# Patient Record
Sex: FEMALE | Race: WHITE | Employment: OTHER | URBAN - NONMETROPOLITAN AREA
[De-identification: names, ages, dates, MRNs, and addresses within clinical notes are randomized per-mention and may not be internally consistent; named-entity substitution may affect disease eponyms.]

---

## 2018-04-29 ENCOUNTER — HOSPITAL ENCOUNTER (OUTPATIENT)
Dept: LAB | Age: 83
Discharge: HOME | End: 2018-04-29
Attending: CLINIC/CENTER | Admitting: CLINIC/CENTER

## 2018-04-29 DIAGNOSIS — J20.9 ACUTE BRONCHITIS, UNSPECIFIED ORGANISM: ICD-10-CM

## 2018-04-29 DIAGNOSIS — R06.02 BREATH SHORTNESS: ICD-10-CM

## 2018-04-29 LAB
ALBUMIN SERPL-MCNC: 3.8 GM/DL (ref 3.4–5)
ALP BLD-CCNC: 98 IU/L (ref 40–129)
ALT SERPL-CCNC: 11 U/L (ref 10–40)
ANION GAP SERPL CALCULATED.3IONS-SCNC: 11 MMOL/L (ref 4–16)
AST SERPL-CCNC: 23 IU/L (ref 15–37)
BASOPHILS ABSOLUTE: 0.1 K/CU MM
BASOPHILS RELATIVE PERCENT: 0.7 % (ref 0–1)
BILIRUB SERPL-MCNC: 0.4 MG/DL (ref 0–1)
BUN BLDV-MCNC: 12 MG/DL (ref 6–23)
CALCIUM SERPL-MCNC: 8.9 MG/DL (ref 8.3–10.6)
CHLORIDE BLD-SCNC: 98 MMOL/L (ref 99–110)
CO2: 27 MMOL/L (ref 21–32)
CREAT SERPL-MCNC: 1.3 MG/DL (ref 0.6–1.1)
DIFFERENTIAL TYPE: ABNORMAL
EOSINOPHILS ABSOLUTE: 0.3 K/CU MM
EOSINOPHILS RELATIVE PERCENT: 3.3 % (ref 0–3)
GFR AFRICAN AMERICAN: 47 ML/MIN/1.73M2
GFR NON-AFRICAN AMERICAN: 39 ML/MIN/1.73M2
GLUCOSE BLD-MCNC: 153 MG/DL (ref 70–99)
HCT VFR BLD CALC: 45.5 % (ref 37–47)
HEMOGLOBIN: 14.8 GM/DL (ref 12.5–16)
IMMATURE NEUTROPHIL %: 0.5 % (ref 0–0.43)
LYMPHOCYTES ABSOLUTE: 2.3 K/CU MM
LYMPHOCYTES RELATIVE PERCENT: 28.4 % (ref 24–44)
MCH RBC QN AUTO: 30 PG (ref 27–31)
MCHC RBC AUTO-ENTMCNC: 32.5 % (ref 32–36)
MCV RBC AUTO: 92.1 FL (ref 78–100)
MONOCYTES ABSOLUTE: 0.8 K/CU MM
MONOCYTES RELATIVE PERCENT: 9.4 % (ref 0–4)
PDW BLD-RTO: 13.2 % (ref 11.7–14.9)
PLATELET # BLD: 211 K/CU MM (ref 140–440)
PMV BLD AUTO: 9.1 FL (ref 7.5–11.1)
POTASSIUM SERPL-SCNC: 4 MMOL/L (ref 3.5–5.1)
RBC # BLD: 4.94 M/CU MM (ref 4.2–5.4)
SEGMENTED NEUTROPHILS ABSOLUTE COUNT: 4.7 K/CU MM
SEGMENTED NEUTROPHILS RELATIVE PERCENT: 57.7 % (ref 36–66)
SODIUM BLD-SCNC: 136 MMOL/L (ref 135–145)
TOTAL IMMATURE NEUTOROPHIL: 0.04 K/CU MM
TOTAL PROTEIN: 7.1 GM/DL (ref 6.4–8.2)
WBC # BLD: 8.1 K/CU MM (ref 4–10.5)

## 2022-01-12 ENCOUNTER — HOSPITAL ENCOUNTER (EMERGENCY)
Age: 87
Discharge: HOME | End: 2022-01-12
Attending: EMERGENCY MEDICINE
Payer: MEDICARE

## 2022-01-12 ENCOUNTER — APPOINTMENT (OUTPATIENT)
Dept: CT IMAGING | Age: 87
End: 2022-01-12
Payer: MEDICARE

## 2022-01-12 ENCOUNTER — APPOINTMENT (OUTPATIENT)
Dept: GENERAL RADIOLOGY | Age: 87
End: 2022-01-12
Payer: MEDICARE

## 2022-01-12 VITALS
OXYGEN SATURATION: 100 % | DIASTOLIC BLOOD PRESSURE: 90 MMHG | TEMPERATURE: 99.7 F | RESPIRATION RATE: 22 BRPM | BODY MASS INDEX: 21.81 KG/M2 | HEART RATE: 92 BPM | SYSTOLIC BLOOD PRESSURE: 164 MMHG | WEIGHT: 108 LBS

## 2022-01-12 DIAGNOSIS — N83.8 OVARIAN MASS, RIGHT: ICD-10-CM

## 2022-01-12 DIAGNOSIS — R41.82 ALTERED MENTAL STATUS, UNSPECIFIED ALTERED MENTAL STATUS TYPE: Primary | ICD-10-CM

## 2022-01-12 LAB
ALBUMIN SERPL-MCNC: 3.9 GM/DL (ref 3.4–5)
ALP BLD-CCNC: 75 IU/L (ref 40–129)
ALT SERPL-CCNC: 10 U/L (ref 10–40)
ANION GAP SERPL CALCULATED.3IONS-SCNC: 12 MMOL/L (ref 4–16)
AST SERPL-CCNC: 29 IU/L (ref 15–37)
BACTERIA: ABNORMAL /HPF
BASOPHILS ABSOLUTE: 0 K/CU MM
BASOPHILS RELATIVE PERCENT: 0.4 % (ref 0–1)
BILIRUB SERPL-MCNC: 0.5 MG/DL (ref 0–1)
BILIRUBIN DIRECT: 0.2 MG/DL (ref 0–0.3)
BILIRUBIN URINE: NEGATIVE MG/DL
BILIRUBIN, INDIRECT: 0.3 MG/DL (ref 0–0.7)
BLOOD, URINE: ABNORMAL
BUN BLDV-MCNC: 19 MG/DL (ref 6–23)
CALCIUM SERPL-MCNC: 8.8 MG/DL (ref 8.3–10.6)
CAST TYPE: ABNORMAL /HPF
CHLORIDE BLD-SCNC: 105 MMOL/L (ref 99–110)
CLARITY: CLEAR
CO2: 22 MMOL/L (ref 21–32)
COLOR: YELLOW
CREAT SERPL-MCNC: 1.1 MG/DL (ref 0.6–1.1)
CRYSTAL TYPE: ABNORMAL /HPF
DIFFERENTIAL TYPE: ABNORMAL
EOSINOPHILS ABSOLUTE: 0 K/CU MM
EOSINOPHILS RELATIVE PERCENT: 0.1 % (ref 0–3)
EPITHELIAL CELLS, UA: ABNORMAL /HPF
GFR AFRICAN AMERICAN: 56 ML/MIN/1.73M2
GFR NON-AFRICAN AMERICAN: 47 ML/MIN/1.73M2
GLUCOSE BLD-MCNC: 146 MG/DL (ref 70–99)
GLUCOSE BLD-MCNC: 148 MG/DL (ref 70–99)
GLUCOSE, URINE: NEGATIVE MG/DL
HCT VFR BLD CALC: 44 % (ref 37–47)
HEMOGLOBIN: 14.7 GM/DL (ref 12.5–16)
IMMATURE NEUTROPHIL %: 0.4 % (ref 0–0.43)
KETONES, URINE: ABNORMAL MG/DL
LEUKOCYTE ESTERASE, URINE: NEGATIVE
LIPASE: 24 IU/L (ref 13–60)
LYMPHOCYTES ABSOLUTE: 0.6 K/CU MM
LYMPHOCYTES RELATIVE PERCENT: 8.6 % (ref 24–44)
MCH RBC QN AUTO: 29.6 PG (ref 27–31)
MCHC RBC AUTO-ENTMCNC: 33.4 % (ref 32–36)
MCV RBC AUTO: 88.7 FL (ref 78–100)
MONOCYTES ABSOLUTE: 0.6 K/CU MM
MONOCYTES RELATIVE PERCENT: 8.1 % (ref 0–4)
MUCUS: ABNORMAL HPF
NITRITE URINE, QUANTITATIVE: NEGATIVE
PDW BLD-RTO: 14.3 % (ref 11.7–14.9)
PH, URINE: 5 (ref 5–8)
PLATELET # BLD: 144 K/CU MM (ref 140–440)
PMV BLD AUTO: 9.9 FL (ref 7.5–11.1)
POTASSIUM SERPL-SCNC: 4.3 MMOL/L (ref 3.5–5.1)
PRO-BNP: 1743 PG/ML
PROTEIN UA: NEGATIVE MG/DL
RBC # BLD: 4.96 M/CU MM (ref 4.2–5.4)
RBC URINE: ABNORMAL /HPF (ref 0–6)
SEGMENTED NEUTROPHILS ABSOLUTE COUNT: 5.6 K/CU MM
SEGMENTED NEUTROPHILS RELATIVE PERCENT: 82.4 % (ref 36–66)
SODIUM BLD-SCNC: 139 MMOL/L (ref 135–145)
SPECIFIC GRAVITY UA: 1.02 (ref 1–1.03)
TOTAL IMMATURE NEUTOROPHIL: 0.03 K/CU MM
TOTAL PROTEIN: 6.8 GM/DL (ref 6.4–8.2)
TROPONIN T: <0.01 NG/ML
UROBILINOGEN, URINE: 0.2 MG/DL (ref 0.2–1)
VOLUME, (UVOL): 12 ML (ref 10–12)
WBC # BLD: 6.8 K/CU MM (ref 4–10.5)
WBC UA: ABNORMAL /HPF (ref 0–5)

## 2022-01-12 PROCEDURE — 74177 CT ABD & PELVIS W/CONTRAST: CPT

## 2022-01-12 PROCEDURE — 81001 URINALYSIS AUTO W/SCOPE: CPT

## 2022-01-12 PROCEDURE — 85025 COMPLETE CBC W/AUTO DIFF WBC: CPT

## 2022-01-12 PROCEDURE — 83690 ASSAY OF LIPASE: CPT

## 2022-01-12 PROCEDURE — 99283 EMERGENCY DEPT VISIT LOW MDM: CPT

## 2022-01-12 PROCEDURE — 83880 ASSAY OF NATRIURETIC PEPTIDE: CPT

## 2022-01-12 PROCEDURE — 71045 X-RAY EXAM CHEST 1 VIEW: CPT

## 2022-01-12 PROCEDURE — 84484 ASSAY OF TROPONIN QUANT: CPT

## 2022-01-12 PROCEDURE — 80053 COMPREHEN METABOLIC PANEL: CPT

## 2022-01-12 PROCEDURE — 82248 BILIRUBIN DIRECT: CPT

## 2022-01-12 PROCEDURE — 93005 ELECTROCARDIOGRAM TRACING: CPT | Performed by: EMERGENCY MEDICINE

## 2022-01-12 PROCEDURE — 6360000004 HC RX CONTRAST MEDICATION: Performed by: EMERGENCY MEDICINE

## 2022-01-12 PROCEDURE — 70450 CT HEAD/BRAIN W/O DYE: CPT

## 2022-01-12 PROCEDURE — 82962 GLUCOSE BLOOD TEST: CPT

## 2022-01-12 RX ADMIN — IOPAMIDOL 100 ML: 755 INJECTION, SOLUTION INTRAVENOUS at 19:55

## 2022-01-12 NOTE — ED NOTES
Sai pt's niece states \" she got her booster yesterday and she has become more weak since then. She does have confusion from time to time. She was weaker this morning than she normally is\".       Rebeka Cagle RN  01/12/22 6615

## 2022-01-12 NOTE — ED PROVIDER NOTES
CHIEF COMPLAINT  Chief Complaint   Patient presents with    Altered Mental Status     pt family states confusion and weakness that started this morning. Pt got covid booster yesterday. HPI  Shi Bradshaw is a 80 y.o. female with history of arthritis, thyroid disease who presents confusion and altered mental status that started this morning. She received her COVID booster yesterday. Family reports that she seems more sleepy, less likely to respond and generalizability slower than usual.  Patient denies any pain and states \"I do not know why I am here\". She does have some discomfort on abdominal palpation. She denies any focal weakness or numbness or falls. She denies any cough, shortness of breath, fevers or vomiting. REVIEW OF SYSTEMS  Review of Systems   History obtained from chart review and the patient  General ROS: negative for - chills or fever  Ophthalmic ROS: negative for - decreased vision or double vision  ENT ROS: negative for - headaches  Hematological and Lymphatic ROS: negative for - bleeding problems  Endocrine ROS: negative for - unexpected weight changes  Respiratory ROS: no cough, shortness of breath, or wheezing  Cardiovascular ROS: no chest pain or dyspnea on exertion  Gastrointestinal ROS: no change in bowel habits, or black or bloody stools  Genito-Urinary ROS: no dysuria, trouble voiding, or hematuria  Musculoskeletal ROS: negative for - joint stiffness or joint swelling  Neurological ROS: no TIA or stroke symptoms      PAST MEDICAL HISTORY  Past Medical History:   Diagnosis Date    Arthritis     Thyroid disease        FAMILY HISTORY  No family history on file. SOCIAL HISTORY  Social History     Socioeconomic History    Marital status:       Spouse name: Not on file    Number of children: Not on file    Years of education: Not on file    Highest education level: Not on file   Occupational History    Not on file   Tobacco Use    Smoking status: Never Smoker  Smokeless tobacco: Never Used   Substance and Sexual Activity    Alcohol use: No    Drug use: No    Sexual activity: Never   Other Topics Concern    Not on file   Social History Narrative    Not on file     Social Determinants of Health     Financial Resource Strain:     Difficulty of Paying Living Expenses: Not on file   Food Insecurity:     Worried About Running Out of Food in the Last Year: Not on file    Meredith of Food in the Last Year: Not on file   Transportation Needs:     Lack of Transportation (Medical): Not on file    Lack of Transportation (Non-Medical): Not on file   Physical Activity:     Days of Exercise per Week: Not on file    Minutes of Exercise per Session: Not on file   Stress:     Feeling of Stress : Not on file   Social Connections:     Frequency of Communication with Friends and Family: Not on file    Frequency of Social Gatherings with Friends and Family: Not on file    Attends Sikhism Services: Not on file    Active Member of [address] [place] [address] or Organizations: Not on file    Attends Club or Organization Meetings: Not on file    Marital Status: Not on file   Intimate Partner Violence:     Fear of Current or Ex-Partner: Not on file    Emotionally Abused: Not on file    Physically Abused: Not on file    Sexually Abused: Not on file   Housing Stability:     Unable to Pay for Housing in the Last Year: Not on file    Number of [place] in the Last Year: Not on file    Unstable Housing in the Last Year: Not on file       SURGICAL HISTORY  Past Surgical History:   Procedure Laterality Date    APPENDECTOMY      CHOLECYSTECTOMY      [facility] [place]  No current facility-administered medications on file prior to encounter.      Current Outpatient Medications on File Prior to Encounter   Medication Sig Dispense Refill    levothyroxine (SYNTHROID) 125 MCG tablet Take 125 mcg by mouth Daily  omeprazole (PRILOSEC) 20 MG capsule Take 20 mg by mouth daily           ALLERGIES  No Known Allergies    PHYSICAL EXAM  VITAL SIGNS: BP (!) 182/101   Pulse 105   Temp 99.7 °F (37.6 °C) (Oral)   Resp 22   Wt 108 lb (49 kg)   SpO2 95%   BMI 21.81 kg/m²   Constitutional: Well developed, Well nourished, resting in bed, elderly  HENT: Normocephalic, Atraumatic, Bilateral external ears normal, Oropharynx moist, No oral exudates, Nose normal.   Eyes: PERRL, EOMI, Conjunctiva normal, No discharge. Neck: Normal range of motion, Supple, No stridor. Cardiovascular: Normal heart rate, Normal rhythm, No murmurs, No rubs, No gallops. Thorax & Lungs: Normal breath sounds, No respiratory distress, No wheezing, No chest tenderness. Abdomen: Abdominal distention, mild diffuse tenderness, remittent diffuse guarding   skin: Warm, Dry, No erythema, No rash. Extremities: Intact distal pulses, No edema, No tenderness, No cyanosis, No clubbing. Musculoskeletal: Good gross range of motion in all major joints. No major deformities noted. Neurologic: Alert & oriented x 3, Normal gross motor function, Normal gross sensory function, No focal deficits noted.    Psychiatric: Affect normal    EKG  EKG Interpretation    Interpreted by emergency department physician from January 12 at [facility]    Rhythm: normal sinus   Rate: normal  Axis: left  Ectopy: none  Conduction: nonspecific interventricular conduction block  ST Segments: nonspecific changes  T Waves: no acute change  Q Waves: none    Clinical Impression: Sinus rhythm with a rate of 100, sinus arrhythmia, left bundle branch block with nonspecific ST changes    Villa Peabody, MD      RADIOLOGY/PROCEDURES/LABS  Last Imaging results   XR CHEST PORTABLE    (Results Pending)   CT ABDOMEN PELVIS W IV CONTRAST Additional Contrast? None    (Results Pending)   CT HEAD WO CONTRAST    (Results Pending)       Imaging pending at time of signout    Labs Reviewed   CBC WITH AUTO DIFFERENTIAL - Abnormal; Notable for the following components:       Result Value    Segs Relative 82.4 (*)     Lymphocytes % 8.6 (*)     Monocytes % 8.1 (*)     All other components within normal limits   BASIC METABOLIC PANEL W/ REFLEX TO MG FOR LOW K - Abnormal; Notable for the following components:    Glucose 148 (*)     GFR Non- 47 (*)     GFR  56 (*)     All other components within normal limits   BRAIN NATRIURETIC PEPTIDE - Abnormal; Notable for the following components:    Pro-BNP 1,743 (*)     All other components within normal limits   POCT GLUCOSE - Abnormal; Notable for the following components:    POC Glucose 146 (*)     All other components within normal limits   HEPATIC FUNCTION PANEL   LIPASE   TROPONIN   URINALYSIS         Medications - No data to display    COURSE & MEDICAL DECISION MAKING  Pertinent Labs & Imaging studies reviewed. (See chart for details)    59-year-old female presents with altered mental status after COVID booster. She is awaiting imaging, laboratory studies at time of signout. Based on her abdominal distention we did obtain a CT scan of her abdomen, CT of her head also pending. Signed out to oncoming physician to facilitate disposition. Patient neurologically nonfocal, not suggestive of CVA, otherwise active and able to answer questions but does appear hard of hearing. FINAL IMPRESSION  Problem List Items Addressed This Visit     None      Visit Diagnoses     Altered mental status, unspecified altered mental status type    -  Primary      1.    2.   3.    Patient gave me permission to discuss medical history, care, and plan with those present in the room.   Electronically signed by: Sandi Her MD, 1/12/2022  MD Sandi Orellana MD  01/12/22 [address] [facility]

## 2022-01-13 LAB
EKG ATRIAL RATE: 100 BPM
EKG DIAGNOSIS: NORMAL
EKG P AXIS: 69 DEGREES
EKG P-R INTERVAL: 232 MS
EKG Q-T INTERVAL: 338 MS
EKG QRS DURATION: 102 MS
EKG QTC CALCULATION (BAZETT): 436 MS
EKG R AXIS: -53 DEGREES
EKG T AXIS: 70 DEGREES
EKG VENTRICULAR RATE: 100 BPM

## 2022-01-13 PROCEDURE — 93010 ELECTROCARDIOGRAM REPORT: CPT | Performed by: INTERNAL MEDICINE

## 2022-01-13 NOTE — ED PROVIDER NOTES
ADDENDUM:    Care of the patient was assumed  from Dr. Maria De Jesus Donaldson. I have reviewed the notes, assessments, and/or procedures performed, I concur with her/his documentation on Ines Martinez. I reviewed the medical record and evaluated the patient. ED COURSE/MDM:  Laboratory and imaging data were reviewed and care plan was arranged with the patient(see separate lab/imaging reports). RADIOLOGY:  Already resulted studies have been reviewed. CT HEAD WO CONTRAST   Preliminary Result   No acute intracranial abnormality. Cerebral atrophy. Severe chronic small vessel ischemic changes. CT ABDOMEN PELVIS W IV CONTRAST Additional Contrast? 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 No ascites. XR CHEST PORTABLE   Preliminary Result   Fullness of the hilar regions bilaterally. Underlying lymphadenopathy cannot   be entirely excluded. PA and lateral views of the chest or CT of the chest   is suggested to further evaluate.              Labs Reviewed   CBC WITH AUTO DIFFERENTIAL - Abnormal; Notable for the following components:       Result Value    Segs Relative 82.4 (*)     Lymphocytes % 8.6 (*)     Monocytes % 8.1 (*)     All other components within normal limits   BASIC METABOLIC PANEL W/ REFLEX TO MG FOR LOW K - Abnormal; Notable for the following components:    Glucose 148 (*)     GFR Non- 47 (*)     GFR  56 (*)     All other components within normal limits   BRAIN NATRIURETIC PEPTIDE - Abnormal; Notable for the following components:    Pro-BNP 1,743 (*)     All other components within normal limits   URINALYSIS - Abnormal; Notable for the following components:    Clarity, UA CLEAR (*)     Ketones, Urine TRACE (*)     Blood, Urine 1+ (*)     Bacteria, UA NONE (*)     Mucus, UA 2+ (*)     All other components within normal limits   POCT GLUCOSE - Abnormal; Notable for the following components:    POC Glucose 146 (*)     All other components within normal limits   HEPATIC FUNCTION PANEL   LIPASE   TROPONIN       Medications   iopamidol (ISOVUE-370) 76 % injection 100 mL (100 mLs IntraVENous Given 1/12/22 1955)       Vitals:    01/12/22 1801 01/12/22 1815 01/12/22 2100 01/12/22 2115   BP:  (!) 182/101  (!) 164/90   Pulse: 105   92   Resp: 22      Temp: 99.7 °F (37.6 °C)      TempSrc: Oral      SpO2: 95%  100%    Weight: 108 lb (49 kg)          CT HEAD WO CONTRAST   Preliminary Result   No acute intracranial abnormality. Cerebral atrophy. Severe chronic small vessel ischemic changes. CT ABDOMEN PELVIS W IV CONTRAST Additional Contrast? 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 No ascites. XR CHEST PORTABLE   Preliminary Result   Fullness of the hilar regions bilaterally. Underlying lymphadenopathy cannot   be entirely excluded. PA and lateral views of the chest or CT of the chest   is suggested to further evaluate. Patient has an Ovarian mass with cystic quality. This was originally seen in 2015 when it was 2cm. There was no follow up and no referral that I can find and patient did not know about this mass. I have referred the patient to Dr. Pavel Temple who is on call for gynecology. She will be following up with the patient inn her office. I would like to thank Dr. Pavel Temple for being so helpful in my disposition of this patient. My typical dicussion, presentation, and considerations for this patients' chief complaint, diagnosis, differential diagnosis, medications, medication use,  medication safety and medication interactions have been explained and outlined to this patient for this patient encounter. I have stressed need for follow up and reexamination for this encounter     FINAL IMPRESSION:  1. Altered mental status, unspecified altered mental status type    2.  Ovarian mass, right        Discharge Medication List as of 1/12/2022  9:21 PM                    Liberty Schreiber Issa Cano DO  01/12/22 2228

## 2022-03-01 ENCOUNTER — APPOINTMENT (OUTPATIENT)
Dept: MRI IMAGING | Age: 87
DRG: 194 | End: 2022-03-01
Payer: MEDICARE

## 2022-03-01 ENCOUNTER — APPOINTMENT (OUTPATIENT)
Dept: CT IMAGING | Age: 87
DRG: 194 | End: 2022-03-01
Payer: MEDICARE

## 2022-03-01 ENCOUNTER — HOSPITAL ENCOUNTER (INPATIENT)
Age: 87
LOS: 3 days | Discharge: NURSING, REHAB OR LONG-TERM CARE | DRG: 194 | End: 2022-03-04
Attending: STUDENT IN AN ORGANIZED HEALTH CARE EDUCATION/TRAINING PROGRAM | Admitting: INTERNAL MEDICINE
Payer: MEDICARE

## 2022-03-01 ENCOUNTER — APPOINTMENT (OUTPATIENT)
Dept: GENERAL RADIOLOGY | Age: 87
DRG: 194 | End: 2022-03-01
Payer: MEDICARE

## 2022-03-01 DIAGNOSIS — R41.82 ALTERED MENTAL STATUS, UNSPECIFIED ALTERED MENTAL STATUS TYPE: Primary | ICD-10-CM

## 2022-03-01 DIAGNOSIS — J06.9 ACUTE UPPER RESPIRATORY INFECTION: ICD-10-CM

## 2022-03-01 LAB
ALBUMIN SERPL-MCNC: 4.3 GM/DL (ref 3.4–5)
ALP BLD-CCNC: 90 IU/L (ref 40–129)
ALT SERPL-CCNC: 14 U/L (ref 10–40)
AMMONIA: 14 UMOL/L (ref 11–51)
AMPHETAMINES: NEGATIVE
ANION GAP SERPL CALCULATED.3IONS-SCNC: 11 MMOL/L (ref 4–16)
AST SERPL-CCNC: 27 IU/L (ref 15–37)
BACTERIA: NEGATIVE /HPF
BARBITURATE SCREEN URINE: NEGATIVE
BASOPHILS ABSOLUTE: 0 K/CU MM
BASOPHILS RELATIVE PERCENT: 0.5 % (ref 0–1)
BENZODIAZEPINE SCREEN, URINE: NEGATIVE
BILIRUB SERPL-MCNC: 0.7 MG/DL (ref 0–1)
BILIRUBIN URINE: NEGATIVE MG/DL
BLOOD, URINE: NEGATIVE
BUN BLDV-MCNC: 18 MG/DL (ref 6–23)
CALCIUM SERPL-MCNC: 9.1 MG/DL (ref 8.3–10.6)
CANNABINOID SCREEN URINE: NEGATIVE
CAST TYPE: ABNORMAL /HPF
CHLORIDE BLD-SCNC: 103 MMOL/L (ref 99–110)
CLARITY: CLEAR
CO2: 25 MMOL/L (ref 21–32)
COCAINE METABOLITE: NEGATIVE
COLOR: YELLOW
CREAT SERPL-MCNC: 1.2 MG/DL (ref 0.6–1.1)
CRYSTAL TYPE: NEGATIVE /HPF
DIFFERENTIAL TYPE: ABNORMAL
EOSINOPHILS ABSOLUTE: 0.1 K/CU MM
EOSINOPHILS RELATIVE PERCENT: 1.7 % (ref 0–3)
EPITHELIAL CELLS, UA: NEGATIVE /HPF
GFR AFRICAN AMERICAN: 51 ML/MIN/1.73M2
GFR NON-AFRICAN AMERICAN: 42 ML/MIN/1.73M2
GLUCOSE BLD-MCNC: 131 MG/DL (ref 70–99)
GLUCOSE, URINE: NEGATIVE MG/DL
HCT VFR BLD CALC: 46.2 % (ref 37–47)
HEMOGLOBIN: 15.3 GM/DL (ref 12.5–16)
IMMATURE NEUTROPHIL %: 0.2 % (ref 0–0.43)
KETONES, URINE: 15 MG/DL
LACTIC ACID, SEPSIS: 1.8 MMOL/L (ref 0.5–1.9)
LEUKOCYTE ESTERASE, URINE: NEGATIVE
LIPASE: 27 IU/L (ref 13–60)
LYMPHOCYTES ABSOLUTE: 0.6 K/CU MM
LYMPHOCYTES RELATIVE PERCENT: 7 % (ref 24–44)
MCH RBC QN AUTO: 30 PG (ref 27–31)
MCHC RBC AUTO-ENTMCNC: 33.1 % (ref 32–36)
MCV RBC AUTO: 90.6 FL (ref 78–100)
MONOCYTES ABSOLUTE: 0.9 K/CU MM
MONOCYTES RELATIVE PERCENT: 11 % (ref 0–4)
NITRITE URINE, QUANTITATIVE: NEGATIVE
OPIATES, URINE: NEGATIVE
OXYCODONE: NEGATIVE
PDW BLD-RTO: 14.2 % (ref 11.7–14.9)
PH, URINE: 7 (ref 5–8)
PHENCYCLIDINE, URINE: NEGATIVE
PLATELET # BLD: 171 K/CU MM (ref 140–440)
PMV BLD AUTO: 10.1 FL (ref 7.5–11.1)
POTASSIUM SERPL-SCNC: 4.4 MMOL/L (ref 3.5–5.1)
PRO-BNP: 5429 PG/ML
PROTEIN UA: NEGATIVE MG/DL
RBC # BLD: 5.1 M/CU MM (ref 4.2–5.4)
RBC URINE: ABNORMAL /HPF (ref 0–6)
SARS-COV-2, NAAT: NOT DETECTED
SEGMENTED NEUTROPHILS ABSOLUTE COUNT: 6.7 K/CU MM
SEGMENTED NEUTROPHILS RELATIVE PERCENT: 79.6 % (ref 36–66)
SODIUM BLD-SCNC: 139 MMOL/L (ref 135–145)
SOURCE: NORMAL
SPECIFIC GRAVITY UA: 1.02 (ref 1–1.03)
T4 FREE: 1.24 NG/DL (ref 0.9–1.8)
TOTAL IMMATURE NEUTOROPHIL: 0.02 K/CU MM
TOTAL PROTEIN: 6.9 GM/DL (ref 6.4–8.2)
TROPONIN T: <0.01 NG/ML
TSH HIGH SENSITIVITY: 4.73 UIU/ML (ref 0.27–4.2)
UROBILINOGEN, URINE: 0.2 MG/DL (ref 0.2–1)
WBC # BLD: 8.4 K/CU MM (ref 4–10.5)
WBC UA: ABNORMAL /HPF (ref 0–5)

## 2022-03-01 PROCEDURE — 93005 ELECTROCARDIOGRAM TRACING: CPT | Performed by: STUDENT IN AN ORGANIZED HEALTH CARE EDUCATION/TRAINING PROGRAM

## 2022-03-01 PROCEDURE — 71045 X-RAY EXAM CHEST 1 VIEW: CPT

## 2022-03-01 PROCEDURE — 2580000003 HC RX 258: Performed by: STUDENT IN AN ORGANIZED HEALTH CARE EDUCATION/TRAINING PROGRAM

## 2022-03-01 PROCEDURE — 85025 COMPLETE CBC W/AUTO DIFF WBC: CPT

## 2022-03-01 PROCEDURE — 6360000002 HC RX W HCPCS: Performed by: NURSE PRACTITIONER

## 2022-03-01 PROCEDURE — 87086 URINE CULTURE/COLONY COUNT: CPT

## 2022-03-01 PROCEDURE — 87635 SARS-COV-2 COVID-19 AMP PRB: CPT

## 2022-03-01 PROCEDURE — 83880 ASSAY OF NATRIURETIC PEPTIDE: CPT

## 2022-03-01 PROCEDURE — 0202U NFCT DS 22 TRGT SARS-COV-2: CPT

## 2022-03-01 PROCEDURE — 6360000002 HC RX W HCPCS: Performed by: GENERAL ACUTE CARE HOSPITAL

## 2022-03-01 PROCEDURE — 84439 ASSAY OF FREE THYROXINE: CPT

## 2022-03-01 PROCEDURE — 84484 ASSAY OF TROPONIN QUANT: CPT

## 2022-03-01 PROCEDURE — 99285 EMERGENCY DEPT VISIT HI MDM: CPT

## 2022-03-01 PROCEDURE — 82140 ASSAY OF AMMONIA: CPT

## 2022-03-01 PROCEDURE — 83690 ASSAY OF LIPASE: CPT

## 2022-03-01 PROCEDURE — 83605 ASSAY OF LACTIC ACID: CPT

## 2022-03-01 PROCEDURE — 6370000000 HC RX 637 (ALT 250 FOR IP): Performed by: NURSE PRACTITIONER

## 2022-03-01 PROCEDURE — 80053 COMPREHEN METABOLIC PANEL: CPT

## 2022-03-01 PROCEDURE — 1200000000 HC SEMI PRIVATE

## 2022-03-01 PROCEDURE — 87040 BLOOD CULTURE FOR BACTERIA: CPT

## 2022-03-01 PROCEDURE — 81001 URINALYSIS AUTO W/SCOPE: CPT

## 2022-03-01 PROCEDURE — 2580000003 HC RX 258: Performed by: NURSE PRACTITIONER

## 2022-03-01 PROCEDURE — 70450 CT HEAD/BRAIN W/O DYE: CPT

## 2022-03-01 PROCEDURE — 84443 ASSAY THYROID STIM HORMONE: CPT

## 2022-03-01 PROCEDURE — 80307 DRUG TEST PRSMV CHEM ANLYZR: CPT

## 2022-03-01 RX ORDER — SODIUM CHLORIDE 9 MG/ML
INJECTION, SOLUTION INTRAVENOUS CONTINUOUS
Status: DISCONTINUED | OUTPATIENT
Start: 2022-03-01 | End: 2022-03-04

## 2022-03-01 RX ORDER — 0.9 % SODIUM CHLORIDE 0.9 %
500 INTRAVENOUS SOLUTION INTRAVENOUS ONCE
Status: DISCONTINUED | OUTPATIENT
Start: 2022-03-01 | End: 2022-03-01

## 2022-03-01 RX ORDER — LEVOTHYROXINE SODIUM 0.05 MG/1
50 TABLET ORAL DAILY
Status: ON HOLD | COMMUNITY
End: 2022-03-08 | Stop reason: HOSPADM

## 2022-03-01 RX ORDER — PANTOPRAZOLE SODIUM 40 MG/1
40 TABLET, DELAYED RELEASE ORAL
Status: DISCONTINUED | OUTPATIENT
Start: 2022-03-01 | End: 2022-03-04 | Stop reason: HOSPADM

## 2022-03-01 RX ORDER — OMEPRAZOLE 20 MG/1
20 CAPSULE, DELAYED RELEASE ORAL DAILY
Status: DISCONTINUED | OUTPATIENT
Start: 2022-03-01 | End: 2022-03-01 | Stop reason: CLARIF

## 2022-03-01 RX ORDER — LEVOTHYROXINE SODIUM 0.07 MG/1
75 TABLET ORAL DAILY
Status: DISCONTINUED | OUTPATIENT
Start: 2022-03-02 | End: 2022-03-04 | Stop reason: HOSPADM

## 2022-03-01 RX ORDER — LEVOTHYROXINE SODIUM 0.12 MG/1
125 TABLET ORAL DAILY
Status: DISCONTINUED | OUTPATIENT
Start: 2022-03-01 | End: 2022-03-01

## 2022-03-01 RX ORDER — METOPROLOL SUCCINATE 25 MG/1
25 TABLET, EXTENDED RELEASE ORAL DAILY
Status: DISCONTINUED | OUTPATIENT
Start: 2022-03-01 | End: 2022-03-02

## 2022-03-01 RX ORDER — ACETAMINOPHEN 325 MG/1
650 TABLET ORAL EVERY 6 HOURS PRN
Status: DISCONTINUED | OUTPATIENT
Start: 2022-03-01 | End: 2022-03-04 | Stop reason: HOSPADM

## 2022-03-01 RX ORDER — POLYETHYLENE GLYCOL 3350 17 G/17G
17 POWDER, FOR SOLUTION ORAL DAILY PRN
Status: DISCONTINUED | OUTPATIENT
Start: 2022-03-01 | End: 2022-03-04 | Stop reason: HOSPADM

## 2022-03-01 RX ORDER — ONDANSETRON 4 MG/1
4 TABLET, ORALLY DISINTEGRATING ORAL EVERY 8 HOURS PRN
Status: DISCONTINUED | OUTPATIENT
Start: 2022-03-01 | End: 2022-03-04 | Stop reason: HOSPADM

## 2022-03-01 RX ORDER — ONDANSETRON 2 MG/ML
4 INJECTION INTRAMUSCULAR; INTRAVENOUS EVERY 6 HOURS PRN
Status: DISCONTINUED | OUTPATIENT
Start: 2022-03-01 | End: 2022-03-04 | Stop reason: HOSPADM

## 2022-03-01 RX ORDER — DIPHENHYDRAMINE HYDROCHLORIDE 50 MG/ML
12.5 INJECTION INTRAMUSCULAR; INTRAVENOUS EVERY 6 HOURS PRN
Status: DISCONTINUED | OUTPATIENT
Start: 2022-03-01 | End: 2022-03-02

## 2022-03-01 RX ORDER — ACETAMINOPHEN 650 MG/1
650 SUPPOSITORY RECTAL EVERY 6 HOURS PRN
Status: DISCONTINUED | OUTPATIENT
Start: 2022-03-01 | End: 2022-03-04 | Stop reason: HOSPADM

## 2022-03-01 RX ORDER — METOPROLOL SUCCINATE 25 MG/1
12.5 TABLET, EXTENDED RELEASE ORAL DAILY
COMMUNITY

## 2022-03-01 RX ADMIN — SODIUM CHLORIDE: 9 INJECTION, SOLUTION INTRAVENOUS at 16:50

## 2022-03-01 RX ADMIN — DIPHENHYDRAMINE HYDROCHLORIDE 12.5 MG: 50 INJECTION, SOLUTION INTRAMUSCULAR; INTRAVENOUS at 20:58

## 2022-03-01 RX ADMIN — METOPROLOL SUCCINATE 25 MG: 25 TABLET, EXTENDED RELEASE ORAL at 17:56

## 2022-03-01 RX ADMIN — SODIUM CHLORIDE 500 ML: 9 INJECTION, SOLUTION INTRAVENOUS at 15:33

## 2022-03-01 RX ADMIN — ENOXAPARIN SODIUM 40 MG: 100 INJECTION SUBCUTANEOUS at 17:56

## 2022-03-01 ASSESSMENT — PAIN SCALES - WONG BAKER
WONGBAKER_NUMERICALRESPONSE: 0

## 2022-03-01 NOTE — ED PROVIDER NOTES
Emergency Department Encounter    Patient: Olivia Welch  MRN: [id number]  : 3/26/1931  Date of Evaluation: 3/1/2022  ED Provider:  Michele Victor MD    Triage Chief Complaint:   Altered Mental Status (in by squad from home- family checked on her this am and pt confused- difficulty w/ speech during triage )    [place]:  Olivia Welch is a 80 y.o. female with history seen below presenting with altered mental status, generalized fatigue and cough. Daughter states that patient began having cough with mild sputum production yesterday. No fevers or significant shortness of breath. Denies chest pain. States this morning she noticed patient was more altered with generalized weakness. Denies any focality to the weakness. Denies any recent falls. States sometimes she will act like this when she has a urinary tract infection. Per family patient does not have diagnosis of dementia but states she does become more confused at night at baseline and is only alert and oriented to person and place at baseline    ROS - see HPI, below listed is current ROS at time of my eval:  Unable to obtain history from patient. Review of systems is limited. Review of system is obtained is obtained by daughter at bedside and can be seen in the HPI. Past Medical History:   Diagnosis Date    Arthritis     Thyroid disease      Past Surgical History:   Procedure Laterality Date    APPENDECTOMY      CHOLECYSTECTOMY      EYE SURGERY      FRACTURE SURGERY      HYSTERECTOMY      TONSILLECTOMY       History reviewed. No pertinent family history. Social History     Socioeconomic History    Marital status:       Spouse name: Not on file    Number of children: Not on file    Years of education: Not on file    Highest education level: Not on file   Occupational History    Not on file   Tobacco Use    Smoking status: Never Smoker    Smokeless tobacco: Never Used   Substance and Sexual Activity    Alcohol use: No    Drug use: No    Sexual activity: Never   Other Topics Concern    Not on file   Social History Narrative    Not on file     Social Determinants of Health     Financial Resource Strain:     Difficulty of Paying Living Expenses: Not on file   Food Insecurity:     Worried About Running Out of Food in the Last Year: Not on file    Meredith of Food in the Last Year: Not on file   Transportation Needs:     Lack of Transportation (Medical): Not on file    Lack of Transportation (Non-Medical): Not on file   Physical Activity:     Days of Exercise per Week: Not on file    Minutes of Exercise per Session: Not on file   Stress:     Feeling of Stress : Not on file   Social Connections:     Frequency of Communication with Friends and Family: Not on file    Frequency of Social Gatherings with Friends and Family: Not on file    Attends Confucianist Services: Not on file    Active Member of [address] [place] [address] [employer] or Organizations: Not on file    Attends Club or Organization Meetings: Not on file    Marital Status: Not on file   Intimate Partner Violence:     Fear of Current or Ex-Partner: Not on file    Emotionally Abused: Not on file    Physically Abused: Not on file    Sexually Abused: Not on file   Housing Stability:     Unable to Pay for Housing in the Last Year: Not on file    Number of [place] in the Last Year: Not on file    Unstable Housing in the Last Year: Not on file     No current facility-administered medications for this encounter.      Current Outpatient Medications   Medication Sig Dispense Refill    omeprazole (PRILOSEC) 20 MG capsule Take 20 mg by mouth daily      levothyroxine (SYNTHROID) 125 MCG tablet Take 125 mcg by mouth Daily       No Known Allergies    Nursing Notes Reviewed    Physical Exam:  Triage VS:    ED Triage Vitals   Enc Vitals Group      BP 03/01/22 1322 (!) 149/71      Pulse 03/01/22 1322 83      Resp 03/01/22 1322 20      Temp 03/01/22 1325 99 °F (37.2 °C)      Temp Source 03/01/22 1325 Oral      SpO2 03/01/22 1322 95 %      Weight 03/01/22 1322 135 lb 14.4 oz (61.6 kg)      Height --       Head Circumference --       Peak Flow --       Pain Score --       Pain Loc --       Pain Edu? --       Excl. in [address]? --        My pulse ox interpretation is - normal    General appearance:  No acute distress. Skin:  Warm. Dry. Eye:  Extraocular movements intact. Ears, nose, mouth and throat:  Oral mucosa moist   Neck:  Trachea midline. Extremity:  No swelling. Normal ROM     Heart:  Regular rate and rhythm, normal S1 & S2, no extra heart sounds. Perfusion:  intact  Respiratory:  Lungs clear to auscultation bilaterally. Respirations nonlabored. Abdominal:  Normal bowel sounds. Soft. Nontender. Non distended. Back:  No CVA tenderness to palpation     Neurological:  Alert and oriented to person only. No focal neuro deficits. No facial asymmetry, extraocular eye movements are intact, pupils are 3 mm reactive bilaterally, no pronator drift of the bilateral upper or lower extremities, difficult to assess sensation but patient states there is no change in sensation over the face or sensation changes over the bilateral upper or lower extremities. Does not cooperate with finger-nose-finger or heel shin but this appears more consistent with her altered mental status and confusion.           Psychiatric:  Appropriate    I have reviewed and interpreted all of the currently available lab results from this visit (if applicable):  Results for orders placed or performed during the hospital encounter of 03/01/22   EKG 12 Lead   Result Value Ref Range    Ventricular Rate 81 BPM    Atrial Rate 81 BPM    P-R Interval 124 ms    QRS Duration 88 ms    Q-T Interval 364 ms    QTc Calculation (Bazett) 422 ms    P Axis 51 degrees    R Axis -57 degrees    T Axis 82 degrees    Diagnosis       Normal sinus rhythm with sinus arrhythmia  Left axis deviation  Minimal voltage criteria for LVH, may be normal variant  Inferior infarct (cited on or before 12-JAN-2022)  Anteroseptal infarct (cited on or before 12-JAN-2022)  Abnormal ECG  When compared with ECG of 12-JAN-2022 18:13,  premature supraventricular complexes are no longer present  ME interval has decreased  ST more depressed in Lateral leads        Radiographs (if obtained):  Radiologist's Report Reviewed:  No results found. EKG (if obtained): (All EKG's are interpreted by myself in the absence of a cardiologist)  Normal sinus rhythm, left axis deviation, ventricular rate 81, ME interval 124, QRS duration 88, QTc 422, no significant ST elevation or depression    MDM:    70-year-old female presenting with generalized weakness, cough and altered mental status. History and be seen above. Vitals on presentation reassuring and patient afebrile satting 95% on room air. On exam patient has no focal deficits. Patient is alert and oriented to person only and baseline patient is alert to person and place and conversational.  CBC is reassuring without leukocytosis. CMP is reassuring. Azalee Chyle is within normal limits. Lipase and UA are reassuring. TSH is mildly elevated but  free T4 is within normal limits. Lactate is within normal limits. Rapid Covid is negative. Drug screen is negative. CT head is nonacute. Chest x-ray is nonacute. Patient likely has viral infection with worsening dementia causing acute encephalopathy. Hospitalist was called and patient was made at their service for further evaluation and treatment. Clinical Impression:  1. Altered mental status, unspecified altered mental status type    2. Acute upper respiratory infection          Comment: Please note this report has been produced using speech recognition software and may contain errors related to that system including errors in grammar, punctuation, and spelling, as well as words and phrases that may be inappropriate. Efforts were made to edit the dictations.         ArAkredo [facility] Tito Mcdonald MD  03/02/22 5583

## 2022-03-01 NOTE — H&P
was completed in the emergency department and was negative. Her niece states she had a cough yesterday with some mild sputum production. Her niece also tells me that she is mildly confused at home she was seen in the emergency room in January 2022 for confusion as well. No fevers no shortness of breath room air saturations 98%. Denies any chest pain no abdominal pain no nausea no vomiting no diarrhea. Her daughter states that she noticed this morning patient was more altered with generalized weakness. Denies any focal weaknesses. Denies any recent falls. Patient does live with her niece. Her niece states that sometimes she will act like this when she has UTI however urinalysis does not show sign of infection but culture was ordered. Patient will be admitted for further evaluation and treatment. Review of systems is unobtainable at this time given patient's mental status information is gathered from patient's daughter and from chart    Objective:   No intake or output data in the 24 hours ending 03/01/22 1603   Vitals:   Vitals:    03/01/22 1531   BP: (!) 157/78   Pulse: 92   Resp: (!) 31   Temp:    SpO2: 93%     Physical Exam:   GEN Awake female, sitting upright in bed in no apparent distress. Appears given age. EYES Pupils are equally round. No scleral erythema, discharge, or conjunctivitis. HENT Mucous membranes are moist. Oral pharynx without exudates, no evidence of thrush. NECK Supple, no apparent thyromegaly or masses. RESP Clear to auscultation, no wheezes, rales or rhonchi. Symmetric chest movement while on room air. CARDIO/VASC S1/S2 auscultated. Regular rate without appreciable murmurs, rubs, or gallops. No JVD or carotid bruits. Peripheral pulses equal bilaterally and palpable. No peripheral edema. GI Abdomen is soft without significant tenderness, noted mass known cystic neoplasm no  guarding. Bowel sounds are normoactive. Rectal exam deferred.     No costovertebral angle tenderness. MSK No gross joint deformities. SKIN Normal coloration, warm, dry. NEURO Cranial nerves appear grossly intact, normal speech, no lateralizing weakness. PSYCH Awake, alert, oriented x 1. Past Medical History:      Past Medical History:   Diagnosis Date    Arthritis     Thyroid disease      PSHX:  has a past surgical history that includes Tonsillectomy; Appendectomy; Cholecystectomy; Hysterectomy; eye surgery; and fracture surgery. Allergies: No Known Allergies    FAM HX: Patient is unaware of any family history, she never had any children, and is unsure of her parents family medical history. Soc HX:   Social History     Socioeconomic History    Marital status:      Spouse name: None    Number of children: None    Years of education: None    Highest education level: None   Occupational History    None   Tobacco Use    Smoking status: Never Smoker    Smokeless tobacco: Never Used   Substance and Sexual Activity    Alcohol use: No    Drug use: No    Sexual activity: Never   Other Topics Concern    None   Social History Narrative    None     Social Determinants of Health     Financial Resource Strain:     Difficulty of Paying Living Expenses: Not on file   Food Insecurity:     Worried About Running Out of Food in the Last Year: Not on file    Meredith of Food in the Last Year: Not on file   Transportation Needs:     Lack of Transportation (Medical): Not on file    Lack of Transportation (Non-Medical):  Not on file   Physical Activity:     Days of Exercise per Week: Not on file    Minutes of Exercise per Session: Not on file   Stress:     Feeling of Stress : Not on file   Social Connections:     Frequency of Communication with Friends and Family: Not on file    Frequency of Social Gatherings with Friends and Family: Not on file    Attends Baptism Services: Not on file    Active Member of Clubs or Organizations: Not on file    Attends Club or Organization Meetings: Not on file    Marital Status: Not on file   Intimate Partner Violence:     Fear of Current or Ex-Partner: Not on file    Emotionally Abused: Not on file    Physically Abused: Not on file    Sexually Abused: Not on file   Housing Stability:     Unable to Pay for Housing in the Last Year: Not on file    Number of [place] in the Last Year: Not on file    Unstable Housing in the Last Year: Not on file       Medications:   Medications:    sodium chloride  500 mL IntraVENous Once    enoxaparin  40 mg SubCUTAneous Daily      Infusions:    sodium chloride       PRN Meds: ondansetron, 4 mg, Q8H PRN   Or  ondansetron, 4 mg, Q6H PRN  polyethylene glycol, 17 g, Daily PRN  acetaminophen, 650 mg, Q6H PRN   Or  acetaminophen, 650 mg, Q6H PRN          Electronically signed by ALEJANDRO Ray NP on 3/1/2022 at 4:03 PM

## 2022-03-02 ENCOUNTER — APPOINTMENT (OUTPATIENT)
Dept: MRI IMAGING | Age: 87
DRG: 194 | End: 2022-03-02
Payer: MEDICARE

## 2022-03-02 LAB
ADENOVIRUS DETECTION BY PCR: NOT DETECTED
ANION GAP SERPL CALCULATED.3IONS-SCNC: 14 MMOL/L (ref 4–16)
BACTERIA: NEGATIVE /HPF
BASOPHILS ABSOLUTE: 0 K/CU MM
BASOPHILS RELATIVE PERCENT: 0.3 % (ref 0–1)
BILIRUBIN URINE: NEGATIVE MG/DL
BLOOD, URINE: ABNORMAL
BORDETELLA PARAPERTUSSIS BY PCR: NOT DETECTED
BORDETELLA PERTUSSIS PCR: NOT DETECTED
BUN BLDV-MCNC: 19 MG/DL (ref 6–23)
CALCIUM SERPL-MCNC: 7.9 MG/DL (ref 8.3–10.6)
CAST TYPE: ABNORMAL /HPF
CHLAMYDOPHILA PNEUMONIA PCR: NOT DETECTED
CHLORIDE BLD-SCNC: 106 MMOL/L (ref 99–110)
CLARITY: CLEAR
CO2: 21 MMOL/L (ref 21–32)
COLOR: YELLOW
CORONAVIRUS 229E PCR: ABNORMAL
CORONAVIRUS HKU1 PCR: NOT DETECTED
CORONAVIRUS NL63 PCR: NOT DETECTED
CORONAVIRUS OC43 PCR: NOT DETECTED
CREAT SERPL-MCNC: 1.2 MG/DL (ref 0.6–1.1)
CRYSTAL TYPE: NEGATIVE /HPF
CULTURE: NORMAL
DIFFERENTIAL TYPE: ABNORMAL
EKG ATRIAL RATE: 81 BPM
EKG DIAGNOSIS: NORMAL
EKG P AXIS: 51 DEGREES
EKG P-R INTERVAL: 124 MS
EKG Q-T INTERVAL: 364 MS
EKG QRS DURATION: 88 MS
EKG QTC CALCULATION (BAZETT): 422 MS
EKG R AXIS: -57 DEGREES
EKG T AXIS: 82 DEGREES
EKG VENTRICULAR RATE: 81 BPM
EOSINOPHILS ABSOLUTE: 0 K/CU MM
EOSINOPHILS RELATIVE PERCENT: 0 % (ref 0–3)
EPITHELIAL CELLS, UA: NEGATIVE /HPF
GFR AFRICAN AMERICAN: 51 ML/MIN/1.73M2
GFR NON-AFRICAN AMERICAN: 42 ML/MIN/1.73M2
GLUCOSE BLD-MCNC: 84 MG/DL (ref 70–99)
GLUCOSE, URINE: NEGATIVE MG/DL
HCT VFR BLD CALC: 43.1 % (ref 37–47)
HEMOGLOBIN: 13.6 GM/DL (ref 12.5–16)
HUMAN METAPNEUMOVIRUS PCR: NOT DETECTED
IMMATURE NEUTROPHIL %: 0.5 % (ref 0–0.43)
INFLUENZA A BY PCR: ABNORMAL
INFLUENZA A H1 (2009) PCR: NOT DETECTED
INFLUENZA A H1 PANDEMIC PCR: NOT DETECTED
INFLUENZA A H3 PCR: ABNORMAL
INFLUENZA B BY PCR: NOT DETECTED
KETONES, URINE: ABNORMAL MG/DL
LEUKOCYTE ESTERASE, URINE: NEGATIVE
LYMPHOCYTES ABSOLUTE: 1 K/CU MM
LYMPHOCYTES RELATIVE PERCENT: 15.7 % (ref 24–44)
Lab: NORMAL
MCH RBC QN AUTO: 29.2 PG (ref 27–31)
MCHC RBC AUTO-ENTMCNC: 31.6 % (ref 32–36)
MCV RBC AUTO: 92.5 FL (ref 78–100)
MONOCYTES ABSOLUTE: 1.1 K/CU MM
MONOCYTES RELATIVE PERCENT: 16.2 % (ref 0–4)
MYCOPLASMA PNEUMONIAE PCR: NOT DETECTED
NITRITE URINE, QUANTITATIVE: NEGATIVE
PARAINFLUENZA 1 PCR: NOT DETECTED
PARAINFLUENZA 2 PCR: NOT DETECTED
PARAINFLUENZA 3 PCR: NOT DETECTED
PARAINFLUENZA 4 PCR: NOT DETECTED
PDW BLD-RTO: 14.6 % (ref 11.7–14.9)
PH, URINE: 5.5 (ref 5–8)
PLATELET # BLD: 138 K/CU MM (ref 140–440)
PMV BLD AUTO: 10.4 FL (ref 7.5–11.1)
POTASSIUM SERPL-SCNC: 3.9 MMOL/L (ref 3.5–5.1)
PROTEIN UA: ABNORMAL MG/DL
RBC # BLD: 4.66 M/CU MM (ref 4.2–5.4)
RBC URINE: ABNORMAL /HPF (ref 0–6)
RHINOVIRUS ENTEROVIRUS PCR: NOT DETECTED
RSV PCR: NOT DETECTED
SARS-COV-2: NOT DETECTED
SEGMENTED NEUTROPHILS ABSOLUTE COUNT: 4.4 K/CU MM
SEGMENTED NEUTROPHILS RELATIVE PERCENT: 67.3 % (ref 36–66)
SODIUM BLD-SCNC: 141 MMOL/L (ref 135–145)
SPECIFIC GRAVITY UA: >1.03 (ref 1–1.03)
SPECIMEN: NORMAL
TOTAL IMMATURE NEUTOROPHIL: 0.03 K/CU MM
UROBILINOGEN, URINE: 0.2 MG/DL (ref 0.2–1)
WBC # BLD: 6.5 K/CU MM (ref 4–10.5)
WBC UA: ABNORMAL /HPF (ref 0–5)

## 2022-03-02 PROCEDURE — 97166 OT EVAL MOD COMPLEX 45 MIN: CPT

## 2022-03-02 PROCEDURE — 93010 ELECTROCARDIOGRAM REPORT: CPT | Performed by: INTERNAL MEDICINE

## 2022-03-02 PROCEDURE — 36415 COLL VENOUS BLD VENIPUNCTURE: CPT

## 2022-03-02 PROCEDURE — 51702 INSERT TEMP BLADDER CATH: CPT

## 2022-03-02 PROCEDURE — 70551 MRI BRAIN STEM W/O DYE: CPT

## 2022-03-02 PROCEDURE — 94761 N-INVAS EAR/PLS OXIMETRY MLT: CPT

## 2022-03-02 PROCEDURE — 81001 URINALYSIS AUTO W/SCOPE: CPT

## 2022-03-02 PROCEDURE — 1200000000 HC SEMI PRIVATE

## 2022-03-02 PROCEDURE — 51798 US URINE CAPACITY MEASURE: CPT

## 2022-03-02 PROCEDURE — 2580000003 HC RX 258: Performed by: NURSE PRACTITIONER

## 2022-03-02 PROCEDURE — 97162 PT EVAL MOD COMPLEX 30 MIN: CPT

## 2022-03-02 PROCEDURE — 80048 BASIC METABOLIC PNL TOTAL CA: CPT

## 2022-03-02 PROCEDURE — 85025 COMPLETE CBC W/AUTO DIFF WBC: CPT

## 2022-03-02 PROCEDURE — 6370000000 HC RX 637 (ALT 250 FOR IP): Performed by: NURSE PRACTITIONER

## 2022-03-02 PROCEDURE — 6360000002 HC RX W HCPCS: Performed by: NURSE PRACTITIONER

## 2022-03-02 PROCEDURE — 94640 AIRWAY INHALATION TREATMENT: CPT

## 2022-03-02 PROCEDURE — 87086 URINE CULTURE/COLONY COUNT: CPT

## 2022-03-02 RX ORDER — METOPROLOL SUCCINATE 25 MG/1
12.5 TABLET, EXTENDED RELEASE ORAL DAILY
Status: DISCONTINUED | OUTPATIENT
Start: 2022-03-03 | End: 2022-03-04 | Stop reason: HOSPADM

## 2022-03-02 RX ORDER — OSELTAMIVIR PHOSPHATE 75 MG/1
75 CAPSULE ORAL 2 TIMES DAILY
Status: DISCONTINUED | OUTPATIENT
Start: 2022-03-02 | End: 2022-03-02 | Stop reason: DRUGHIGH

## 2022-03-02 RX ORDER — IPRATROPIUM BROMIDE AND ALBUTEROL SULFATE 2.5; .5 MG/3ML; MG/3ML
1 SOLUTION RESPIRATORY (INHALATION)
Status: DISCONTINUED | OUTPATIENT
Start: 2022-03-02 | End: 2022-03-04 | Stop reason: HOSPADM

## 2022-03-02 RX ORDER — OSELTAMIVIR PHOSPHATE 30 MG/1
30 CAPSULE ORAL DAILY
Status: DISCONTINUED | OUTPATIENT
Start: 2022-03-02 | End: 2022-03-04 | Stop reason: HOSPADM

## 2022-03-02 RX ADMIN — METOPROLOL SUCCINATE 25 MG: 25 TABLET, EXTENDED RELEASE ORAL at 07:56

## 2022-03-02 RX ADMIN — IPRATROPIUM BROMIDE AND ALBUTEROL SULFATE 1 AMPULE: 2.5; .5 SOLUTION RESPIRATORY (INHALATION) at 19:25

## 2022-03-02 RX ADMIN — ENOXAPARIN SODIUM 30 MG: 100 INJECTION SUBCUTANEOUS at 07:56

## 2022-03-02 RX ADMIN — PANTOPRAZOLE SODIUM 40 MG: 40 TABLET, DELAYED RELEASE ORAL at 05:51

## 2022-03-02 RX ADMIN — OSELTAMIVIR 30 MG: 30 CAPSULE ORAL at 20:03

## 2022-03-02 RX ADMIN — LEVOTHYROXINE SODIUM 75 MCG: 0.07 TABLET ORAL at 05:52

## 2022-03-02 RX ADMIN — SODIUM CHLORIDE: 9 INJECTION, SOLUTION INTRAVENOUS at 18:27

## 2022-03-02 RX ADMIN — SODIUM CHLORIDE: 9 INJECTION, SOLUTION INTRAVENOUS at 05:22

## 2022-03-02 ASSESSMENT — PAIN SCALES - GENERAL
PAINLEVEL_OUTOF10: 0
PAINLEVEL_OUTOF10: 0

## 2022-03-02 NOTE — PROGRESS NOTES
Hospitalist Progress Note      Name:  Mable Ambriz /Age/Sex: 3/26/1931  (80 y.o. female)   MRN & CSN:  [id number] & [id number] Admission Date/Time: 3/1/2022  1:19 PM   Location:   PCP: Jared De La Cruz DO         Hospital Day: 2    Assessment and Plan:   Mable Ambriz is a 80 y.o.  female  who presents with AMS (altered mental status)    1. Alteration in mental status, urinalysis completed and negative CT head completed and negative. MRI shows NO acute processes . This may be possible worsening of dementia. 2. Urinary retention,melendez catheter placed  3. Dementia, not on any medications, discussed with niece who lives with patient and is her caregiver and she does state that she has some mild dementia. 4. Hypothyroidism, TSH is elevated may need to increase Synthroid will discuss with family before increasing medication. Synthroid will be increased to 75 MCG's daily patient will follow up with her PCP when discharged from hospital  5. Hypertension, will continue metoprolol 25 mg daily  6. Arthritis, continue current home medications  7. Dehydration, will gently hydrate  8. Acute kidney injury creatinine 1.2 most likely secondary to dehydration gently hydrate monitor  9. GERD, Protonix  10. Complains of cough and congestion, will check respiratory viral panel  11. 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 Diet ADULT DIET; Regular   DVT Prophylaxis [] Lovenox, []  Heparin, [] SCDs, [] Ambulation   GI Prophylaxis [] PPI,  [] H2 Blocker,  [] Carafate,  [] Diet/Tube Feeds   Code Status DNR-CCA             History of Present Illness:     Chief Complaint    Patient seen and examined today, she is up with therapy walking around the bed to sit in the chair. She tells me she has no complaints no chest pain no shortness of breath no nausea no vomiting no diarrhea.   Patient did have some difficulty with urinary retention she was only able to void 25 cc on night bladder scan was completed Morrison catheter has now been placed. Continue to monitor continue to work with therapy. Ten point ROS reviewed negative, unless as noted above    Objective: Intake/Output Summary (Last 24 hours) at 3/2/2022 1251  Last data filed at 3/2/2022 0934  Gross per 24 hour   Intake 403.33 ml   Output 1099 ml   Net -695.67 ml      Vitals:   Vitals:    03/02/22 0735   BP:    Pulse:    Resp:    Temp:    SpO2: 93%     Physical Exam:   GEN Awake female, sitting upright in bed in no apparent distress. Appears given age. EYES Pupils are equally round. No scleral erythema, discharge, or conjunctivitis. HENT Mucous membranes are moist. Oral pharynx without exudates, no evidence of thrush. NECK Supple, no apparent thyromegaly or masses. RESP diminished no wheezes, rales or rhonchi. Symmetric chest movement while on room air. CARDIO/VASC S1/S2 auscultated. Regular rate without appreciable murmurs, rubs, or gallops. No JVD or carotid bruits. Peripheral pulses equal bilaterally and palpable. No peripheral edema. GI Abdomen is soft without significant tenderness, masses, or guarding. Bowel sounds are normoactive. Rectal exam deferred.  No costovertebral angle tenderness. . Morrison catheter is present. HEME/LYMPH No palpable cervical lymphadenopathy and no hepatosplenomegaly. No petechiae or ecchymoses. MSK No gross joint deformities. SKIN Normal coloration, warm, dry. NEURO Cranial nerves appear grossly intact, normal speech, no lateralizing weakness. PSYCH Awake, alert, oriented x 2. Affect appropriate.     Medications:   Medications:    enoxaparin  30 mg SubCUTAneous Daily    [START ON 3/3/2022] metoprolol succinate  12.5 mg Oral Daily    levothyroxine  75 mcg Oral Daily    pantoprazole  40 mg Oral QAM AC      Infusions:    sodium chloride 75 mL/hr at 03/02/22 0522     PRN Meds: ondansetron, 4 mg, Q8H PRN   Or  ondansetron, 4 mg, Q6H PRN  polyethylene glycol, 17 g, Daily PRN  acetaminophen, 650 mg, Q6H PRN   Or  acetaminophen, 650 mg, Q6H PRN              Electronically signed by ALEJANDRO Liz NP on 3/2/2022 at 12:51 PM

## 2022-03-02 NOTE — CARE COORDINATION
Macie Chapa was evaluated today and a DME order was entered for a gladys wheeled walker because she requires this to successfully complete daily living tasks of eating, bathing, toileting, personal cares, ambulating, grooming, hygiene, dressing upper body, dressing lower body, meal preparation and taking own medications. A gladys wheeled walker is necessary due to the patient's unsteady gait, upper body weakness, and inability to  an ambulation device; and she can ambulate only by pushing a gladys walker instead of a lesser assistive device such as a cane, crutch, or standard walker. The need for this equipment was discussed with the patient and she understands and is in agreement.

## 2022-03-02 NOTE — PROGRESS NOTES
Patient very confused. Going to let patient sleep for the night and attempt MRI in the morning 03/02. May need medication to hold still for scan.  Spoke with RN and Charge Nurse

## 2022-03-02 NOTE — PROGRESS NOTES
Physical Therapy    Facility/Department: [facility] UNIT  Initial Assessment    NAME: Sonya Chong  : 3/26/1931  MRN: [id number]    Date of Service: 3/2/2022    Discharge Recommendations:  24 hour supervision or assist,Subacute/Skilled Nursing Facility (swing bed)        Assessment   Body structures, Functions, Activity limitations: Decreased functional mobility ; Decreased endurance;Decreased strength;Decreased high-level IADLs  Assessment: Patient is a 80 female admitted to [address] [place] [address]- patient's current function is below her usual baseline due to recent illness and patient would benefit from PT while in [facility]- patient could be a candidate for swing bed if debility from illness  does not resolve  Treatment Diagnosis: impaired mobility/debility  Prognosis: Good  Decision Making: Medium Complexity  History: PF - age  Exam: ROM/ strength/bed mobility/transfers/ gait  Clinical Presentation: evolving  Barriers to Learning: hearing  REQUIRES PT FOLLOW UP: Yes       Patient Diagnosis(es): There were no encounter diagnoses. has a past medical history of Arthritis and Thyroid disease. has a past surgical history that includes Tonsillectomy; Appendectomy; Cholecystectomy; Hysterectomy; eye surgery; and fracture surgery. Restrictions  Restrictions/Precautions  Restrictions/Precautions: Fall Risk  Vision/Hearing  Vision Exceptions: Wears glasses at all times  Hearing Exceptions: Hard of hearing/hearing concerns; No hearing aid     Subjective  General  Chart Reviewed: Yes  Patient assessed for rehabilitation services?: Yes  Follows Commands: Within Functional Limits  Pain Screening  Patient Currently in Pain: Denies     Pre Treatment Pain Screening  Pain at present: 0    Orientation     Social/Functional History  Social/Functional History  Lives With: Family  Type of Home: House  Home Layout: Performs ADL's on one level  Home Access: Stairs to enter without rails  Entrance Stairs - Number of Steps: 3  Home Equipment: Rolling walker  Receives Help From: Family  ADL Assistance: Needs assistance  Homemaking Assistance: Needs assistance  Homemaking Responsibilities: No  Ambulation Assistance: Needs assistance (uses RW @ times)  Transfer Assistance: Independent  Active : No  Mode of Transportation: Family  Occupation: Retired  Additional Comments: All social functional information obtained from nurses report. Patient with increased confusion and unable to answer and no family present. Nurse spoke with niece on admission to obtain information  Cognition        Objective     Observation/Palpation  Observation: in bed, IV and catheter present    AROM RLE (degrees)  RLE AROM: WFL  PROM LLE (degrees)  LLE General PROM: L ankle ROM limited 2/2 old ankle fracture  Strength RLE  Comment: difficulty with SLR- strength grossly 3-/5 to 3+/5  Strength LLE  Comment: I SLR  strength grossly  3+/5        Bed mobility  Supine to Sit: Minimal assistance; Moderate assistance  Transfers  Sit to Stand: Contact guard assistance  Stand to sit: Contact guard assistance  Ambulation  Ambulation?: Yes  Ambulation 1  Surface: level tile  Device: Rolling Walker  Assistance: Minimal assistance;Contact guard assistance  Gait Deviations: Slow Elsa              Plan   Plan  Times per week: Mon- Sat 5x/wk  Current Treatment Recommendations: Strengthening,Transfer Training,Endurance Training,Gait Training,Functional Mobility Training  Safety Devices  Type of devices: Gait belt,Chair alarm in place,Call light within reach,Left in chair    G-Code       OutComes Score                                                  AM-PAC Score           Basic Mobility Six Clicks Form [facility] AM-PAC Score Conversion Table   How much difficulty does the patient currently have Unable   (pt is unable to do activity) A Lot   (activity is a struggle, requires great effort/time) A Little   (pt can manage, but takes more effort/time than should) None   (pt has no difficulty) Raw Score Standardized Score CMS -100% Score CMS Modifier        6 23.55 100% CN   Turning over in bed (including adjusting bedclothes, sheets, and blankets)? []1 []2  [x]3  []4  7 26.42 92.36% CM        8 28.58 86.62% CM   Sitting down on and standing up from a chair with arms (e.g. wheelchair, bedside commode, etc.)? []1 []2 [x]3   []4   9 30.55 81.38% CM        10 32.29 76.75% CL   Moving from lying on back to sitting on the side of the bed? []1 []2  [x]3   []4   11 33.86 72.57% CL        12 35.33 68.66% CL   How much help from another person does the patient currently need Total   (Total/Dependent Assist) A Lot   (Max/Mod Assist) A Little   (Min/CGA/Supervision) None   (No human assistance) 13 36.74 64.91% CL        14 38.1 61.29% CL   Moving to and from a bed to a chair (including a wheelchair)? []1  []2   [x]3  []4   15 39.45 57.70% CK        16 40.78 54.16% CK   To walk in a hospital room? []1 []2   [x]3    []4  17 42.13 50.57% CK        18 43.63 46.58% CK   Climbing 3-5 steps with a railing?  []1  [x]2   []3    []4  19 45.44 41.77% CK        20 47.67 35.83% CJ   Raw Score 17 21 50.25 28.97% CJ   Standardized Score   22 53.28 20.91% CJ   CMS 0-100% Score   23 56.93 11.20% CI   CMS Modifier  24 61.14 0.00% CH     CH = 0% impaired  CI = 1-20% impaired  CJ = 20-40% impaired  CK = 40-60% impaired  CL = 60-80% impaired  CM = % impaired  CN = 100% impaired       Goals  Short term goals  Short term goal 1: 1 week  Short term goal 2: patient will complete all bed mobility activity with mod I  Short term goal 3: patient will safely perform all transfers with SBA  Short term goal 4: patient will ambulate 150 ft using RW and CGA  Short term goal 5: patient will safely ascend/descend  a 6'\" step in // bars       Therapy Time   Individual Concurrent Group Co-treatment   Time In [place] 30         Time Out 1015         Minutes 25                 J Diego Nogueira, PT

## 2022-03-02 NOTE — PROGRESS NOTES
[facility],[address]   RENAL DOSE ADJUSTMENT MADE PER P/T PROTOCOL    PREVIOUS ORDER:  Tamiflu 75mg PO BID for 5 days    Estimated Creatinine Clearance: 22 mL/min (A) (based on SCr of 1.2 mg/dL (H)). Recent Labs     03/01/22  1330 03/01/22  1330 03/02/22  0600   BUN 18  --  19   CREATININE 1.2*   < > 1.2*      < > 138*    < > = values in this interval not displayed.      NEW RENALLY ADJUSTED ORDER:  Tamiflu 30mg PO daily for 5 days for CrCl 10-30mL/min    Jeff Ortiz [facility]  3/2/2022 6:54 PM

## 2022-03-02 NOTE — CARE COORDINATION
CM attempted to call the patient's niece Neema Collazo to discuss discharge planning but there was no answer. CM left a voicemail requesting a return call. CM will follow. 11:40 AM  CM met with the patient and her niece Mortimer Mourning at the bedside for dischage planning, patient was sleeping in the recliner. Vendal advised that she lives with the patient in the patient's home. Patient has a 1 story home (3 steps to enter), insurance with Rx coverage & PCP, and requires minor assistance with ADL's. Mortimer Mourning stated that the patient is ambulatory at home and sometimes uses a walker but does not always because she is stubborn and does not like to use it. [place] stated that the walker the patient uses at home was given to her and she feels the patient would benefit from a smaller sized walker due to her short height. Patient does not require home oxygen. Vendal advised that the patient \"would never agree to going to a nursing home\" and Mortimer Mourning also believes that the patient's confusion would worsen in a nursing facility. Mortimer Mourning stated that she feels that the patient would benefit from a referral to [place] [address] and stated that she has no preference regarding agency selected. CM will follow. 1:21 PM  CM faxed the DME order for a gladys sized wheeled walker to [address] to initiate the referral.  CM will follow.

## 2022-03-02 NOTE — PROGRESS NOTES
Occupational Therapy   Occupational Therapy Initial Assessment  Date: 3/2/2022   Patient Name: Nathaly Poole  MRN: [id number]     : 3/26/1931    Date of Service: 3/2/2022    Discharge Recommendations:  Continue to assess pending progress,24 hour supervision or assist (swingbed)       Assessment   Performance deficits / Impairments: Decreased functional mobility ; Decreased ADL status; Decreased cognition;Decreased endurance  Assessment: Patient is a 80 y.o female who presented to the hospital for altered mental status. Patient presents with general weakness. Patient will be seen by skilled OT to improve independence with ADLs, functional transfers, and activity tolerance for general strengthening  Treatment Diagnosis: generalized weakness  Prognosis: Good  Decision Making: Medium Complexity  OT Education: OT Role  REQUIRES OT FOLLOW UP: Yes  Activity Tolerance  Activity Tolerance: Patient Tolerated treatment well;Treatment limited secondary to decreased cognition  Safety Devices  Safety Devices in place: Yes  Type of devices: Gait belt;Left in bed;Nurse notified (patient left with nurse)           Patient Diagnosis(es): There were no encounter diagnoses. has a past medical history of Arthritis and Thyroid disease. has a past surgical history that includes Tonsillectomy; Appendectomy; Cholecystectomy; Hysterectomy; eye surgery; and fracture surgery. Treatment Diagnosis: generalized weakness      Restrictions  Restrictions/Precautions  Restrictions/Precautions: Fall Risk    Subjective   General  Chart Reviewed: Yes  Family / Caregiver Present: No  Subjective  Subjective: Patient supine in bed, HOB elevated upon arrival.  Patient Currently in Pain: Denies  Vital Signs  Patient Currently in Pain: Denies     Social/Functional History  Social/Functional History  Lives With: Family (niece)  Home Access:  (1-2 ELIZ.  Unsure if there are handrails)  Home Equipment: Rolling walker (Per nurse, patients niece reports patient does not like to use FWW and will ambulate around the house without it)  Receives Help From: Family  ADL Assistance: Needs assistance  Homemaking Assistance: Needs assistance  Homemaking Responsibilities: No  Ambulation Assistance: Needs assistance  Additional Comments: All social functional information obtained from nurses report. Patient with increased confusion and unable to answer and no family present. Nurse spoke with niece on admission to obtain information       Objective   Vision: Impaired  Vision Exceptions: Wears glasses at all times  Hearing: Exceptions to [facility]  Hearing Exceptions: Hard of hearing/hearing concerns; No hearing aid    ADL  Additional Comments: unable to assess. Patient with increased confusion and difficulty hearing. Patient able to read questions when written down with increased time  Bed mobility  Supine to Sit: Minimal assistance (for BLE placement)  Sit to Supine: Minimal assistance (BLE placement)  Scooting: Stand by assistance;Maximal assistance;2 Person assistance (Patient scooted in wheelchair with flat surface SBA. Patient required max A x2 to scoot in bed.)  Transfers  Stand Pivot Transfers: Minimal assistance  Sit to stand: Minimal assistance  Stand to sit: Minimal assistance  Cognition  Overall Cognitive Status: Exceptions  Arousal/Alertness: Delayed responses to stimuli  Following Commands:  Follows one step commands with increased time  Initiation: Requires cues for all  LUE AROM (degrees)  LUE AROM : WFL  Left Hand AROM (degrees)  Left Hand AROM: WFL  LUE Strength  LUE Strength Comment: unable to assess  RUE Strength  RUE Strength Comment: unable to assess    Plan   Plan  Times per week: 2+  Current Treatment Recommendations: Endurance Training,Self-Care / ADL,Functional Mobility Training,Safety Education & Training,Patient/Caregiver Education & Training    G-Code     OutComes Score                                                  AM-PAC Score  AM-PAC 6 click short form for inpatient daily activity:   How much help from another person does the patient currently need. .. Unable  Dep A Lot  Max A A Lot   Mod A A Little  Min A A Little   CGA  SBA None   Mod I  Indep  Sup   1. Putting on and taking off regular lower body clothing? [] 1    [x] 2   [] 2   [] 3   [] 3   [] 4      2. Bathing (including washing, rinsing, drying)? [] 1   [] 2   [x] 2 [] 3 [] 3 [] 4   3. Toileting, which includes using toilet, bedpan, or urinal? [] 1    [] 2   [] 2   [x] 3   [] 3   [] 4     4. Putting on and taking off regular upper body clothing? [] 1   [] 2   [] 2   [x] 3   [] 3    [] 4      5. Taking care of personal grooming such as brushing teeth? [] 1   [] 2    [] 2 [] 3    [x] 3   [] 4      6. Eating meals?    [] 1   [] 2   [] 2   [] 3   [] 3   [x] 4      Raw Score:  17     [24=0% impaired(CH), 23=1-19%(CI), 20-22=20-39%(CJ), 15-19=40-59%(CK), 10-14=60-79%(CL), 7-9=80-99%(CM), 6=100%(CN)]              Goals  Short term goals  Time Frame for Short term goals: until discharge  Short term goal 1: Patient will be MI for functional transfers to chair, toilet, or bed without LOB or falls using AE PRN  Short term goal 2: Patient will be sup for all toileting tasks  Short term goal 3: Patient will be MI for UB/LB dressing using AE PRN  Short term goal 4: Patient will demo 10 min of activity tolerance with ADLs MI       Therapy Time   Individual Concurrent Group Co-treatment   Time In 0849 0940       Time Out 0855 0950       Minutes 6 2300 [address] [id number]

## 2022-03-02 NOTE — FLOWSHEET NOTE
PT attempted to see pt for the eval, and woke her up. But Imaging came to take her down to get an MRI. PT A to transfer her to the cot via a sliding board.      Hao Montgomery PT DPT [id number]

## 2022-03-03 LAB
ANION GAP SERPL CALCULATED.3IONS-SCNC: 11 MMOL/L (ref 4–16)
BASOPHILS ABSOLUTE: 0 K/CU MM
BASOPHILS RELATIVE PERCENT: 0.4 % (ref 0–1)
BUN BLDV-MCNC: 21 MG/DL (ref 6–23)
CALCIUM SERPL-MCNC: 7.3 MG/DL (ref 8.3–10.6)
CHLORIDE BLD-SCNC: 110 MMOL/L (ref 99–110)
CO2: 20 MMOL/L (ref 21–32)
CREAT SERPL-MCNC: 1.2 MG/DL (ref 0.6–1.1)
CULTURE: NORMAL
DIFFERENTIAL TYPE: ABNORMAL
EOSINOPHILS ABSOLUTE: 0 K/CU MM
EOSINOPHILS RELATIVE PERCENT: 0.2 % (ref 0–3)
GFR AFRICAN AMERICAN: 51 ML/MIN/1.73M2
GFR NON-AFRICAN AMERICAN: 42 ML/MIN/1.73M2
GLUCOSE BLD-MCNC: 91 MG/DL (ref 70–99)
HCT VFR BLD CALC: 37 % (ref 37–47)
HEMOGLOBIN: 12.3 GM/DL (ref 12.5–16)
IMMATURE NEUTROPHIL %: 0.2 % (ref 0–0.43)
LYMPHOCYTES ABSOLUTE: 1.2 K/CU MM
LYMPHOCYTES RELATIVE PERCENT: 25.8 % (ref 24–44)
Lab: NORMAL
MCH RBC QN AUTO: 29.9 PG (ref 27–31)
MCHC RBC AUTO-ENTMCNC: 33.2 % (ref 32–36)
MCV RBC AUTO: 90 FL (ref 78–100)
MONOCYTES ABSOLUTE: 0.8 K/CU MM
MONOCYTES RELATIVE PERCENT: 17.5 % (ref 0–4)
PDW BLD-RTO: 14.4 % (ref 11.7–14.9)
PLATELET # BLD: 115 K/CU MM (ref 140–440)
PMV BLD AUTO: 10.3 FL (ref 7.5–11.1)
POTASSIUM SERPL-SCNC: 3.8 MMOL/L (ref 3.5–5.1)
RBC # BLD: 4.11 M/CU MM (ref 4.2–5.4)
SEGMENTED NEUTROPHILS ABSOLUTE COUNT: 2.7 K/CU MM
SEGMENTED NEUTROPHILS RELATIVE PERCENT: 55.9 % (ref 36–66)
SODIUM BLD-SCNC: 141 MMOL/L (ref 135–145)
SPECIMEN: NORMAL
TOTAL IMMATURE NEUTOROPHIL: 0.01 K/CU MM
WBC # BLD: 4.8 K/CU MM (ref 4–10.5)

## 2022-03-03 PROCEDURE — 94761 N-INVAS EAR/PLS OXIMETRY MLT: CPT

## 2022-03-03 PROCEDURE — 6370000000 HC RX 637 (ALT 250 FOR IP): Performed by: NURSE PRACTITIONER

## 2022-03-03 PROCEDURE — 85025 COMPLETE CBC W/AUTO DIFF WBC: CPT

## 2022-03-03 PROCEDURE — 2580000003 HC RX 258: Performed by: NURSE PRACTITIONER

## 2022-03-03 PROCEDURE — 94664 DEMO&/EVAL PT USE INHALER: CPT

## 2022-03-03 PROCEDURE — 94640 AIRWAY INHALATION TREATMENT: CPT

## 2022-03-03 PROCEDURE — 1200000000 HC SEMI PRIVATE

## 2022-03-03 PROCEDURE — 6360000002 HC RX W HCPCS: Performed by: NURSE PRACTITIONER

## 2022-03-03 PROCEDURE — 80048 BASIC METABOLIC PNL TOTAL CA: CPT

## 2022-03-03 RX ORDER — FUROSEMIDE 10 MG/ML
20 INJECTION INTRAMUSCULAR; INTRAVENOUS ONCE
Status: COMPLETED | OUTPATIENT
Start: 2022-03-03 | End: 2022-03-03

## 2022-03-03 RX ADMIN — METOPROLOL SUCCINATE 12.5 MG: 25 TABLET, EXTENDED RELEASE ORAL at 09:15

## 2022-03-03 RX ADMIN — IPRATROPIUM BROMIDE AND ALBUTEROL SULFATE 1 AMPULE: 2.5; .5 SOLUTION RESPIRATORY (INHALATION) at 19:34

## 2022-03-03 RX ADMIN — IPRATROPIUM BROMIDE AND ALBUTEROL SULFATE 1 AMPULE: 2.5; .5 SOLUTION RESPIRATORY (INHALATION) at 15:00

## 2022-03-03 RX ADMIN — FUROSEMIDE 20 MG: 10 INJECTION, SOLUTION INTRAMUSCULAR; INTRAVENOUS at 09:15

## 2022-03-03 RX ADMIN — SODIUM CHLORIDE: 9 INJECTION, SOLUTION INTRAVENOUS at 07:27

## 2022-03-03 RX ADMIN — IPRATROPIUM BROMIDE AND ALBUTEROL SULFATE 1 AMPULE: 2.5; .5 SOLUTION RESPIRATORY (INHALATION) at 11:30

## 2022-03-03 RX ADMIN — ENOXAPARIN SODIUM 30 MG: 100 INJECTION SUBCUTANEOUS at 09:15

## 2022-03-03 RX ADMIN — PANTOPRAZOLE SODIUM 40 MG: 40 TABLET, DELAYED RELEASE ORAL at 06:23

## 2022-03-03 RX ADMIN — OSELTAMIVIR 30 MG: 30 CAPSULE ORAL at 09:14

## 2022-03-03 RX ADMIN — LEVOTHYROXINE SODIUM 75 MCG: 0.07 TABLET ORAL at 06:23

## 2022-03-03 RX ADMIN — IPRATROPIUM BROMIDE AND ALBUTEROL SULFATE 1 AMPULE: 2.5; .5 SOLUTION RESPIRATORY (INHALATION) at 07:40

## 2022-03-03 ASSESSMENT — PAIN SCALES - GENERAL
PAINLEVEL_OUTOF10: 0

## 2022-03-03 NOTE — PROGRESS NOTES
At approximately 10am this RN entered patient room in response to bed alarm. Patient was noted to be climbing out of bed stating \"I have to poop\". This RN turned off bed alarm and assisted patient to toilet. Patient kept trying to walk unassisted and would not use walker or wait while this RN put on gait belt. Patient assisted to toilet and ARNOLD Zaldivar Nursing Supervisor brought in linens and helped make bed while patient on toilet. ARNOLD Zaldivar Nursing Supervisor left to answer another call light and then patient stated \"I need to get to bed, I am going to fall\". This RN assisted patient off toilet. Patient again refused walker and leaned over grabbing bed. This RN prompted patient to walk toward bed, patient stated \"I can't just let go and let me fall\". This RN informed patient that this RN did not want patient to fall and was not going to [employer] of her. Patient refused to walk forward, bent knees and was assisted to kneeling position at bedside by this RN. This RN requested assistance and MGlidden, PCT assisted this RN with getting patient into bed. Patient denies any pain stating \"I was just too tired to keep moving\". ARNOLD Zaldivar Nursing Supervisor and Aminata Rodríguez RN Nurse Manager informed of patient witnessed assist to kneeling position on floor. This RN spoke with [facility], NP face to face in department, no new orders at this time. This RN also spoke with patient's niece whom she lives with and niece agreeable at this time for patient to get therapy before returning home. Niece states Spike Aguilar was independent before and I cannot stop her if she falls, I would probably fall myself\".

## 2022-03-03 NOTE — CARE COORDINATION
CM notified by NP that she would like for the patient to be admitted to the swing bed program.  CM notified the swing bed coordinator of referral.  CM will follow.

## 2022-03-03 NOTE — PLAN OF CARE
Fall measures in place include: telesitter, bed alarm, bed in lowest position, wheels locked on bed, side rails up times two, and patient reoriented on using call light that is next to patient's hand on bed.    Problem: Falls - Risk of:  Goal: Absence of physical injury  Description: Absence of physical injury  Outcome: Ongoing

## 2022-03-03 NOTE — PROGRESS NOTES
Hospitalist Progress Note      Name:  Carline Fabry /Age/Sex: 3/26/1931  (80 y.o. female)   MRN & CSN:  [id number] & [id number] Admission Date/Time: 3/1/2022  1:19 PM   Location:   PCP: Al De La Cruz DO         Hospital Day: 3    Assessment and Plan:   Carline Fabry is a 80 y.o.  female  who presents with AMS (altered mental status)      1. Alteration in mental status, urinalysis completed and negative CT head completed and negative.  MRI shows NO acute processes .  Mentation is improving, however still not at baseline  2. Urinary retention,will remove catheter today and do voiding trial  3. Mild Dementia, not on any medications, discussed with niece who lives with patient and is her caregiver and she does state that she has some mild dementia. 4. Hypothyroidism  Synthroid will be increased to 75 MCG's daily patient will follow up with her PCP when discharged from hospital  5. Hypertension, will continue metoprolol 25 mg daily  6. Arthritis, continue current home medications  7. Dehydration, will gently hydrate  8. Acute kidney injury creatinine 1.2 most likely secondary to dehydration gently hydrate monitor  9. GERD, Protonix  10. Complains of cough and congestion, will check respiratory viral panel  11. 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 12. Influenza A, pt has been started on Tamiflu   13. Generalized weakness, pt will need PT/OT at discharge, she will go to Swing bed program if approved. Diet ADULT DIET;  Regular   DVT Prophylaxis [x] Lovenox, []  Heparin, [] SCDs, [] Ambulation   GI Prophylaxis [x] PPI,  [] H2 Blocker,  [] Carafate,  [] Diet/Tube Feeds   Code Status [id number]             History of Present Illness:     Chief Complaint: AMS (altered mental status)  Carline Fabry is a 80 y.o.  female  who presents with AMS  Patient seen and examined today, niece who is the caregiver was at bedside. Long discussion with patient and niece concerning discharge planning, patient is weak we have found that she has influenza A which is most likely causing this weakness, patient is a good candidate for swing bed program if she is approved. They are both in agreement with this. Continue current treatment for now. Ten point ROS reviewed negative, unless as noted above    Objective: Intake/Output Summary (Last 24 hours) at 3/3/2022 1309  Last data filed at 3/3/2022 1054  Gross per 24 hour   Intake 1983.37 ml   Output 1525 ml   Net 458.37 ml      Vitals:   Vitals:    03/03/22 0740   BP:    Pulse:    Resp:    Temp:    SpO2: 93%     Physical Exam:   GEN Awake female, sitting upright in bed in no apparent distress. Appears given age. VERY [place]  EYES Pupils are equally round. No scleral erythema, discharge, or conjunctivitis. HENT Mucous membranes are moist. Oral pharynx without exudates, no evidence of thrush. NECK Supple, no apparent thyromegaly or masses. RESP Clear to auscultation, no wheezes, rales or rhonchi. Symmetric chest movement while on room air. CARDIO/VASC S1/S2 auscultated. Regular ratePeripheral pulses equal bilaterally and palpable. No peripheral edema. GI Abdomen is soft without significant tenderness,+ mass, NO guarding. Bowel sounds are normoactive. Rectal exam deferred.  No costovertebral angle tenderness. MSK No gross joint deformities. SKIN Normal coloration, warm, dry. NEURO Cranial nerves appear grossly intact, normal speech, no lateralizing weakness. PSYCH Awake, alert, oriented x 2. Affect appropriate.     Medications:   Medications:    enoxaparin  30 mg SubCUTAneous Daily    metoprolol succinate  12.5 mg Oral Daily    ipratropium-albuterol  1 ampule Inhalation Q4H WA    oseltamivir  30 mg Oral Daily    levothyroxine  75 mcg Oral Daily    pantoprazole  40 mg Oral QAM AC      Infusions:    sodium chloride 75 mL/hr at 03/03/22 0727     PRN Meds: ondansetron, 4 mg, Q8H PRN   Or  ondansetron, 4 mg, Q6H PRN  polyethylene glycol, 17 g, Daily PRN  acetaminophen, 650 mg, Q6H PRN   Or  acetaminophen, 650 mg, Q6H PRN            Electronically signed by ALEJANDRO Skelton NP on 3/3/2022 at 1:09 PM

## 2022-03-04 ENCOUNTER — HOSPITAL ENCOUNTER (INPATIENT)
Age: 87
LOS: 4 days | Discharge: HOME | DRG: 948 | End: 2022-03-08
Attending: INTERNAL MEDICINE | Admitting: INTERNAL MEDICINE
Payer: MEDICARE

## 2022-03-04 VITALS
HEIGHT: 60 IN | SYSTOLIC BLOOD PRESSURE: 142 MMHG | TEMPERATURE: 97.3 F | BODY MASS INDEX: 20.1 KG/M2 | OXYGEN SATURATION: 91 % | RESPIRATION RATE: 18 BRPM | HEART RATE: 71 BPM | WEIGHT: 102.4 LBS | DIASTOLIC BLOOD PRESSURE: 67 MMHG

## 2022-03-04 PROBLEM — R53.81 PHYSICAL DEBILITY: Status: ACTIVE | Noted: 2022-03-04

## 2022-03-04 LAB
ANION GAP SERPL CALCULATED.3IONS-SCNC: 11 MMOL/L (ref 4–16)
BASOPHILS ABSOLUTE: 0 K/CU MM
BASOPHILS RELATIVE PERCENT: 0.2 % (ref 0–1)
BUN BLDV-MCNC: 16 MG/DL (ref 6–23)
CALCIUM SERPL-MCNC: 7.3 MG/DL (ref 8.3–10.6)
CHLORIDE BLD-SCNC: 112 MMOL/L (ref 99–110)
CO2: 21 MMOL/L (ref 21–32)
CREAT SERPL-MCNC: 1.2 MG/DL (ref 0.6–1.1)
DIFFERENTIAL TYPE: ABNORMAL
EOSINOPHILS ABSOLUTE: 0 K/CU MM
EOSINOPHILS RELATIVE PERCENT: 0.7 % (ref 0–3)
GFR AFRICAN AMERICAN: 51 ML/MIN/1.73M2
GFR NON-AFRICAN AMERICAN: 42 ML/MIN/1.73M2
GLUCOSE BLD-MCNC: 92 MG/DL (ref 70–99)
HCT VFR BLD CALC: 37.5 % (ref 37–47)
HEMOGLOBIN: 12.4 GM/DL (ref 12.5–16)
IMMATURE NEUTROPHIL %: 0.2 % (ref 0–0.43)
LYMPHOCYTES ABSOLUTE: 1.7 K/CU MM
LYMPHOCYTES RELATIVE PERCENT: 41.5 % (ref 24–44)
MAGNESIUM: 1.6 MG/DL (ref 1.8–2.4)
MCH RBC QN AUTO: 29.7 PG (ref 27–31)
MCHC RBC AUTO-ENTMCNC: 33.1 % (ref 32–36)
MCV RBC AUTO: 89.9 FL (ref 78–100)
MONOCYTES ABSOLUTE: 0.6 K/CU MM
MONOCYTES RELATIVE PERCENT: 13.6 % (ref 0–4)
PDW BLD-RTO: 14.5 % (ref 11.7–14.9)
PLATELET # BLD: 112 K/CU MM (ref 140–440)
PMV BLD AUTO: 10.2 FL (ref 7.5–11.1)
POTASSIUM SERPL-SCNC: 3.5 MMOL/L (ref 3.5–5.1)
PRO-BNP: 1291 PG/ML
RBC # BLD: 4.17 M/CU MM (ref 4.2–5.4)
SEGMENTED NEUTROPHILS ABSOLUTE COUNT: 1.8 K/CU MM
SEGMENTED NEUTROPHILS RELATIVE PERCENT: 43.8 % (ref 36–66)
SODIUM BLD-SCNC: 144 MMOL/L (ref 135–145)
TOTAL IMMATURE NEUTOROPHIL: 0.01 K/CU MM
WBC # BLD: 4.1 K/CU MM (ref 4–10.5)

## 2022-03-04 PROCEDURE — 36415 COLL VENOUS BLD VENIPUNCTURE: CPT

## 2022-03-04 PROCEDURE — 2580000003 HC RX 258: Performed by: NURSE PRACTITIONER

## 2022-03-04 PROCEDURE — 85025 COMPLETE CBC W/AUTO DIFF WBC: CPT

## 2022-03-04 PROCEDURE — 94761 N-INVAS EAR/PLS OXIMETRY MLT: CPT

## 2022-03-04 PROCEDURE — 97166 OT EVAL MOD COMPLEX 45 MIN: CPT

## 2022-03-04 PROCEDURE — 83735 ASSAY OF MAGNESIUM: CPT

## 2022-03-04 PROCEDURE — 97162 PT EVAL MOD COMPLEX 30 MIN: CPT

## 2022-03-04 PROCEDURE — 94640 AIRWAY INHALATION TREATMENT: CPT

## 2022-03-04 PROCEDURE — 1200000002 HC SEMI PRIVATE SWING BED

## 2022-03-04 PROCEDURE — 80048 BASIC METABOLIC PNL TOTAL CA: CPT

## 2022-03-04 PROCEDURE — 6360000002 HC RX W HCPCS: Performed by: NURSE PRACTITIONER

## 2022-03-04 PROCEDURE — 83880 ASSAY OF NATRIURETIC PEPTIDE: CPT

## 2022-03-04 PROCEDURE — 97530 THERAPEUTIC ACTIVITIES: CPT

## 2022-03-04 PROCEDURE — 6370000000 HC RX 637 (ALT 250 FOR IP): Performed by: NURSE PRACTITIONER

## 2022-03-04 RX ORDER — POLYETHYLENE GLYCOL 3350 17 G/17G
17 POWDER, FOR SOLUTION ORAL DAILY PRN
Status: DISCONTINUED | OUTPATIENT
Start: 2022-03-04 | End: 2022-03-08 | Stop reason: HOSPADM

## 2022-03-04 RX ORDER — OSELTAMIVIR PHOSPHATE 30 MG/1
30 CAPSULE ORAL DAILY
Status: COMPLETED | OUTPATIENT
Start: 2022-03-05 | End: 2022-03-06

## 2022-03-04 RX ORDER — ACETAMINOPHEN 325 MG/1
650 TABLET ORAL EVERY 6 HOURS PRN
Status: DISCONTINUED | OUTPATIENT
Start: 2022-03-04 | End: 2022-03-08 | Stop reason: HOSPADM

## 2022-03-04 RX ORDER — ACETAMINOPHEN 325 MG/1
650 TABLET ORAL EVERY 6 HOURS PRN
Status: CANCELLED | OUTPATIENT
Start: 2022-03-04

## 2022-03-04 RX ORDER — SODIUM CHLORIDE 9 MG/ML
250 INJECTION, SOLUTION INTRAVENOUS PRN
Status: DISCONTINUED | OUTPATIENT
Start: 2022-03-04 | End: 2022-03-04 | Stop reason: HOSPADM

## 2022-03-04 RX ORDER — ACETAMINOPHEN 650 MG/1
650 SUPPOSITORY RECTAL EVERY 6 HOURS PRN
Status: CANCELLED | OUTPATIENT
Start: 2022-03-04

## 2022-03-04 RX ORDER — LEVOTHYROXINE SODIUM 0.07 MG/1
75 TABLET ORAL DAILY
Status: CANCELLED | OUTPATIENT
Start: 2022-03-05

## 2022-03-04 RX ORDER — LEVOTHYROXINE SODIUM 0.07 MG/1
75 TABLET ORAL DAILY
Status: DISCONTINUED | OUTPATIENT
Start: 2022-03-05 | End: 2022-03-08 | Stop reason: HOSPADM

## 2022-03-04 RX ORDER — IPRATROPIUM BROMIDE AND ALBUTEROL SULFATE 2.5; .5 MG/3ML; MG/3ML
1 SOLUTION RESPIRATORY (INHALATION)
Status: CANCELLED | OUTPATIENT
Start: 2022-03-04

## 2022-03-04 RX ORDER — OSELTAMIVIR PHOSPHATE 30 MG/1
30 CAPSULE ORAL DAILY
Status: CANCELLED | OUTPATIENT
Start: 2022-03-04 | End: 2022-03-06

## 2022-03-04 RX ORDER — POLYETHYLENE GLYCOL 3350 17 G/17G
17 POWDER, FOR SOLUTION ORAL DAILY PRN
Status: CANCELLED | OUTPATIENT
Start: 2022-03-04

## 2022-03-04 RX ORDER — PANTOPRAZOLE SODIUM 40 MG/1
40 TABLET, DELAYED RELEASE ORAL
Status: DISCONTINUED | OUTPATIENT
Start: 2022-03-05 | End: 2022-03-08 | Stop reason: HOSPADM

## 2022-03-04 RX ORDER — MAGNESIUM SULFATE IN WATER 40 MG/ML
2000 INJECTION, SOLUTION INTRAVENOUS ONCE
Status: DISCONTINUED | OUTPATIENT
Start: 2022-03-04 | End: 2022-03-04 | Stop reason: HOSPADM

## 2022-03-04 RX ORDER — METOPROLOL SUCCINATE 25 MG/1
12.5 TABLET, EXTENDED RELEASE ORAL DAILY
Status: DISCONTINUED | OUTPATIENT
Start: 2022-03-05 | End: 2022-03-08 | Stop reason: HOSPADM

## 2022-03-04 RX ORDER — IPRATROPIUM BROMIDE AND ALBUTEROL SULFATE 2.5; .5 MG/3ML; MG/3ML
1 SOLUTION RESPIRATORY (INHALATION)
Status: DISCONTINUED | OUTPATIENT
Start: 2022-03-04 | End: 2022-03-08 | Stop reason: HOSPADM

## 2022-03-04 RX ORDER — ACETAMINOPHEN 650 MG/1
650 SUPPOSITORY RECTAL EVERY 6 HOURS PRN
Status: DISCONTINUED | OUTPATIENT
Start: 2022-03-04 | End: 2022-03-08 | Stop reason: HOSPADM

## 2022-03-04 RX ORDER — PANTOPRAZOLE SODIUM 40 MG/1
40 TABLET, DELAYED RELEASE ORAL
Status: CANCELLED | OUTPATIENT
Start: 2022-03-05

## 2022-03-04 RX ORDER — METOPROLOL SUCCINATE 25 MG/1
12.5 TABLET, EXTENDED RELEASE ORAL DAILY
Status: CANCELLED | OUTPATIENT
Start: 2022-03-04

## 2022-03-04 RX ADMIN — PANTOPRAZOLE SODIUM 40 MG: 40 TABLET, DELAYED RELEASE ORAL at 05:27

## 2022-03-04 RX ADMIN — MAGNESIUM SULFATE HEPTAHYDRATE 2000 MG: 2 INJECTION, SOLUTION INTRAVENOUS at 12:02

## 2022-03-04 RX ADMIN — OSELTAMIVIR 30 MG: 30 CAPSULE ORAL at 07:48

## 2022-03-04 RX ADMIN — SODIUM CHLORIDE: 9 INJECTION, SOLUTION INTRAVENOUS at 11:59

## 2022-03-04 RX ADMIN — IPRATROPIUM BROMIDE AND ALBUTEROL SULFATE 1 AMPULE: 2.5; .5 SOLUTION RESPIRATORY (INHALATION) at 20:22

## 2022-03-04 RX ADMIN — METOPROLOL SUCCINATE 12.5 MG: 25 TABLET, EXTENDED RELEASE ORAL at 07:49

## 2022-03-04 RX ADMIN — ENOXAPARIN SODIUM 30 MG: 100 INJECTION SUBCUTANEOUS at 07:48

## 2022-03-04 RX ADMIN — LEVOTHYROXINE SODIUM 75 MCG: 0.07 TABLET ORAL at 05:27

## 2022-03-04 RX ADMIN — IPRATROPIUM BROMIDE AND ALBUTEROL SULFATE 1 AMPULE: 2.5; .5 SOLUTION RESPIRATORY (INHALATION) at 16:00

## 2022-03-04 RX ADMIN — IPRATROPIUM BROMIDE AND ALBUTEROL SULFATE 1 AMPULE: 2.5; .5 SOLUTION RESPIRATORY (INHALATION) at 11:45

## 2022-03-04 RX ADMIN — IPRATROPIUM BROMIDE AND ALBUTEROL SULFATE 1 AMPULE: 2.5; .5 SOLUTION RESPIRATORY (INHALATION) at 07:20

## 2022-03-04 ASSESSMENT — PAIN SCALES - GENERAL: PAINLEVEL_OUTOF10: 0

## 2022-03-04 NOTE — PROGRESS NOTES
Occupational Therapy   Occupational Therapy Initial Assessment  Date: 3/4/2022   Patient Name: Killian Vance  MRN: [id number]     : 3/26/1931    Date of Service: 3/4/2022    Discharge Recommendations:  Continue to assess pending progress,24 hour supervision or assist,Home with Home health OT,Home with nursing aide       Assessment   Performance deficits / Impairments: Decreased functional mobility ; Decreased ADL status; Decreased cognition;Decreased endurance  Assessment: Patient is a 80 y.o female who presented to the hospital for altered mental status. She is now a swing bed for rehab. Patient presents with general weakness, decreased safety with transfers, and decreased I with adls. Patient will be seen by skilled OT to improve independence with ADLs, functional transfers, and activity tolerance for general strengthening  Treatment Diagnosis: generalized weakness  Prognosis: Good  Decision Making: Medium Complexity  History: see above  Exam: see above  OT Education: OT Role;Transfer Training  REQUIRES OT FOLLOW UP: Yes  Activity Tolerance  Activity Tolerance: Patient Tolerated treatment well;Treatment limited secondary to decreased cognition  Safety Devices  Safety Devices in place: Yes  Type of devices: Gait belt;Left in bed;Nurse notified; Bed alarm in place (telemonitor and sitter)           Patient Diagnosis(es): There were no encounter diagnoses. has a past medical history of Arthritis and Thyroid disease. has a past surgical history that includes Tonsillectomy; Appendectomy; Cholecystectomy; Hysterectomy; eye surgery; and fracture surgery.     Treatment Diagnosis: generalized weakness      Restrictions  Restrictions/Precautions  Restrictions/Precautions: Fall Risk  Required Braces or Orthoses?: No  Position Activity Restriction  Spinal Precautions:  (There are no spinal precautions)  Other position/activity restrictions: IV, very hard of hearing; hears best on R side    Subjective   General  Chart Reviewed: Yes  Patient assessed for rehabilitation services?: Yes  Family / Caregiver Present: No  Subjective  Subjective: Patient supine in bed, HOB elevated upon arrival.  Patient Currently in Pain: Denies  Pain Assessment  Pain Assessment: 0-10  Pain Level: 0  Vital Signs  Temp: 97.8 °F (36.6 °C)  Temp Source: Temporal  Pulse: 84  Heart Rate Source: Monitor  Resp: 18  BP: (!) 157/93  BP Location: Left upper arm  MAP (mmHg): 113  Patient Position: Sitting  Patient Currently in Pain: Denies  Oxygen Therapy  SpO2: 94 %  O2 Device: None (Room air)  O2 Flow Rate (L/min): 0 L/min  Social/Functional History  Social/Functional History  Lives With: Family  Type of Home: House  Home Layout: Performs ADL's on one level  Home Access: Stairs to enter without rails  Entrance Stairs - Number of Steps: 2 elpidio,has a rail  Bathroom Toilet: Standard  Home Equipment: Rolling walker  Receives Help From: Family  ADL Assistance: Needs assistance  Bath: Minimal assistance  Dressing: Minimal assistance  Grooming: Stand by assistance  Feeding: Independent  Toileting: Independent  Homemaking Responsibilities: No  Ambulation Assistance: Needs assistance (has a rolling walker but does not always use it)  Transfer Assistance: Independent  Active : No  Occupation: Retired  Type of occupation: was a   Leisure & Hobbies: TV  Additional Comments: Pt is very hard of hearing; She states she has been independent with adls; her helper had told  she needs minimal assist for adls       Objective   Vision Exceptions: Wears glasses at all times  Hearing: Exceptions to [facility]  Hearing Exceptions: Hard of hearing/hearing concerns; No hearing aid (does hear better in R ear)    Orientation  Overall Orientation Status:  (was unable to answer; knew her birthday)  Observation/Palpation  Posture: Poor  Observation: In bed, HOB elevated; When she stood she had forward posture;  She has an unsteady gait  Balance  Sitting Balance: Supervision  Standing Balance: Contact guard assistance  Functional Mobility  Functional - Mobility Device: Rolling Walker  Activity:  (12 ft around the bed)  Assist Level: Contact guard assistance  Functional Mobility Comments: mod vc for hand placement  ADL  Feeding: Setup  Grooming: Setup  UE Bathing: Stand by assistance (simulated)  LE Dressing: Contact guard assistance (for socks)  Tone RUE  RUE Tone: Normotonic  Tone LUE  LUE Tone: Normotonic  Coordination  Movements Are Fluid And Coordinated: Yes     Bed mobility  Supine to Sit: Stand by assistance  Sit to Supine: Stand by assistance  Scooting: Minimal assistance;2 Person assistance  Transfers  Sit to stand: Contact guard assistance  Stand to sit: Contact guard assistance     Cognition  Overall Cognitive Status: Exceptions  Arousal/Alertness:  (very hard of hearing)  Following Commands:  Follows one step commands with increased time  Attention Span: Attends with cues to redirect  Memory: Decreased recall of precautions;Decreased recall of recent events;Decreased short term memory  Safety Judgement: Decreased awareness of need for assistance;Decreased awareness of need for safety  Problem Solving: Assistance required to generate solutions;Assistance required to implement solutions;Decreased awareness of errors;Assistance required to correct errors made;Assistance required to identify errors made  Insights: Decreased awareness of deficits  Initiation: Requires cues for all  Sequencing: Requires cues for all        Sensation  Overall Sensation Status: WFL (per patient)        LUE PROM (degrees)  LUE PROM: WFL  LUE AROM (degrees)  LUE AROM : WFL  LUE Strength  Gross LUE Strength: WFL  LUE Strength Comment: general weakness  RUE Strength  Gross RUE Strength: WFL  RUE Strength Comment: general weakness                   Plan   Plan  Times per week: 3+  Current Treatment Recommendations: Endurance Training,Self-Care / ADL,Functional Mobility Training,Safety Education & Training,Patient/Caregiver Education & Training    G-Code     OutComes Score                                                  AM-PAC Score    AM-PAC 6 click short form for inpatient daily activity:   How much help from another person does the patient currently need. .. Unable  Dep A Lot  Max A A Lot   Mod A A Little  Min A A Little   CGA  SBA None   Mod I  Indep  Sup   1. Putting on and taking off regular lower body clothing? [] 1    [] 2   [x] 2   [] 3   [] 3   [] 4      2. Bathing (including washing, rinsing, drying)? [] 1   [] 2   [x] 2 [] 3 [] 3 [] 4   3. Toileting, which includes using toilet, bedpan, or urinal? [] 1    [] 2   [x] 2   [] 3   [] 3   [] 4     4. Putting on and taking off regular upper body clothing? [] 1   [] 2   [] 2   [x] 3   [] 3    [] 4      5. Taking care of personal grooming such as brushing teeth? [] 1   [] 2    [] 2 [x] 3    [] 3   [] 4      6. Eating meals?    [] 1   [] 2   [] 2   [] 3   [] 3   [x] 4      Raw Score: 16/24  40-59% impaired     [24=0% impaired(CH), 23=1-19%(CI), 20-22=20-39%(CJ), 15-19=40-59%(CK), 10-14=60-79%(CL), 7-9=80-99%(CM), 6=100%(CN)]            Goals  Short term goals  Time Frame for Short term goals: until discharge  Short term goal 1: Patient will be MI for functional transfers to chair, toilet, or bed without LOB or falls using AE PRN  Short term goal 2: Pt will be MI for toileting and grooming at the sink  Short term goal 3: Patient will be S for UB/LB bathing and dressing using AE PRN  Short term goal 4: Pt will be min vc for BUE and general strengthening  to improve endurance and strength for transfers       Therapy Time   Individual Concurrent Group Co-treatment   Time In 1245         Time Out 1315         Minutes 30         Timed Code Treatment Minutes: 1415 [facility], OT

## 2022-03-04 NOTE — PLAN OF CARE
Problem: Falls - Risk of:  Goal: Will remain free from falls  Description: Will remain free from falls  3/4/2022 1214 by Noe Perkins RN  Outcome: Completed  3/4/2022 0745 by Noe Perkins RN  Outcome: Ongoing  Goal: Absence of physical injury  Description: Absence of physical injury  3/4/2022 1214 by Noe Perkins RN  Outcome: Completed  3/4/2022 0745 by Noe Perkins RN  Outcome: Ongoing     Problem: Skin Integrity:  Goal: Will show no infection signs and symptoms  Description: Will show no infection signs and symptoms  3/4/2022 1214 by Noe Perkins RN  Outcome: Completed  3/4/2022 0745 by Noe Perkins RN  Outcome: Ongoing  Goal: Absence of new skin breakdown  Description: Absence of new skin breakdown  3/4/2022 1214 by Noe Perkins RN  Outcome: Completed  3/4/2022 0745 by Noe Perkins RN  Outcome: Ongoing

## 2022-03-04 NOTE — DISCHARGE SUMMARY
Discharge Summary    Name:  Sudhakar Landers /Age/Sex: 3/26/1931  (80 y.o. female)   MRN & CSN:  [id number] & [id number] Admission Date/Time: 3/1/2022  1:19 PM   Attending:  Harman Hand MD Discharging Physician: ALEJANDRO Ray NP     Hospital Course:   Sudhakar Landers is a 80 y.o.  female  who presents with AMS (altered mental status)     Sudhakar Landers is a 80 y.o.  female  who presents with alteration of mental status. This is a 80-year-old female with a history of hypothyroidism, GERD, hypertension who presented to the ER via EMS with her family with complaints of altered mental status generalized fatigue and a cough. Rapid Covid was completed in the emergency department and was negative. Her niece states she had a cough yesterday with some mild sputum production. Her niece also tells me that she is mildly confused at home she was seen in the emergency room in 2022 for confusion as well. No fevers no shortness of breath room air saturations 98%. Denies any chest pain no abdominal pain no nausea no vomiting no diarrhea. Her daughter states that she noticed this morning patient was more altered with generalized weakness. Denies any focal weaknesses. Denies any recent falls. Patient does live with her niece. Her niece states that sometimes she will act like this when she has UTI however urinalysis does not show sign of infection but culture was ordered. Patient will be admitted for further evaluation and treatment. She will be discharged today and readmitted to the swing bed program.    1. Alteration in mental status, urinalysis completed and negative CT head completed and negative.  MRI shows NO acute processes, urine culture no growth, patient's mentation has improved she is now at baseline.   2. Urinary retention,will remove catheter today and do voiding trial, patient did well with voiding trial.  3. Mild Dementia, not on any medications, discussed with niece who lives with patient and is her caregiver and she does state that she has some mild dementia. 4. Hypothyroidism  Synthroid will be increased to 75 MCG's daily patient will follow up with her PCP when discharged from hospital  5. Hypertension, will continue metoprolol 25 mg daily  6. Arthritis, continue current home medications  7. Dehydration, resolved  8. Acute kidney injury on CKD, patient's creatinine is still at 1.2 GFR is 42.  9. GERD, continue Protonix  10. Complains of cough and congestion, will check respiratory viral panel. Respiratory viral panel was negative except for influenza A and coronavirus 229E by PCR which is not COVID-19. 11. Right ovarian cystic neoplasm, measured at 196 mm on the right side in January 2022 patient does follow with gynecology outpatient. She will follow up outpatient when she completes swing bed program  12. Influenza A, pt has been started on Tamiflu x5 days  13. Generalized weakness, pt will need PT/OT at discharge, she will go to Swing bed program.    The patient expressed appropriate understanding of and agreement with the discharge recommendations, medications, and plan.      Consults this admission:  Matt Emerson HOSPITALIST    Discharge Instruction:     Primary care physician:  within 2 weeks    Diet:  regular diet   Activity: activity as tolerated  Disposition: Discharged to: Swing bed program  Condition on discharge: Stable    Discharge Medications:        Medication List      ASK your doctor about these medications    levothyroxine 50 MCG tablet  Commonly known as: SYNTHROID  Ask about: Which instructions should I use?     metoprolol succinate 25 MG extended release tablet  Commonly known as: TOPROL XL     omeprazole 20 MG delayed release capsule  Commonly known as: PRILOSEC            Objective Findings at Discharge:   BP (!) 142/67   Pulse 71   Temp 97.3 °F (36.3 °C) (Infrared)   Resp 18   Ht 5' (1.524 m)   Wt 102 lb 6.4 oz (46.4 kg)   SpO2 91%   BMI 20.00 kg/m² PHYSICAL EXAM   GEN Awake female, sitting upright in bed in no apparent distress. Appears given age. Very hard of hearing  EYES Pupils are equally round. No scleral erythema, discharge, or conjunctivitis. HENT Mucous membranes are moist. Oral pharynx without exudates, no evidence of thrush. NECK Supple, no apparent thyromegaly or masses. RESP faint wheezes, no rales or rhonchi. Symmetric chest movement while on room air. CARDIO/VASC S1/S2 auscultated. Regular rate  Peripheral pulses equal bilaterally and palpable. No peripheral edema. GI Abdomen is soft without significant tenderness, masses, or guarding. Bowel sounds are normoactive. Rectal exam deferred.  No costovertebral angle tenderness. MSK No gross joint deformities. SKIN Normal coloration, warm, dry. NEURO Cranial nerves appear grossly intact, normal speech, no lateralizing weakness. PSYCH Awake, alert, oriented x 4. Affect appropriate. BMP/CBC  Recent Labs     03/02/22  0600 03/03/22  0345 03/04/22  0602    141 144   K 3.9 3.8 3.5    110 112*   CO2 21 20* 21   BUN 19 21 16   CREATININE 1.2* 1.2* 1.2*   WBC 6.5 4.8 4.1   HCT 43.1 37.0 37.5   * 115* 112*       IMAGING:  EXAMINATION:   CT OF THE HEAD WITHOUT CONTRAST  3/1/2022 2:21 pm       TECHNIQUE:   CT of the head was performed without the administration of intravenous   contrast. Dose modulation, iterative reconstruction, and/or weight based   adjustment of the mA/kV was utilized to reduce the radiation dose to as low   as reasonably achievable.       COMPARISON:   01/12/2022       HISTORY:   ORDERING SYSTEM PROVIDED HISTORY: [facility]   TECHNOLOGIST PROVIDED HISTORY:   Reason for exam:->[facility]   Has a \"code stroke\" or \"stroke alert\" been called? ->No   Decision Support Exception - unselect if not a suspected or confirmed   emergency medical condition->Emergency Medical Condition (MA)       FINDINGS:   BRAIN/VENTRICLES: There is no acute intracranial hemorrhage, mass effect or   midline shift.  No abnormal extra-axial fluid collection.  The gray-white   differentiation is maintained without evidence of an acute infarct. Hypoattenuation of the periventricular and subcortical white matter is   suggestive of chronic small vessel ischemic disease.  Mild diffuse   parenchymal volume loss is noted.   There is no evidence of hydrocephalus.       ORBITS: The bilateral globes are intact.       SINUSES: Mild mucoperiosteal thickening of the bilateral ethmoid air cells   and left sphenoid sinus is noted.       SOFT TISSUES/SKULL:  No acute abnormality of the visualized skull or soft   tissues.           Impression   No acute intracranial finding.  MRI may be obtained if clinically indicated.         ONE XRAY VIEW OF THE CHEST       3/1/2022 11:21 am       COMPARISON:   01/12/2022       HISTORY:   ORDERING SYSTEM PROVIDED HISTORY: [facility]   TECHNOLOGIST PROVIDED HISTORY:   Reason for exam:->ams       FINDINGS:   Exam mildly limited as patient's chin the overlies the lung apices.  The   cardial-pericardial silhouette is unremarkable in appearance. The lungs are   clear. No pneumothorax is found. No free air is seen. No acute bony   abnormality.           Impression   Unremarkable portable chest radiograph. MRI OF THE BRAIN WITHOUT CONTRAST  3/1/2022 6:49 pm       TECHNIQUE:   Multiplanar multisequence MRI of the brain was performed without the   administration of intravenous contrast.       COMPARISON:   03/01/2022 CT brain.       HISTORY:   ORDERING SYSTEM PROVIDED HISTORY: [facility]MS   TECHNOLOGIST PROVIDED HISTORY:   Reason for exam:->[facility]   Decision Support Exception - unselect if not a suspected or confirmed   emergency medical condition->Emergency Medical Condition (MA)   Reason for Exam: AMS       FINDINGS:   INTRACRANIAL STRUCTURES/VENTRICLES: There is no acute infarct. No mass effect   or midline shift.  No evidence of an acute intracranial hemorrhage.  Scattered   periventricular, deep, and subcortical white matter T2/FLAIR hyperintensities   are nonspecific and likely related to microvascular ischemic disease. Involution parenchymal changes.  No hydrocephalus.  The sellar/suprasellar   regions appear unremarkable.  The normal signal voids within the major   intracranial vessels appear maintained.       ORBITS: The visualized portion of the orbits demonstrate no acute   abnormality.  Bilateral lens replacement.       SINUSES: Mild mucosal thickening the paranasal sinuses.  Small left mastoid   effusion.       BONES/SOFT TISSUES: The bone marrow signal intensity appears normal. The soft   tissues demonstrate no acute abnormality.           Impression   1. No acute intracranial abnormality.  Specifically, no evidence of acute   infarct   2.  Involution parenchymal changes with moderate/severe microvascular ischemic   disease.             Discharge Time of 35 minutes    Electronically signed by ALEJANDRO Louie NP on 3/4/2022 at 11:39 AM

## 2022-03-04 NOTE — PLAN OF CARE
Problem: Falls - Risk of:  Goal: Will remain free from falls  Description: Will remain free from falls  3/4/2022 1313 by Jenny Luther RN  Outcome: Ongoing  3/4/2022 1311 by Jenny Luther RN  Outcome: Ongoing  Goal: Absence of physical injury  Description: Absence of physical injury  3/4/2022 1313 by Jenny Luther RN  Outcome: Ongoing  3/4/2022 1311 by Jenny Luther RN  Outcome: Ongoing     Problem: Infection:  Goal: Will remain free from infection  Description: Will remain free from infection  Outcome: Ongoing     Problem: Safety:  Goal: Free from accidental physical injury  Description: Free from accidental physical injury  Outcome: Ongoing  Goal: Free from intentional harm  Description: Free from intentional harm  Outcome: Ongoing     Problem: Daily Care:  Goal: Daily care needs are met  Description: Daily care needs are met  Outcome: Ongoing     Problem: Pain:  Goal: Patient's pain/discomfort is manageable  Description: Patient's pain/discomfort is manageable  Outcome: Ongoing     Problem: Skin Integrity:  Goal: Skin integrity will stabilize  Description: Skin integrity will stabilize  Outcome: Ongoing     Problem: Discharge Planning:  Goal: Patients continuum of care needs are met  Description: Patients continuum of care needs are met  Outcome: Ongoing

## 2022-03-04 NOTE — PROGRESS NOTES
Physical Therapy    Facility/Department: [facility] UNIT  Initial Assessment    NAME: Chris Torrez  : 3/26/1931  MRN: [id number]    Date of Service: 3/4/2022    Discharge Recommendations:  Home with assist PRN        Assessment   Body structures, Functions, Activity limitations: Decreased functional mobility ; Decreased endurance;Decreased strength;Decreased high-level IADLs  Assessment: Patient is a 80 female admitted to [facility] bed with resolving AMS/debility - patient's current function is below her usual baseline and patient would benefit from PT to prepare her to return home  Treatment Diagnosis: impaired mobility/debility  Prognosis: Good  Decision Making: Medium Complexity  History: PF - age  Exam: ROM/ strength/bed mobility/transfers/ gait  Clinical Presentation: evolving  Barriers to Learning: hearing  REQUIRES PT FOLLOW UP: Yes       Patient Diagnosis(es): There were no encounter diagnoses. has a past medical history of Arthritis and Thyroid disease. has a past surgical history that includes Tonsillectomy; Appendectomy; Cholecystectomy; Hysterectomy; eye surgery; and fracture surgery. Restrictions  Restrictions/Precautions  Restrictions/Precautions: Fall Risk,Contact Precautions  Required Braces or Orthoses?: No  Position Activity Restriction  Spinal Precautions:  (There are no spinal precautions)  Other position/activity restrictions: IV, very hard of hearing; hears best on R side  Vision/Hearing  Vision Exceptions: Wears glasses at all times  Hearing Exceptions: Hard of hearing/hearing concerns; No hearing aid     Subjective  General  Chart Reviewed: Yes  Patient assessed for rehabilitation services?: Yes  Follows Commands: Within Functional Limits  Pain Screening  Patient Currently in Pain: Denies          Orientation  Orientation  Overall Orientation Status: Within Normal Limits  Social/Functional History  Social/Functional History  Lives With: Family  Type of Home:  [facility] Layout: Performs ADL's on one level  Home Access: Stairs to enter with rails  Entrance Stairs - Number of Steps: 3 elpidio,has a rail  Entrance Stairs - Rails: Both  Bathroom Toilet: Standard  Home Equipment: Rolling walker  Receives Help From: Family  ADL Assistance: Needs assistance  Bath: Minimal assistance  Dressing: Minimal assistance  Grooming: Stand by assistance  Feeding: Independent  Toileting: Independent  Homemaking Assistance: Needs assistance  Homemaking Responsibilities: No  Ambulation Assistance:  (uses RW)  Transfer Assistance: Independent  Active : No  Mode of Transportation: Family  Occupation: Retired  Type of occupation: was a   Leisure & Hobbies: TV  Additional Comments: Pt is very hard of hearing;  She states she has been independent with adls; her helper had told  she needs minimal assist for adls  Cognition        Objective     Observation/Palpation  Observation: In bed, HOB elevated;    AROM RLE (degrees)  RLE AROM: WFL  PROM LLE (degrees)  LLE General PROM: L ankle ROM limited 2/2 old ankle fracture  Strength RLE  Comment: I  with SLR- strength grossly 3-/5 to 3+/5  Strength LLE  Comment: I SLR  strength grossly  3+/5        Bed mobility  Supine to Sit: Stand by assistance  Sit to Supine: Stand by assistance  Transfers  Sit to Stand: Contact guard assistance  Stand to sit: Contact guard assistance  Ambulation 1  Surface: level tile  Device: Rolling Walker  Assistance: Stand by assistance;Contact guard assistance  Gait Deviations: Slow Elsa     Balance  Sitting - Static: Good  Sitting - Dynamic: Good  Standing - Static: Fair;+  Standing - Dynamic: Fair;+        Plan   Plan  Times per week: Mon- Sat 5x/wk  Current Treatment Recommendations: Strengthening,Transfer Training,Endurance Training,Gait Training,Functional Mobility Training  Safety Devices  Type of devices: Gait belt,Call light within reach,Left in bed,Bed alarm in place    G-Code       OutComes Score AM-PAC Score             Goals  Short term goals  Short term goal 1: 1 week  Short term goal 2: patient will complete all bed mobility activity with mod I  Short term goal 3: patient will safely perform all transfers with SBA  Short term goal 5: patient will safely ascend/descend  a 6'\" step in // bars       Therapy Time   Individual Concurrent Group Co-treatment   Time In 1655         Time Out 1715         Minutes Kennedy Alfarohuessiej 15 Antoni Will, PT

## 2022-03-04 NOTE — PLAN OF CARE
Discussed with pt safety concerns when ambulating in the room  Problem: Falls - Risk of:  Goal: Will remain free from falls  Description: Will remain free from falls  3/4/2022 0745 by Alice Johnson RN  Outcome: Ongoing  3/3/2022 2106 by Ricky Bae RN  Outcome: Ongoing  Goal: Absence of physical injury  Description: Absence of physical injury  3/4/2022 0745 by Alice Johnson RN  Outcome: Ongoing  3/3/2022 2106 by Ricky Bae RN  Outcome: Ongoing     Problem: Skin Integrity:  Goal: Will show no infection signs and symptoms  Description: Will show no infection signs and symptoms  3/4/2022 0745 by Alice Johnson RN  Outcome: Ongoing  3/3/2022 2106 by Ricky Bae RN  Outcome: Ongoing  Goal: Absence of new skin breakdown  Description: Absence of new skin breakdown  3/4/2022 0745 by Alice Johnson RN  Outcome: Ongoing  3/3/2022 2106 by Ricky Bae RN  Outcome: Ongoing

## 2022-03-05 PROCEDURE — 6370000000 HC RX 637 (ALT 250 FOR IP): Performed by: NURSE PRACTITIONER

## 2022-03-05 PROCEDURE — 6360000002 HC RX W HCPCS: Performed by: NURSE PRACTITIONER

## 2022-03-05 PROCEDURE — 1200000002 HC SEMI PRIVATE SWING BED

## 2022-03-05 PROCEDURE — 97110 THERAPEUTIC EXERCISES: CPT

## 2022-03-05 PROCEDURE — 94761 N-INVAS EAR/PLS OXIMETRY MLT: CPT

## 2022-03-05 PROCEDURE — 97535 SELF CARE MNGMENT TRAINING: CPT

## 2022-03-05 PROCEDURE — 94640 AIRWAY INHALATION TREATMENT: CPT

## 2022-03-05 RX ORDER — HEPARIN SODIUM 5000 [USP'U]/ML
5000 INJECTION, SOLUTION INTRAVENOUS; SUBCUTANEOUS 2 TIMES DAILY
Status: DISCONTINUED | OUTPATIENT
Start: 2022-03-06 | End: 2022-03-08 | Stop reason: HOSPADM

## 2022-03-05 RX ADMIN — PANTOPRAZOLE SODIUM 40 MG: 40 TABLET, DELAYED RELEASE ORAL at 05:16

## 2022-03-05 RX ADMIN — OSELTAMIVIR 30 MG: 30 CAPSULE ORAL at 09:54

## 2022-03-05 RX ADMIN — LEVOTHYROXINE SODIUM 75 MCG: 0.07 TABLET ORAL at 05:15

## 2022-03-05 RX ADMIN — METOPROLOL SUCCINATE 12.5 MG: 25 TABLET, EXTENDED RELEASE ORAL at 09:52

## 2022-03-05 RX ADMIN — IPRATROPIUM BROMIDE AND ALBUTEROL SULFATE 1 AMPULE: 2.5; .5 SOLUTION RESPIRATORY (INHALATION) at 15:48

## 2022-03-05 RX ADMIN — IPRATROPIUM BROMIDE AND ALBUTEROL SULFATE 1 AMPULE: 2.5; .5 SOLUTION RESPIRATORY (INHALATION) at 11:47

## 2022-03-05 RX ADMIN — IPRATROPIUM BROMIDE AND ALBUTEROL SULFATE 1 AMPULE: 2.5; .5 SOLUTION RESPIRATORY (INHALATION) at 05:07

## 2022-03-05 RX ADMIN — IPRATROPIUM BROMIDE AND ALBUTEROL SULFATE 1 AMPULE: 2.5; .5 SOLUTION RESPIRATORY (INHALATION) at 07:52

## 2022-03-05 RX ADMIN — ENOXAPARIN SODIUM 30 MG: 100 INJECTION SUBCUTANEOUS at 09:53

## 2022-03-05 ASSESSMENT — PAIN SCALES - GENERAL
PAINLEVEL_OUTOF10: 0

## 2022-03-05 NOTE — PLAN OF CARE
Problem: Infection:  Goal: Will remain free from infection  Description: Will remain free from infection  Outcome: Met This Shift     Problem: Safety:  Goal: Free from accidental physical injury  Description: Free from accidental physical injury  Outcome: Met This Shift  Goal: Free from intentional harm  Description: Free from intentional harm  Outcome: Met This Shift     Problem: Pain:  Goal: Patient's pain/discomfort is manageable  Description: Patient's pain/discomfort is manageable  Outcome: Met This Shift     Problem: Skin Integrity:  Goal: Skin integrity will stabilize  Description: Skin integrity will stabilize  Outcome: Met This Shift

## 2022-03-05 NOTE — PROGRESS NOTES
RENAL DOSE ADJUSTMENT MADE PER P/T PROTOCOL    PREVIOUS ORDER:  Lovenox 30mg SQ daily    CrCl cannot be calculated (Unknown ideal weight.). CrCl ~24 ml/min  Weight 49kg    Recent Labs     03/03/22  0345 03/03/22  0345 03/04/22  0602   BUN 21  --  16   CREATININE 1.2*   < > 1.2*   *   < > 112*    < > = values in this interval not displayed. TABLE 1.   ENOXAPARIN ROUTINE PROPHYLAXIS DOSING (Medically ill, routine surgery)   Patient Weight (kg)     50.9 and below 51 - 100.9 101 - 150.9 151 - 174.9 175 or greater         Estimated CrCl  (ml/min) 30 or greater   30 mg SUBQ daily   40 mg SUBQ daily 30 mg SUBQ BID  40 mg SUBQ BID 60mg SUBQ BID      15-29 UFH 5000 units SUBQ BID   30 mg SUBQ daily 30 mg SUBQ daily 40 mg SUBQ daily   60 mg SUBQ daily      Less than 15 or Dialysis UFH 5000 units SUBQ BID   UFH 5000 units SUBQ TID UFH 7500 units SUBQ TID     NEW RENALLY ADJUSTED ORDER:  Heparin 5000 units twice daily    BARBARA Solis ES [facility]  3/5/2022 10:10 AM

## 2022-03-05 NOTE — PROGRESS NOTES
Occupational Therapy Treatment Note      Name: Sumaya Selby MRN: [id number] :   3/26/1931   Date:  3/5/2022   Admission Date: 3/4/2022 Room:  [address] [place] [address]     Primary Problem:  Deconditioning     Restrictions/Precautions:  Restrictions/Precautions  Restrictions/Precautions: Fall Risk,Contact Precautions  Required Braces or Orthoses?: No    Communication with other providers:  OK to see per nursing     Subjective:  Patient states:  Pt agreeable to therapy. She is confused throughout session but able to follow simple commands. Pain: reports some soreness in lower back following standing and fictional mobility activities, but she is not bale to quantify. She reports it gets better upon sitting.  (location, type, intensity)    Objective:    Observation:  Pt upright in chair       Treatment, including education:  Self Care Training:   Cues were given for safety, sequence, UE/LE placement, visual cues, and balance. Activities performed today included dressing, and grooming. Pt performs UB and LB dressing with SBA and cuing for safety. Pt performs functional mobility with RW to retrieve clothing from closet as well as to and from skin with SBA. She stands at sink for 2 minutes to perform oral hygiene routine with SBA. Therapeutic Exercise:  Cues were given for technique, safety, recruitment, and rationale. Cues were verbal and/or tactile. Pt performstherex while seated in recliner including forward rases, biceps curls, and forward rows one arm at a time for 10 reps with moderate cuing required for technique.        Assessment / Impression:    Patient's tolerance of treatment: good  Adverse Reaction: pain in lower back   Significant change in status and impact:  Improving independence with ADLs  Barriers to improvement:cognition       Plan for Next Session:    Continue per OT POC    Time in:  1100  Time out:  1123  Timed treatment minutes:  23  Total treatment time:  23      Electronically signed by Gladis Appiah Maliha Moss on 3/5/2022 at 11:55 AM

## 2022-03-05 NOTE — H&P
History and Physical      Name:  Narda Weston /Age/Sex: 3/26/1931  (80 y.o. female)   MRN & CSN:  [id number] & [id number] Admission Date/Time: 3/4/2022 12:17 PM   Location:   PCP: Lydia De La Cruz DO       Hospital Day: 2    Assessment and Plan:   Narda Weston is a 80 y.o.  female  who presents with Physical debility    1. Physical debility: Continue to work with PT OT to determine final disability and is in bed program  2. Acute encephalopathy: Resolved prior to discharge to swing bed program  3. Urinary retention: Resolved prior to discharge to swing bed program  4. Mild dementia: We will continue monitoring by PCP  5. Hypothyroidism: Continue Synthroid  6. Hypertension: Continue home medication  7. Arthritis: Home medications resumed  8. NARINDER on CKD: Resolved prior to discharge to swing bed program  9. GERD: Continue home Protonix  10. Recently diagnosed influenza: Continue Tamiflu will complete a total of 5 days  11. Right ovarian cystic neoplasm: Follows with OB/GYN will need to continue to follow upon discharge      Diet ADULT DIET; Regular   DVT Prophylaxis [] Lovenox, []  Heparin, [] SCDs, [] Ambulation   GI Prophylaxis [] PPI,  [] H2 Blocker,  [] Carafate,  [] Diet/Tube Feeds   Code Status DNR-CCA   Disposition Patient requires continued admission due to    MDM [] Low, [] Moderate,[]  High  Patient's risk as above due to      History of Present Illness:   Briefly this is a 81 y/o F that initially presented to [facility] with encephalopathy and generalized fatigue and cough found to have the flu. Patient progressed well throughout her hospital stay and all imaging was unremarkable. Patient was seen and evaluated by PT/OT and was a great candidate for the swing bed program to which the patient was discharged. Patient seen at bedside this morning stated that she was doing well; was a little tired as she had just woken.   No fevers chest pains nausea vomiting or shortness of breath. Ten point ROS reviewed negative, unless as noted above    Objective: Intake/Output Summary (Last 24 hours) at 3/5/2022 0931  Last data filed at 3/4/2022 1825  Gross per 24 hour   Intake 360 ml   Output 325 ml   Net 35 ml      Vitals:   Vitals:    03/05/22 0753   BP:    Pulse:    Resp: (!) 96   Temp:    SpO2:      Physical Exam:   GEN Awake female, sitting upright in bed in no apparent distress. Appears given age. EYES Pupils are equally round. No scleral erythema, discharge, or conjunctivitis. NECK Supple, no apparent thyromegaly or masses. RESP Clear to auscultation, no wheezes, rales or rhonchi. Symmetric chest movement while on room air. CARDIO/VASC S1/S2 auscultated. Regular rate without appreciable murmurs, rubs, or gallops. No peripheral edema. GI Abdomen is soft without significant tenderness, masses, or guarding   No costovertebral angle tenderness  HEME/LYMPH No petechiae or ecchymoses. MSK No gross joint deformities. SKIN Normal coloration, warm, dry. NEURO Cranial nerves appear grossly intact, normal speech  PSYCH Awake, alert, oriented x 1, affect appropriate    Past Medical History:      Past Medical History:   Diagnosis Date    Arthritis     Thyroid disease      PSHX:  has a past surgical history that includes Tonsillectomy; Appendectomy; Cholecystectomy; Hysterectomy; eye surgery; and fracture surgery. Allergies: No Known Allergies    FAM HX: Family history reviewed and non-contributory  Soc HX:   Social History     Socioeconomic History    Marital status:       Spouse name: Not on file    Number of children: Not on file    Years of education: Not on file    Highest education level: Not on file   Occupational History    Not on file   Tobacco Use    Smoking status: Never Smoker    Smokeless tobacco: Never Used   Substance and Sexual Activity    Alcohol use: No    Drug use: No    Sexual activity: Never   Other Topics Concern    Not on file   Social History Narrative    Not on file     Social Determinants of Health     Financial Resource Strain:     Difficulty of Paying Living Expenses: Not on file   Food Insecurity:     Worried About Running Out of Food in the Last Year: Not on file    Meredith of Food in the Last Year: Not on file   Transportation Needs:     Lack of Transportation (Medical): Not on file    Lack of Transportation (Non-Medical):  Not on file   Physical Activity:     Days of Exercise per Week: Not on file    Minutes of Exercise per Session: Not on file   Stress:     Feeling of Stress : Not on file   Social Connections:     Frequency of Communication with Friends and Family: Not on file    Frequency of Social Gatherings with Friends and Family: Not on file    Attends [employer] Services: Not on file    Active Member of Clubs or Organizations: Not on file    Attends Club or Organization Meetings: Not on file    Marital Status: Not on file   Intimate Partner Violence:     Fear of Current or Ex-Partner: Not on file    Emotionally Abused: Not on file    Physically Abused: Not on file    Sexually Abused: Not on file   Housing Stability:     Unable to Pay for Housing in the Last Year: Not on file    Number of Places Lived in the Last Year: Not on file    Unstable Housing in the Last Year: Not on file       Medications:   Medications:    enoxaparin  30 mg SubCUTAneous Daily    ipratropium-albuterol  1 ampule Inhalation Q4H WA    levothyroxine  75 mcg Oral Daily    metoprolol succinate  12.5 mg Oral Daily    oseltamivir  30 mg Oral Daily    pantoprazole  40 mg Oral QAM AC      Infusions:   PRN Meds: acetaminophen, 650 mg, Q6H PRN   Or  acetaminophen, 650 mg, Q6H PRN  polyethylene glycol, 17 g, Daily PRN          Electronically signed by Bossman Torres MD on 3/5/2022 at 9:31 AM

## 2022-03-06 LAB
ANION GAP SERPL CALCULATED.3IONS-SCNC: 9 MMOL/L (ref 4–16)
BUN BLDV-MCNC: 13 MG/DL (ref 6–23)
CALCIUM SERPL-MCNC: 7.6 MG/DL (ref 8.3–10.6)
CHLORIDE BLD-SCNC: 110 MMOL/L (ref 99–110)
CO2: 23 MMOL/L (ref 21–32)
CREAT SERPL-MCNC: 1.1 MG/DL (ref 0.6–1.1)
CULTURE: NORMAL
CULTURE: NORMAL
GFR AFRICAN AMERICAN: 56 ML/MIN/1.73M2
GFR NON-AFRICAN AMERICAN: 47 ML/MIN/1.73M2
GLUCOSE BLD-MCNC: 100 MG/DL (ref 70–99)
HCT VFR BLD CALC: 38.4 % (ref 37–47)
HEMOGLOBIN: 12.7 GM/DL (ref 12.5–16)
Lab: NORMAL
Lab: NORMAL
MCH RBC QN AUTO: 29.3 PG (ref 27–31)
MCHC RBC AUTO-ENTMCNC: 33.1 % (ref 32–36)
MCV RBC AUTO: 88.7 FL (ref 78–100)
PDW BLD-RTO: 14.4 % (ref 11.7–14.9)
PLATELET # BLD: 124 K/CU MM (ref 140–440)
PMV BLD AUTO: 10 FL (ref 7.5–11.1)
POTASSIUM SERPL-SCNC: 3.7 MMOL/L (ref 3.5–5.1)
RBC # BLD: 4.33 M/CU MM (ref 4.2–5.4)
SODIUM BLD-SCNC: 142 MMOL/L (ref 135–145)
SPECIMEN: NORMAL
SPECIMEN: NORMAL
WBC # BLD: 5.4 K/CU MM (ref 4–10.5)

## 2022-03-06 PROCEDURE — 85027 COMPLETE CBC AUTOMATED: CPT

## 2022-03-06 PROCEDURE — 1200000002 HC SEMI PRIVATE SWING BED

## 2022-03-06 PROCEDURE — 6370000000 HC RX 637 (ALT 250 FOR IP): Performed by: NURSE PRACTITIONER

## 2022-03-06 PROCEDURE — 6360000002 HC RX W HCPCS: Performed by: NURSE PRACTITIONER

## 2022-03-06 PROCEDURE — 36415 COLL VENOUS BLD VENIPUNCTURE: CPT

## 2022-03-06 PROCEDURE — 80048 BASIC METABOLIC PNL TOTAL CA: CPT

## 2022-03-06 PROCEDURE — 94640 AIRWAY INHALATION TREATMENT: CPT

## 2022-03-06 RX ADMIN — HEPARIN SODIUM 5000 UNITS: 5000 INJECTION INTRAVENOUS; SUBCUTANEOUS at 19:51

## 2022-03-06 RX ADMIN — HEPARIN SODIUM 5000 UNITS: 5000 INJECTION INTRAVENOUS; SUBCUTANEOUS at 08:24

## 2022-03-06 RX ADMIN — IPRATROPIUM BROMIDE AND ALBUTEROL SULFATE 1 AMPULE: 2.5; .5 SOLUTION RESPIRATORY (INHALATION) at 19:31

## 2022-03-06 RX ADMIN — METOPROLOL SUCCINATE 12.5 MG: 25 TABLET, EXTENDED RELEASE ORAL at 08:23

## 2022-03-06 RX ADMIN — IPRATROPIUM BROMIDE AND ALBUTEROL SULFATE 1 AMPULE: 2.5; .5 SOLUTION RESPIRATORY (INHALATION) at 07:25

## 2022-03-06 RX ADMIN — PANTOPRAZOLE SODIUM 40 MG: 40 TABLET, DELAYED RELEASE ORAL at 05:49

## 2022-03-06 RX ADMIN — IPRATROPIUM BROMIDE AND ALBUTEROL SULFATE 1 AMPULE: 2.5; .5 SOLUTION RESPIRATORY (INHALATION) at 14:57

## 2022-03-06 RX ADMIN — IPRATROPIUM BROMIDE AND ALBUTEROL SULFATE 1 AMPULE: 2.5; .5 SOLUTION RESPIRATORY (INHALATION) at 11:27

## 2022-03-06 RX ADMIN — OSELTAMIVIR 30 MG: 30 CAPSULE ORAL at 08:23

## 2022-03-06 RX ADMIN — LEVOTHYROXINE SODIUM 75 MCG: 0.07 TABLET ORAL at 05:49

## 2022-03-06 ASSESSMENT — PAIN SCALES - WONG BAKER: WONGBAKER_NUMERICALRESPONSE: 0

## 2022-03-06 ASSESSMENT — PAIN SCALES - GENERAL
PAINLEVEL_OUTOF10: 0
PAINLEVEL_OUTOF10: 0

## 2022-03-06 NOTE — PLAN OF CARE
Problem: Falls - Risk of:  Goal: Will remain free from falls  Description: Will remain free from falls  3/6/2022 0949 by Jenniffer Babcock RN  Outcome: Ongoing  3/5/2022 2002 by Kumar Riggins RN  Outcome: Ongoing  Goal: Absence of physical injury  Description: Absence of physical injury  3/6/2022 0949 by Jenniffer Babcock RN  Outcome: Ongoing  3/5/2022 2002 by Kumar Riggins RN  Outcome: Ongoing     Problem: Infection:  Goal: Will remain free from infection  Description: Will remain free from infection  3/6/2022 0949 by Jenniffer Babcock RN  Outcome: Ongoing  3/5/2022 2002 by Kumar Riggins RN  Outcome: Ongoing     Problem: Safety:  Goal: Free from accidental physical injury  Description: Free from accidental physical injury  3/6/2022 0949 by Jenniffer Babcock RN  Outcome: Ongoing  3/5/2022 2002 by Kumar Riggins RN  Outcome: Ongoing  Goal: Free from intentional harm  Description: Free from intentional harm  3/6/2022 0949 by Jenniffer Babcock RN  Outcome: Ongoing  3/5/2022 2002 by Kumar Riggins RN  Outcome: Ongoing     Problem: Daily Care:  Goal: Daily care needs are met  Description: Daily care needs are met  3/6/2022 0949 by Jenniffer Babcock RN  Outcome: Ongoing  3/5/2022 2002 by Kumar Riggins RN  Outcome: Ongoing     Problem: Pain:  Goal: Patient's pain/discomfort is manageable  Description: Patient's pain/discomfort is manageable  3/6/2022 0949 by Jenniffer Babcock RN  Outcome: Ongoing  3/5/2022 2002 by Kumar Riggins RN  Outcome: Ongoing     Problem: Skin Integrity:  Goal: Skin integrity will stabilize  Description: Skin integrity will stabilize  3/6/2022 0949 by Jenniffer Babcock RN  Outcome: Ongoing  3/5/2022 2002 by Kumar Riggins RN  Outcome: Ongoing     Problem: Discharge Planning:  Goal: Patients continuum of care needs are met  Description: Patients continuum of care needs are met  3/6/2022 0949 by Jenniffer Babcock RN  Outcome: Ongoing  3/5/2022 2002 by Elie Manzo RN  Outcome: Ongoing     Problem: Skin Integrity:  Goal: Will show no infection signs and symptoms  Description: Will show no infection signs and symptoms  3/6/2022 0949 by Lucretia Comer RN  Outcome: Ongoing  3/5/2022 2002 by Elie Manzo RN  Outcome: Ongoing  Goal: Absence of new skin breakdown  Description: Absence of new skin breakdown  3/6/2022 0949 by Lucretia Comer RN  Outcome: Ongoing  3/5/2022 2002 by Elie Manzo RN  Outcome: Ongoing

## 2022-03-07 PROCEDURE — 94640 AIRWAY INHALATION TREATMENT: CPT

## 2022-03-07 PROCEDURE — 97530 THERAPEUTIC ACTIVITIES: CPT

## 2022-03-07 PROCEDURE — 1200000002 HC SEMI PRIVATE SWING BED

## 2022-03-07 PROCEDURE — 6370000000 HC RX 637 (ALT 250 FOR IP): Performed by: NURSE PRACTITIONER

## 2022-03-07 PROCEDURE — 97116 GAIT TRAINING THERAPY: CPT

## 2022-03-07 PROCEDURE — 6360000002 HC RX W HCPCS: Performed by: NURSE PRACTITIONER

## 2022-03-07 RX ADMIN — HEPARIN SODIUM 5000 UNITS: 5000 INJECTION INTRAVENOUS; SUBCUTANEOUS at 20:22

## 2022-03-07 RX ADMIN — HEPARIN SODIUM 5000 UNITS: 5000 INJECTION INTRAVENOUS; SUBCUTANEOUS at 09:12

## 2022-03-07 RX ADMIN — IPRATROPIUM BROMIDE AND ALBUTEROL SULFATE 1 AMPULE: 2.5; .5 SOLUTION RESPIRATORY (INHALATION) at 07:50

## 2022-03-07 RX ADMIN — IPRATROPIUM BROMIDE AND ALBUTEROL SULFATE 1 AMPULE: 2.5; .5 SOLUTION RESPIRATORY (INHALATION) at 19:49

## 2022-03-07 RX ADMIN — METOPROLOL SUCCINATE 12.5 MG: 25 TABLET, EXTENDED RELEASE ORAL at 09:13

## 2022-03-07 RX ADMIN — IPRATROPIUM BROMIDE AND ALBUTEROL SULFATE 1 AMPULE: 2.5; .5 SOLUTION RESPIRATORY (INHALATION) at 14:48

## 2022-03-07 RX ADMIN — IPRATROPIUM BROMIDE AND ALBUTEROL SULFATE 1 AMPULE: 2.5; .5 SOLUTION RESPIRATORY (INHALATION) at 11:03

## 2022-03-07 RX ADMIN — PANTOPRAZOLE SODIUM 40 MG: 40 TABLET, DELAYED RELEASE ORAL at 05:43

## 2022-03-07 RX ADMIN — LEVOTHYROXINE SODIUM 75 MCG: 0.07 TABLET ORAL at 05:43

## 2022-03-07 ASSESSMENT — PAIN SCALES - GENERAL
PAINLEVEL_OUTOF10: 0

## 2022-03-07 NOTE — PROGRESS NOTES
Occupational Therapy    Occupational Therapy Treatment Note  Name: Killian Vance MRN: [id number] :   3/26/1931   Date:  3/7/2022   Admission Date: 3/4/2022 Room:  [address] [place] [address]   Restrictions/Precautions:  Restrictions/Precautions  Restrictions/Precautions: Fall Risk,Contact Precautions  Required Braces or Orthoses?: No     Communication with other providers:   Cleared for treatment by RN. Subjective:  Patient states:  \"I want to go home\"  Pain:   Location, Type, Intensity (0/10 to 10/10): No pain    Objective:    Observation:  Pt alert and oriented     Treatment, including education:  Transfers    Sit to stand :CGA  Stand to sit :CGA  SPT:CGA          Therapeutic Activity Training: Pt stood x 3-4 min x 2 with CGA with RW to facilitate increased endurance/strength for ADL tasks and transfers. Pt completed 2 curbs Steps with RW with CGA. Pt completed functional mobility with CGA x 75' x 2 and mod verbal cues for walker safety. Safety Measures: Gait belt used, Left in bed, Pull/Bed Alarm activated and call light left in reach        Assessment / Impression:        Patient's tolerance of treatment: Good   Adverse Reaction: None  Significant change in status and impact:  None  Barriers to improvement:  Dec strength and endurance    Plan for Next Session:    Continue with POC.     Time in:  1025  Time out: 1050  Total treatment time:  25  Billed Units: 2 TA  Electronically signed by:    Mckenna Bates, [address]    3/7/2022, 2:45 PM    Previously filed values:     Short term goals  Time Frame for Short term goals: until discharge  Short term goal 1: Patient will be MI for functional transfers to chair, toilet, or bed without LOB or falls using AE PRN  Short term goal 2: Pt will be MI for toileting and grooming at the sink  Short term goal 3: Patient will be S for UB/LB bathing and dressing using AE PRN  Short term goal 4: Pt will be min vc for BUE and general strengthening  to improve endurance and strength for transfers

## 2022-03-07 NOTE — PROGRESS NOTES
Comprehensive Nutrition Assessment    Type and Reason for Visit:  Initial (Swing Bed)    Nutrition Recommendations/Plan: start oral supplement TID and montior    Nutrition Assessment:  Pt admitted to swing bed after acute care stay for influenza A, NARINDER. She has HOLLY with confusion. HX of dementia, GERD, HTN. Oral intakes vary 1-75% therefore, will start oral supplement TID    Malnutrition Assessment:  Malnutrition Status:  Insufficient data    Context:  Acute Illness         Estimated Daily Nutrient Needs:  Energy (kcal):  5271-8118; Weight Used for Energy Requirements:  Current     Protein (g):  49-59; Weight Used for Protein Requirements:  Current (1-1.2)        Fluid (ml/day):  3064-9442; Method Used for Fluid Requirements:  1 ml/kcal      Nutrition Related Findings:  labs reviewed with Mg low 56      Wounds:  None       Current Nutrition Therapies:    ADULT DIET;  Regular  ADULT ORAL NUTRITION SUPPLEMENT; Breakfast, Lunch, Dinner; Standard High Calorie/High Protein Oral Supplement    Anthropometric Measures:  · Height: 4' 11\" (149.9 cm)  · Current Body Weight: 108 lb (49 kg)   · Admission Body Weight: 102 lb (46.3 kg)    · Usual Body Weight: 108 lb (49 kg) (prior encounter stated)     · Ideal Body Weight: 95 lbs; % Ideal Body Weight 113.7 %   · BMI: 21.8  · Adjusted Body Weight:  ; No Adjustment   · BMI Categories: Underweight (BMI less than 22) age over 72       Nutrition Diagnosis:   · In context of acute illness or injury related to acute injury/trauma as evidenced by other (comment)      Nutrition Interventions:   Food and/or Nutrient Delivery:  Start Oral Nutrition Supplement,Continue Current Diet  Nutrition Education/Counseling:  No recommendation at this time   Coordination of Nutrition Care:  Continue to monitor while inpatient    Goals:  Oral intakes will be consistently greater than 51%       Nutrition Monitoring and Evaluation:   Behavioral-Environmental Outcomes:  None Identified   Food/Nutrient Intake Outcomes:  Food and Nutrient Intake,Supplement Intake  Physical Signs/Symptoms Outcomes:  Biochemical Data,Chewing or Swallowing,Meal Time Behavior,Skin,Weight     Discharge Planning:    Continue Oral Nutrition Supplement     Electronically signed by Elizabeth Rhoades RD, LD on 3/7/22 at 4:56 PM EST    Contact: [phone]

## 2022-03-07 NOTE — PROGRESS NOTES
Hospitalist Progress Note      Name:  Bea Treadwell /Age/Sex: 3/26/1931  (80 y.o. female)   MRN & CSN:  [id number] & [id number] Admission Date/Time: 3/4/2022 12:17 PM   Location:   PCP: Kimberly Toussaint 58 Day: 4    Assessment and Plan:   Bea Treadwell is a 80 y.o.  female  who presents with Physical debility      1. Physical debility: Continue to work with PT OT to determine final disability and is in bed program  2. Acute encephalopathy: Resolved prior to discharge to swing bed program  3. Urinary retention: Resolved prior to discharge to swing bed program  4. Mild dementia: We will continue monitoring by PCP  5. Hypothyroidism: Continue Synthroid  6. Hypertension: Continue home medication  7. Arthritis: Home medications resumed  8. NARINDER on CKD: Resolved prior to discharge to swing bed program  9. GERD: Continue home Protonix  10. Recently diagnosed influenza: Continue Tamiflu will complete a total of 5 days  11. Right ovarian cystic neoplasm: Follows with OB/GYN will need to continue to follow upon discharge      Diet ADULT DIET; Regular   DVT Prophylaxis [] Lovenox, [x]  Heparin, [] SCDs, [] Ambulation   GI Prophylaxis [x] PPI,  [] H2 Blocker,  [] Carafate,  [] Diet/Tube Feeds   Code Status DNR-CCA             History of Present Illness:     Chief Complaint:      Patient seen and examined today, she has no complaints of any shortness of breath no chest pain no nausea no vomiting no diarrhea no abdominal pain. She is working well with physical and Occupational Therapy. Continue current treatment. Ten point ROS reviewed negative, unless as noted above    Objective:        Intake/Output Summary (Last 24 hours) at 3/7/2022 0942  Last data filed at 3/7/2022 0902  Gross per 24 hour   Intake 600 ml   Output --   Net 600 ml      Vitals:   Vitals:    22 0750   BP:    Pulse:    Resp:    Temp:    SpO2: 94%     Physical Exam:   GEN Awake female, sitting upright in bed in no apparent distress. Appears given age. Very hard of hearing  EYES Pupils are equally round. No scleral erythema, discharge, or conjunctivitis. HENT Mucous membranes are moist. Oral pharynx without exudates, no evidence of thrush. NECK Supple, no apparent thyromegaly or masses. RESP Clear to auscultation, no wheezes, rales or rhonchi. Symmetric chest movement while on room air. CARDIO/VASC S1/S2 auscultated. Regular rate without appreciable murmurs, rubs, or gallops. No JVD or carotid bruits. Peripheral pulses equal bilaterally and palpable. No peripheral edema. GI Abdomen is soft without significant tenderness, masses, or guarding. Bowel sounds are normoactive. Rectal exam deferred.  No costovertebral angle tenderness. MSK No gross joint deformities. SKIN Normal coloration, warm, dry. NEURO Cranial nerves appear grossly intact, normal speech, no lateralizing weakness. PSYCH Awake, alert, oriented x 4. Affect appropriate.     Medications:   Medications:    heparin (porcine)  5,000 Units SubCUTAneous BID    ipratropium-albuterol  1 ampule Inhalation Q4H WA    levothyroxine  75 mcg Oral Daily    metoprolol succinate  12.5 mg Oral Daily    pantoprazole  40 mg Oral QAM AC      Infusions:   PRN Meds: acetaminophen, 650 mg, Q6H PRN   Or  acetaminophen, 650 mg, Q6H PRN  polyethylene glycol, 17 g, Daily PRN              Electronically signed by ALEJANDRO Mcdaniel NP on 3/7/2022 at 9:42 AM

## 2022-03-07 NOTE — PROGRESS NOTES
SWING BED WEEKLY TEAM SHEET     WEEK#     1  NUTRITION   Diet:   regular                TF:       no   TPN:   no  Appropriate/Adequate [X] yes [ ] no  With supplements Meal intakes: 1-75%    Weight:  108#  BMI:     21.8       Significant Change: no

## 2022-03-07 NOTE — PROGRESS NOTES
Pt seen by therapist from [phone] to complete pt's leisure assessment for the swing bed program.  Pt hard of hearing and struggled to follow along with questions. Pt reports preferring to be active in the morning and to do activities with others.   Pt expressed enjoyment in:  [employer]  Current Events  Shopping  Television  Movies  Word Searches  Music  Reading  Seth  Cooking/Baking    Electronically signed by Chey Key [address] [place] [address] on 3/7/2022 at 1:49 PM

## 2022-03-07 NOTE — PROGRESS NOTES
Physical Therapy    Physical Therapy Treatment Note  Name: Olivia Welch MRN: [id number] :   3/26/1931   Date:  3/7/2022   Admission Date: 3/4/2022 Room:  [address] [place] [address]   Restrictions/Precautions:  Restrictions/Precautions  Restrictions/Precautions: Fall Risk,Contact Precautions  Required Braces or Orthoses?: No       Communication with other providers:  Co-tx with Zaina Pablo     Subjective:  Patient states:  Agreeable to tx session;  Pain:   Location, Type, Intensity (0/10 to 10/10):  No C/O    Objective:    Observation:  Pt in chair upon arrival.    Treatment, including education/measures:  Transfers:  Scooting :CGA  Sit to stand :CGA  Stand to sit :CGA    Gait:  Pt amb with RW for 75 ft and 75 ft with CGA but min/mod for occasional RW correction. Pt amb with decreased marcia and step length bilat. During amb pt tended to drift towards R side of hallway; able to correct right before running into wall/objects  Pt needed VC's for pathway, safety    Exercises:  Ring toss: pt completes dynamic standing balance activity with single UE support on walker and CGA. Tolerates sanding position ~5min before seated rest breaks. Stairs:  Pt amb up and down 2 steps with RW and CG assist using step to pattern    Safety  Patient left safely in the chair, with call light/phone in reach with alarm applied. Gait belt and mask were used for transfers and gait.     Assessment / Impression:       Patient's tolerance of treatment:  good   Adverse Reaction: none  Significant change in status and impact:  none  Barriers to improvement:  Strength, endurance, cognition     Plan for Next Session:    Will cont to work towards pt's goals per her tolerance    Time in:  1020  Time out:  1050  Timed treatment minutes:  30  Total treatment time:  30    Previously filed items:  Social/Functional History  Lives With: Family  Type of Home: House  Home Layout: Performs ADL's on one level  Home Access: Stairs to enter with rails  Entrance Stairs - Number of Steps: 3 elpidio,has a rail  Entrance Stairs - Rails: Both  Bathroom Toilet: Standard  Home Equipment: Rolling walker  Receives Help From: Family  ADL Assistance: Needs assistance  Bath: Minimal assistance  Dressing: Minimal assistance  Grooming: Stand by assistance  Feeding: Independent  Toileting: Independent  Homemaking Assistance: Needs assistance  Homemaking Responsibilities: No  Ambulation Assistance:  (uses RW)  Transfer Assistance: Independent  Active : No  Mode of Transportation: Family  Occupation: Retired  Type of occupation: was a   Leisure & Hobbies: TV  Additional Comments: Pt is very hard of hearing;  She states she has been independent with adls; her helper had told  she needs minimal assist for adls  Short term goals  Short term goal 1: 1 week  Short term goal 2: patient will complete all bed mobility activity with mod I  Short term goal 3: patient will safely perform all transfers with SBA  Short term goal 5: patient will safely ascend/descend  a 6'\" step in // bars       Electronically signed by:    Anirudh Bhardwaj PTA  3/7/2022, 11:24 AM

## 2022-03-08 VITALS
TEMPERATURE: 97.3 F | WEIGHT: 108 LBS | HEIGHT: 59 IN | DIASTOLIC BLOOD PRESSURE: 81 MMHG | RESPIRATION RATE: 20 BRPM | HEART RATE: 83 BPM | OXYGEN SATURATION: 99 % | SYSTOLIC BLOOD PRESSURE: 188 MMHG | BODY MASS INDEX: 21.77 KG/M2

## 2022-03-08 PROCEDURE — 94640 AIRWAY INHALATION TREATMENT: CPT

## 2022-03-08 PROCEDURE — 97110 THERAPEUTIC EXERCISES: CPT

## 2022-03-08 PROCEDURE — 6370000000 HC RX 637 (ALT 250 FOR IP): Performed by: NURSE PRACTITIONER

## 2022-03-08 PROCEDURE — 6360000002 HC RX W HCPCS: Performed by: NURSE PRACTITIONER

## 2022-03-08 PROCEDURE — 97116 GAIT TRAINING THERAPY: CPT

## 2022-03-08 RX ORDER — OSELTAMIVIR PHOSPHATE 75 MG/1
75 CAPSULE ORAL DAILY
Qty: 2 CAPSULE | Refills: 0 | Status: SHIPPED | OUTPATIENT
Start: 2022-03-08 | End: 2022-03-10

## 2022-03-08 RX ORDER — LEVOTHYROXINE SODIUM 0.07 MG/1
75 TABLET ORAL DAILY
Qty: 30 TABLET | Refills: 3 | Status: SHIPPED | OUTPATIENT
Start: 2022-03-09

## 2022-03-08 RX ORDER — IPRATROPIUM BROMIDE AND ALBUTEROL SULFATE 2.5; .5 MG/3ML; MG/3ML
1 SOLUTION RESPIRATORY (INHALATION) EVERY 4 HOURS PRN
Qty: 360 ML | Refills: 0 | Status: SHIPPED | OUTPATIENT
Start: 2022-03-08 | End: 2022-03-28

## 2022-03-08 RX ADMIN — LEVOTHYROXINE SODIUM 75 MCG: 0.07 TABLET ORAL at 07:00

## 2022-03-08 RX ADMIN — IPRATROPIUM BROMIDE AND ALBUTEROL SULFATE 1 AMPULE: 2.5; .5 SOLUTION RESPIRATORY (INHALATION) at 10:47

## 2022-03-08 RX ADMIN — PANTOPRAZOLE SODIUM 40 MG: 40 TABLET, DELAYED RELEASE ORAL at 07:00

## 2022-03-08 RX ADMIN — METOPROLOL SUCCINATE 12.5 MG: 25 TABLET, EXTENDED RELEASE ORAL at 08:10

## 2022-03-08 RX ADMIN — HEPARIN SODIUM 5000 UNITS: 5000 INJECTION INTRAVENOUS; SUBCUTANEOUS at 08:10

## 2022-03-08 RX ADMIN — IPRATROPIUM BROMIDE AND ALBUTEROL SULFATE 1 AMPULE: 2.5; .5 SOLUTION RESPIRATORY (INHALATION) at 07:30

## 2022-03-08 ASSESSMENT — PAIN SCALES - GENERAL: PAINLEVEL_OUTOF10: 0

## 2022-03-08 NOTE — PROGRESS NOTES
Physical Therapy    Physical Therapy Treatment Note  Name: Shivani Fisher MRN: [id number] :   3/26/1931   Date:  3/8/2022   Admission Date: 3/4/2022 Room:  [address] [place] [address]   Restrictions/Precautions:  Restrictions/Precautions  Restrictions/Precautions: Fall Risk,Contact Precautions  Required Braces or Orthoses?: No       Communication with other providers:   Okay to see per nursing     Subjective:  Patient states:  Agreeable to tx session;  Pain:   Location, Type, Intensity (0/10 to 10/10):  No C/O    Objective:    Observation:  Pt in bed upon arrival.    Treatment, including education/measures:  Transfers:  Scooting :SBA  Sit to stand :SBA  Stand to sit :SBA  Toilet transfer: SBA   Indep w/oliver care  Gait:  Pt amb with RW 25 ft with CGA. Exercises:  Supine: Heel slides B 2x10   Hip AB/AD B 2x10  Ankle pumps 20x  Seated: Semi reclined  SLR 2x10  Pillow squeeze 2x10    Stairs:     Safety  Patient left safely in the chair, with call light/phone in reach with alarm applied. Gait belt and mask were used for transfers and gait.     Assessment / Impression:       Patient's tolerance of treatment:  good   Adverse Reaction: none  Significant change in status and impact:  none  Barriers to improvement:  Strength, endurance, cognition     Plan for Next Session:    Will cont to work towards pt's goals per her tolerance    Time in:  928  Time out: 958  Timed treatment minutes:  30  Total treatment time:  30    Previously filed items:  Social/Functional History  Lives With: Family  Type of Home: House  Home Layout: Performs ADL's on one level  Home Access: Stairs to enter with rails  Entrance Stairs - Number of Steps: 3 elpidio,has a rail  Entrance Stairs - Rails: Both  Bathroom Toilet: Standard  Home Equipment: Rolling walker  Receives Help From: Family  ADL Assistance: Needs assistance  Bath: Minimal assistance  Dressing: Minimal assistance  Grooming: Stand by assistance  Feeding: Independent  Toileting: Independent  Homemaking Assistance: Needs assistance  Homemaking Responsibilities: No  Ambulation Assistance:  (uses RW)  Transfer Assistance: Independent  Active : No  Mode of Transportation: Family  Occupation: Retired  Type of occupation: was a   Leisure & Hobbies: TV  Additional Comments: Pt is very hard of hearing;  She states she has been independent with adls; her helper had told  she needs minimal assist for adls  Short term goals  Short term goal 1: 1 week  Short term goal 2: patient will complete all bed mobility activity with mod I  Short term goal 3: patient will safely perform all transfers with SBA  Short term goal 5: patient will safely ascend/descend  a 6'\" step in // bars       Electronically signed by:    Isael Salazar PTA  3/8/2022, 11:15 AM

## 2022-03-08 NOTE — PROGRESS NOTES
Occupational Therapy    Occupational Therapy Treatment Note  Name: Vicky Lord MRN: [id number] :   3/26/1931   Date:  3/8/2022   Admission Date: 3/4/2022 Room:  [address] [place] [address]   Restrictions/Precautions:  Restrictions/Precautions  Restrictions/Precautions: Fall Risk,Contact Precautions  Required Braces or Orthoses?: No     Communication with other providers:   Cleared for treatment by RN. Subjective:  Patient states:  \"I need to get home to plant my garden\"  Pain:   Location, Type, Intensity (0/10 to 10/10): No pain    Objective:    Observation:  Pt alert and oriented     Treatment, including education:    Therapeutic Exercise:  Cues were given for technique, safety, recruitment, and rationale. Cues were verbal and/or tactile. Pt completed UE exercises to increase strength and ROM to facilitate increase for ADLs and functional mobility. Pt completed B UEs with 1# dumbbell exercises with curls, shoulder presses, punch, stirring and wrist curls x 20 reps with mod rest breaks due to fatigue. Pt completed red theraband pulls horizontally, punches and upwards x 20 reps with mod rest breaks. Safety Measures:  Left in chair, Pull/Bed Alarm activated and call light left in reach        Assessment / Impression:        Patient's tolerance of treatment: Good   Adverse Reaction: None  Significant change in status and impact:  None  Barriers to improvement:  Dec strength and endurance    Plan for Next Session:    Continue with POC.     Time in:  1125  Time out: 1148  Total treatment time:  23  Billed Units: 2 TE  Electronically signed by:    Kelly Aguila, [address]    3/8/2022, 2:05 PM    Previously filed values:     Short term goals  Time Frame for Short term goals: until discharge  Short term goal 1: Patient will be MI for functional transfers to chair, toilet, or bed without LOB or falls using AE PRN  Short term goal 2: Pt will be MI for toileting and grooming at the sink  Short term goal 3: Patient will be S for UB/LB bathing and dressing using AE PRN  Short term goal 4: Pt will be min vc for BUE and general strengthening  to improve endurance and strength for transfers

## 2022-03-08 NOTE — PROGRESS NOTES
Discharge instructions reviewed with pt and niece. Pt and niece state understanding. Pt's niece packed all belongings and pt was taken out via w/c for dc.

## 2022-03-08 NOTE — PLAN OF CARE
Problem: Falls - Risk of:  Goal: Will remain free from falls  Description: Will remain free from falls  3/8/2022 0904 by Mayra Valle RN  Outcome: Ongoing  3/7/2022 2357 by Maddie Jennings RN  Outcome: Ongoing  Goal: Absence of physical injury  Description: Absence of physical injury  3/8/2022 0904 by Mayra Valle RN  Outcome: Ongoing  3/7/2022 2357 by Maddie Jennings RN  Outcome: Ongoing     Problem: Infection:  Goal: Will remain free from infection  Description: Will remain free from infection  3/8/2022 0904 by Mayra Valle RN  Outcome: Ongoing  3/7/2022 2357 by Maddie Jennings RN  Outcome: Ongoing     Problem: Safety:  Goal: Free from accidental physical injury  Description: Free from accidental physical injury  3/8/2022 0904 by Mayra Valle RN  Outcome: Ongoing  3/7/2022 2357 by Maddie Jennings RN  Outcome: Ongoing  Goal: Free from intentional harm  Description: Free from intentional harm  3/8/2022 0904 by Mayra Valle RN  Outcome: Ongoing  3/7/2022 2357 by Maddie Jennings RN  Outcome: Ongoing     Problem: Daily Care:  Goal: Daily care needs are met  Description: Daily care needs are met  3/8/2022 0904 by Mayra Valle RN  Outcome: Ongoing  3/7/2022 2357 by Maddie Jennings RN  Outcome: Ongoing     Problem: Pain:  Goal: Patient's pain/discomfort is manageable  Description: Patient's pain/discomfort is manageable  3/8/2022 0904 by Mayra Valle RN  Outcome: Ongoing  3/7/2022 2357 by Maddie Jennings RN  Outcome: Ongoing     Problem: Skin Integrity:  Goal: Skin integrity will stabilize  Description: Skin integrity will stabilize  3/8/2022 0904 by Mayra Valle RN  Outcome: Ongoing  3/7/2022 2357 by Maddie Jennings RN  Outcome: Ongoing     Problem: Discharge Planning:  Goal: Patients continuum of care needs are met  Description: Patients continuum of care needs are met  3/8/2022 0904 by Mayra Valle RN  Outcome: Ongoing  3/7/2022 2357 by Maddie Jennings RN  Outcome: Ongoing     Problem: Skin Integrity:  Goal: Will show no infection signs and symptoms  Description: Will show no infection signs and symptoms  3/8/2022 0904 by Celeste South RN  Outcome: Ongoing  3/7/2022 2357 by Josef England RN  Outcome: Ongoing

## 2022-03-08 NOTE — CARE COORDINATION
SVEN submitted updated clinical documentation to [facility] to request an extension to the patient's stay in the swing bed program.  CM will follow. 1:40 PM  SVEN received a call from the patient's niece Miroslava Guillen stating that she would like to get the patient home soon. Lorna Alvarenga stated that the patient has improved a great deal and is getting anxious to return home. Vendal fears that the patient's behavior could worsen if she does not return home soon. Lorna Alvarenga is in agreement with the patient being discharged today. SVEN notified the hospitalist via [employer].

## 2022-03-08 NOTE — PROGRESS NOTES
Pt unable to participate in pet therapy d/t isolation precautions.      Electronically signed by NAHID Crocker MA on 3/8/2022 at 12:44 PM

## 2022-03-12 NOTE — DISCHARGE SUMMARY
V2.0  Discharge Summary    Name:  Joseph Vogel /Age/Sex: 3/26/1931 (80 y.o. female)   Admit Date: 3/4/2022  Discharge Date: 3/12/22    MRN & CSN:  [id number] & [id number] Encounter Date and Time 3/12/22 8:30 AM EST    Attending:  No att. providers found Discharging Provider: Mary Jaffe MD       Hospital Course:     Brief HPI: Joseph Vogel is a 80 y.o. female who presented with physical debility. She was admitted to a swing bed for rehab purposes. Brief Problem Based Course:     1. Physical debility: Per niece, the patient has improved a lot and has decided to take her home to avoid hospital induced delirium  2. Acute encephalopathy: Resolved prior to discharge to swing bed program  3. Urinary retention: Resolved prior to discharge to swing bed program  4. Mild dementia: Stable, outpatient followup. Niece is asking to take her home since she is at high risk of developing delirium while in the hospital.  5. Hypothyroidism: Continue Synthroid  6. Hypertension: Continue home medication  7. Arthritis: Home medications resumed  8. NARINDER on CKD: Resolved prior to discharge to swing bed program  9. GERD: Continue home Protonix  10. Recently diagnosed influenza: Needs two more days of Oseltamivir. 11. Right ovarian cystic neoplasm: Follows with OB/GYN will need to continue to follow upon discharge        The patient expressed appropriate understanding of, and agreement with the discharge recommendations, medications, and plan.      Consults this admission:  None    Discharge Diagnosis:   Physical debility        Discharge Instruction:   Follow up appointments: Primary care  Primary care physician: Tonia De La Cruz DO within 2 weeks  Diet: regular diet   Activity: activity as tolerated  Disposition: Discharged to:   [x]Home, []C, []SNF, []Acute Rehab, []Hospice   Condition on discharge: Stable  Labs and Tests to be Followed up as an outpatient by PCP or Specialist: None    Discharge Medications: Medication List      START taking these medications    ipratropium-albuterol 0.5-2.5 (3) MG/3ML Soln nebulizer solution  Commonly known as: DUONEB  Inhale 3 mLs into the lungs every 4 hours as needed for Shortness of Breath (While awake (4 doses max per day))        CHANGE how you take these medications    levothyroxine 75 MCG tablet  Commonly known as: SYNTHROID  Take 1 tablet by mouth Daily  What changed:   · medication strength  · how much to take        CONTINUE taking these medications    metoprolol succinate 25 MG extended release tablet  Commonly known as: TOPROL XL     omeprazole 20 MG delayed release capsule  Commonly known as: PRILOSEC        ASK your doctor about these medications    oseltamivir 75 MG capsule  Commonly known as: Tamiflu  Take 1 capsule by mouth daily for 2 days  Ask about: Should I take this medication? Where to Get Your Medications      These medications were sent to [facility]  [address] [facility] [address], [address] [place] [address]    Phone: [phone]   · ipratropium-albuterol 0.5-2.5 (3) MG/3ML Soln nebulizer solution  · levothyroxine 75 MCG tablet  · oseltamivir 75 MG capsule        Objective Findings at Discharge:   BP (!) 188/81   Pulse 83   Temp 97.3 °F (36.3 °C)   Resp 20   Ht 4' 11\" (1.499 m)   Wt 108 lb (49 kg)   SpO2 99%   BMI 21.81 kg/m²       Physical Exam:   Physical Exam  Vitals and nursing note reviewed. Constitutional:       General: She is not in acute distress. Appearance: Normal appearance. She is normal weight. She is not ill-appearing, toxic-appearing or diaphoretic. HENT:      Head: Normocephalic and atraumatic. Mouth/Throat:      Mouth: Mucous membranes are moist.      Pharynx: Oropharynx is clear. No oropharyngeal exudate. Eyes:      General: No scleral icterus. Conjunctiva/sclera: Conjunctivae normal.   Cardiovascular:      Rate and Rhythm: Normal rate and regular rhythm. Pulses: Normal pulses. Heart sounds: Normal heart sounds. No murmur heard. Pulmonary:      Effort: Pulmonary effort is normal. No respiratory distress. Breath sounds: Normal breath sounds. No wheezing, rhonchi or rales. Abdominal:      General: Abdomen is flat. Bowel sounds are normal. There is no distension. Palpations: Abdomen is soft. Tenderness: There is no abdominal tenderness. There is no guarding or rebound. Musculoskeletal:         General: No tenderness, deformity or signs of injury. Normal range of motion. Cervical back: Normal range of motion and neck supple. No rigidity or tenderness. Right lower leg: No edema. Left lower leg: No edema. Skin:     General: Skin is warm and dry. Findings: No bruising or erythema. Neurological:      General: No focal deficit present. Mental Status: She is alert. Mental status is at baseline. Cranial Nerves: No cranial nerve deficit. Motor: No weakness. Psychiatric:         Mood and Affect: Mood normal.         Behavior: Behavior normal.         Thought Content: Thought content normal.               Labs and Imaging   No results found. CBC: No results for input(s): WBC, HGB, PLT in the last 72 hours. BMP:  No results for input(s): NA, K, CL, CO2, BUN, CREATININE, GLUCOSE in the last 72 hours. Hepatic: No results for input(s): AST, ALT, ALB, BILITOT, ALKPHOS in the last 72 hours. Lipids: No results found for: CHOL, HDL, TRIG  Hemoglobin A1C: No results found for: LABA1C  TSH: No results found for: TSH  Troponin:   Lab Results   Component Value Date    TROPONINT <0.010 03/01/2022    TROPONINT <0.010 01/12/2022    TROPONINT <0.010 05/04/2015     Lactic Acid: No results for input(s): LACTA in the last 72 hours. BNP: No results for input(s): PROBNP in the last 72 hours.   UA:  Lab Results   Component Value Date    NITRU NEGATIVE 03/02/2022    COLORU YELLOW 03/02/2022    WBCUA 0 TO 2/HPF 03/02/2022 RBCUA 0 TO 2/HPF 03/02/2022    MUCUS 2+ 01/12/2022    BACTERIA NEGATIVE 03/02/2022    CLARITYU CLEAR 03/02/2022    SPECGRAV >1.030 03/02/2022    LEUKOCYTESUR NEGATIVE 03/02/2022    UROBILINOGEN 0.2 03/02/2022    BILIRUBINUR NEGATIVE 03/02/2022    BLOODU SMALL 03/02/2022    KETUA TRACE 03/02/2022     Urine Cultures: No results found for: LABURIN  Blood Cultures: No results found for: BC  No results found for: BLOODCULT2  Organism: No results found for: ORG    Time Spent Discharging patient 45 minutes    Electronically signed by Duyen Gonzalez MD on 3/12/2022 at 8:30 AM

## 2022-09-01 ENCOUNTER — APPOINTMENT (OUTPATIENT)
Dept: CT IMAGING | Age: 87
End: 2022-09-01
Payer: MEDICARE

## 2022-09-01 ENCOUNTER — HOSPITAL ENCOUNTER (EMERGENCY)
Age: 87
Discharge: HOME | End: 2022-09-02
Attending: EMERGENCY MEDICINE
Payer: MEDICARE

## 2022-09-01 ENCOUNTER — APPOINTMENT (OUTPATIENT)
Dept: GENERAL RADIOLOGY | Age: 87
End: 2022-09-01
Payer: MEDICARE

## 2022-09-01 DIAGNOSIS — N39.0 URINARY TRACT INFECTION WITHOUT HEMATURIA, SITE UNSPECIFIED: ICD-10-CM

## 2022-09-01 DIAGNOSIS — R41.82 ALTERED MENTAL STATUS, UNSPECIFIED ALTERED MENTAL STATUS TYPE: Primary | ICD-10-CM

## 2022-09-01 LAB
ALBUMIN SERPL-MCNC: 3.9 GM/DL (ref 3.4–5)
ALP BLD-CCNC: 92 IU/L (ref 40–129)
ALT SERPL-CCNC: 13 U/L (ref 10–40)
ANION GAP SERPL CALCULATED.3IONS-SCNC: 12 MMOL/L (ref 4–16)
AST SERPL-CCNC: 33 IU/L (ref 15–37)
BASE EXCESS: 1.7 (ref 0–2)
BASOPHILS ABSOLUTE: 0 K/CU MM
BASOPHILS RELATIVE PERCENT: 0.4 % (ref 0–1)
BILIRUB SERPL-MCNC: 0.7 MG/DL (ref 0–1)
BUN BLDV-MCNC: 23 MG/DL (ref 6–23)
CALCIUM SERPL-MCNC: 8.9 MG/DL (ref 8.3–10.6)
CHLORIDE BLD-SCNC: 108 MMOL/L (ref 99–110)
CO2: 21 MMOL/L (ref 21–32)
CREAT SERPL-MCNC: 1.2 MG/DL (ref 0.6–1.1)
DIFFERENTIAL TYPE: ABNORMAL
EOSINOPHILS ABSOLUTE: 0 K/CU MM
EOSINOPHILS RELATIVE PERCENT: 0 % (ref 0–3)
GFR AFRICAN AMERICAN: 51 ML/MIN/1.73M2
GFR NON-AFRICAN AMERICAN: 42 ML/MIN/1.73M2
GLUCOSE BLD-MCNC: 137 MG/DL (ref 70–99)
HCO3 VENOUS: 21.3 MMOL/L (ref 19–25)
HCT VFR BLD CALC: 43 % (ref 37–47)
HEMOGLOBIN: 14.5 GM/DL (ref 12.5–16)
IMMATURE NEUTROPHIL %: 0.3 % (ref 0–0.43)
LYMPHOCYTES ABSOLUTE: 0.8 K/CU MM
LYMPHOCYTES RELATIVE PERCENT: 8.8 % (ref 24–44)
MCH RBC QN AUTO: 29.8 PG (ref 27–31)
MCHC RBC AUTO-ENTMCNC: 33.7 % (ref 32–36)
MCV RBC AUTO: 88.3 FL (ref 78–100)
MONOCYTES ABSOLUTE: 0.6 K/CU MM
MONOCYTES RELATIVE PERCENT: 6.1 % (ref 0–4)
O2 SAT, VEN: 90.3 % (ref 50–70)
PCO2, VEN: 31.1 MMHG (ref 38–52)
PDW BLD-RTO: 14.2 % (ref 11.7–14.9)
PH VENOUS: 7.45 (ref 7.32–7.42)
PLATELET # BLD: 91 K/CU MM (ref 140–440)
PMV BLD AUTO: 10.9 FL (ref 7.5–11.1)
PO2, VEN: 55.8 MMHG (ref 28–48)
POTASSIUM SERPL-SCNC: 4.6 MMOL/L (ref 3.5–5.1)
RBC # BLD: 4.87 M/CU MM (ref 4.2–5.4)
SEGMENTED NEUTROPHILS ABSOLUTE COUNT: 8 K/CU MM
SEGMENTED NEUTROPHILS RELATIVE PERCENT: 84.4 % (ref 36–66)
SODIUM BLD-SCNC: 141 MMOL/L (ref 135–145)
TOTAL IMMATURE NEUTOROPHIL: 0.03 K/CU MM
TOTAL PROTEIN: 7 GM/DL (ref 6.4–8.2)
WBC # BLD: 9.5 K/CU MM (ref 4–10.5)

## 2022-09-01 PROCEDURE — 87077 CULTURE AEROBIC IDENTIFY: CPT

## 2022-09-01 PROCEDURE — 80053 COMPREHEN METABOLIC PANEL: CPT

## 2022-09-01 PROCEDURE — 82805 BLOOD GASES W/O2 SATURATION: CPT

## 2022-09-01 PROCEDURE — 85025 COMPLETE CBC W/AUTO DIFF WBC: CPT

## 2022-09-01 PROCEDURE — 87186 SC STD MICRODIL/AGAR DIL: CPT

## 2022-09-01 PROCEDURE — 93005 ELECTROCARDIOGRAM TRACING: CPT | Performed by: EMERGENCY MEDICINE

## 2022-09-01 PROCEDURE — 96365 THER/PROPH/DIAG IV INF INIT: CPT

## 2022-09-01 PROCEDURE — 84484 ASSAY OF TROPONIN QUANT: CPT

## 2022-09-01 PROCEDURE — 81001 URINALYSIS AUTO W/SCOPE: CPT

## 2022-09-01 PROCEDURE — 99285 EMERGENCY DEPT VISIT HI MDM: CPT

## 2022-09-01 PROCEDURE — 70450 CT HEAD/BRAIN W/O DYE: CPT

## 2022-09-01 PROCEDURE — 71045 X-RAY EXAM CHEST 1 VIEW: CPT

## 2022-09-01 PROCEDURE — 87086 URINE CULTURE/COLONY COUNT: CPT

## 2022-09-01 ASSESSMENT — PAIN - FUNCTIONAL ASSESSMENT: PAIN_FUNCTIONAL_ASSESSMENT: NONE - DENIES PAIN

## 2022-09-02 VITALS
DIASTOLIC BLOOD PRESSURE: 70 MMHG | TEMPERATURE: 99.8 F | SYSTOLIC BLOOD PRESSURE: 176 MMHG | OXYGEN SATURATION: 97 % | WEIGHT: 108 LBS | BODY MASS INDEX: 21.81 KG/M2 | RESPIRATION RATE: 16 BRPM | HEART RATE: 95 BPM

## 2022-09-02 LAB
BACTERIA: ABNORMAL /HPF
BILIRUBIN URINE: NEGATIVE MG/DL
BLOOD, URINE: NEGATIVE
CAST TYPE: ABNORMAL /HPF
CLARITY: ABNORMAL
COLOR: YELLOW
CRYSTAL TYPE: NEGATIVE /HPF
EKG ATRIAL RATE: 107 BPM
EKG DIAGNOSIS: NORMAL
EKG P AXIS: 25 DEGREES
EKG P-R INTERVAL: 180 MS
EKG Q-T INTERVAL: 350 MS
EKG QRS DURATION: 104 MS
EKG QTC CALCULATION (BAZETT): 467 MS
EKG R AXIS: -46 DEGREES
EKG T AXIS: 61 DEGREES
EKG VENTRICULAR RATE: 107 BPM
EPITHELIAL CELLS, UA: 2 /HPF
GLUCOSE, URINE: NEGATIVE MG/DL
KETONES, URINE: ABNORMAL MG/DL
LACTIC ACID, SEPSIS: 1.5 MMOL/L (ref 0.5–1.9)
LEUKOCYTE ESTERASE, URINE: ABNORMAL
NITRITE URINE, QUANTITATIVE: POSITIVE
PH, URINE: 7.5 (ref 5–8)
PROTEIN UA: 30 MG/DL
RBC URINE: 2 /HPF (ref 0–6)
SPECIFIC GRAVITY UA: 1.01 (ref 1–1.03)
TROPONIN T: <0.01 NG/ML
UROBILINOGEN, URINE: 0.2 MG/DL (ref 0.2–1)
WBC UA: 15 /HPF (ref 0–5)

## 2022-09-02 PROCEDURE — 2580000003 HC RX 258: Performed by: EMERGENCY MEDICINE

## 2022-09-02 PROCEDURE — 83605 ASSAY OF LACTIC ACID: CPT

## 2022-09-02 PROCEDURE — 93010 ELECTROCARDIOGRAM REPORT: CPT | Performed by: INTERNAL MEDICINE

## 2022-09-02 PROCEDURE — 87040 BLOOD CULTURE FOR BACTERIA: CPT

## 2022-09-02 PROCEDURE — 6360000002 HC RX W HCPCS: Performed by: EMERGENCY MEDICINE

## 2022-09-02 PROCEDURE — 96365 THER/PROPH/DIAG IV INF INIT: CPT

## 2022-09-02 RX ORDER — CEPHALEXIN 500 MG/1
500 CAPSULE ORAL 2 TIMES DAILY
Qty: 20 CAPSULE | Refills: 0 | Status: SHIPPED | OUTPATIENT
Start: 2022-09-02 | End: 2022-09-12

## 2022-09-02 RX ORDER — 0.9 % SODIUM CHLORIDE 0.9 %
500 INTRAVENOUS SOLUTION INTRAVENOUS ONCE
Status: COMPLETED | OUTPATIENT
Start: 2022-09-02 | End: 2022-09-02

## 2022-09-02 RX ADMIN — SODIUM CHLORIDE 500 ML: 9 INJECTION, SOLUTION INTRAVENOUS at 00:38

## 2022-09-02 RX ADMIN — CEFTRIAXONE SODIUM 1000 MG: 1 INJECTION, POWDER, FOR SOLUTION INTRAMUSCULAR; INTRAVENOUS at 00:51

## 2022-09-02 ASSESSMENT — PAIN - FUNCTIONAL ASSESSMENT: PAIN_FUNCTIONAL_ASSESSMENT: NONE - DENIES PAIN

## 2022-09-02 NOTE — DISCHARGE INSTRUCTIONS
Your urine test today showed infection. You are being prescribed antibiotics. Please follow-up with your primary care physician for monitoring the resolution of your symptoms. If you develop any worsening concerning symptoms, please seek immediate medical evaluation.

## 2022-09-02 NOTE — ED PROVIDER NOTES
[id number]      Pt Name: Kirstin Chris  MRN: [id number]  [place] 3/26/1931  Date of evaluation: 9/1/2022  Provider: Jennifer Llamas MD    CHIEF COMPLAINT       Chief Complaint   Patient presents with    Altered Mental Status         HISTORY OF PRESENT ILLNESS      Kirstin Chris is a 80 y.o. female who presents to the emergency department  for   Chief Complaint   Patient presents with    Altered Mental Status       68-year-old female presents from home with report of altered mental status. She does have some confusion at baseline. Lives at home with family. They are reporting that she seems more confused than typical.  No report of any fall or injury or other trauma. She is a bit of a difficult historian in the emergency department. She does not report any significant acute complaints at this time. Does not report any difficulty breathing or abdominal pain or chest pain. She is moving extremity spontaneously. No lateralizing neurological symptoms. No signs of respiratory distress. Nursing Notes, Triage Notes & Vital Signs were reviewed. REVIEW OF SYSTEMS    (2-9 systems for level 4, 10 or more for level 5)     Review of Systems   Unable to perform ROS: Mental status change     Except as noted above the remainder of the review of systems was reviewed and negative. PAST MEDICAL HISTORY     Past Medical History:   Diagnosis Date    Arthritis     Thyroid disease        Prior to Admission medications    Medication Sig Start Date End Date Taking?  Authorizing Provider   cephALEXin (KEFLEX) 500 MG capsule Take 1 capsule by mouth 2 times daily for 10 days 9/2/22 9/12/22 Yes Jennifer Llamas MD   levothyroxine (SYNTHROID) 75 MCG tablet Take 1 tablet by mouth Daily 3/9/22   Benay Felty, MD   ipratropium-albuterol (DUONEB) 0.5-2.5 (3) MG/3ML SOLN nebulizer solution Inhale 3 mLs into the lungs every 4 hours as needed for Shortness of Breath (While awake (4 doses max per day)) 3/8/22 3/28/22  Myranda Meeks MD   metoprolol succinate (TOPROL XL) 25 MG extended release tablet Take 12.5 mg by mouth daily     Historical Provider, MD   omeprazole (PRILOSEC) 20 MG capsule Take 20 mg by mouth daily    Historical Provider, MD        Patient Active Problem List   Diagnosis    AMS (altered mental status)    Physical debility         SURGICAL HISTORY       Past Surgical History:   Procedure Laterality Date    APPENDECTOMY      CHOLECYSTECTOMY      EYE SURGERY      FRACTURE SURGERY      HYSTERECTOMY      TONSILLECTOMY           CURRENT MEDICATIONS       Previous Medications    IPRATROPIUM-ALBUTEROL (DUONEB) 0.5-2.5 (3) MG/3ML SOLN NEBULIZER SOLUTION    Inhale 3 mLs into the lungs every 4 hours as needed for Shortness of Breath (While awake (4 doses max per day))    LEVOTHYROXINE (SYNTHROID) 75 MCG TABLET    Take 1 tablet by mouth Daily    METOPROLOL SUCCINATE (TOPROL XL) 25 MG EXTENDED RELEASE TABLET    Take 12.5 mg by mouth daily     OMEPRAZOLE (PRILOSEC) 20 MG CAPSULE    Take 20 mg by mouth daily       ALLERGIES     Patient has no known allergies. FAMILY HISTORY     No family history on file. SOCIAL HISTORY       Social History     Socioeconomic History    Marital status:    Tobacco Use    Smoking status: Never    Smokeless tobacco: Never   Substance and Sexual Activity    Alcohol use: No    Drug use: No    Sexual activity: Never       SCREENINGS    [place] Coma Scale  Eye Opening: Spontaneous  Best Verbal Response: Confused  Best Motor Response: Obeys commands  [place] Coma Scale Score: 14          PHYSICAL EXAM    (up to 7 for level 4, 8 or more for level 5)     ED Triage Vitals [09/01/22 2233]   BP Temp Temp Source Heart Rate Resp SpO2 Height Weight   (!) 143/78 99.8 °F (37.7 °C) Oral (!) 109 16 93 % -- 108 lb (49 kg)       Physical Exam  Vitals reviewed. HENT:      Head: Normocephalic and atraumatic.    Eyes: Extraocular Movements: Extraocular movements intact. Pupils: Pupils are equal, round, and reactive to light. Cardiovascular:      Rate and Rhythm: Tachycardia present. Heart sounds: No friction rub. No gallop. Pulmonary:      Effort: Pulmonary effort is normal.      Breath sounds: No rhonchi. Comments: Diminished breath sounds bilaterally  Abdominal:      General: There is no distension. Palpations: Abdomen is soft. Tenderness: There is no guarding. Musculoskeletal:         General: No swelling or tenderness. Normal range of motion. Cervical back: Normal range of motion. Skin:     General: Skin is warm. Capillary Refill: Capillary refill takes less than 2 seconds. Neurological:      Mental Status: She is alert.       Comments: Moving all extremities spontaneously; answering questions and following commands       DIAGNOSTIC RESULTS     Labs Reviewed   COMPREHENSIVE METABOLIC PANEL - Abnormal; Notable for the following components:       Result Value    Creatinine 1.2 (*)     Glucose 137 (*)     GFR Non- 42 (*)     GFR  51 (*)     All other components within normal limits   CBC WITH AUTO DIFFERENTIAL - Abnormal; Notable for the following components:    Platelets 91 (*)     Segs Relative 84.4 (*)     Lymphocytes % 8.8 (*)     Monocytes % 6.1 (*)     All other components within normal limits   URINALYSIS WITH MICROSCOPIC - Abnormal; Notable for the following components:    Clarity, UA SLIGHTLY CLOUDY (*)     Ketones, Urine TRACE (*)     Protein, UA 30 (*)     Nitrite Urine, Quantitative POSITIVE (*)     Leukocyte Esterase, Urine SMALL NUMBER OR AMOUNT OBSERVED (*)     WBC, UA 15 (*)     Bacteria, UA FEW (*)     All other components within normal limits   BLOOD GAS, VENOUS - Abnormal; Notable for the following components:    pH, Enrique 7.45 (*)     pCO2, Enrique 31.1 (*)     pO2, Enrique 55.8 (*)     O2 Sat, Enrique 90.3 (*)     All other components within normal limits   CULTURE, BLOOD 1   CULTURE, BLOOD 1   CULTURE, URINE   TROPONIN   LACTATE, SEPSIS        RADIOLOGY:     Non-plain film images such as CT, Ultrasound and MRI are read by the radiologist. Plain radiographic images are visualized and preliminarily interpreted by the emergency physician. Interpretation per the Radiologist below, if available at the time of this note:    CT HEAD WO CONTRAST   Final Result   No acute intracranial abnormality. XR CHEST PORTABLE   Final Result   Prominence of the ascending thoracic aorta suspicious for aneurysm. CT   angiogram of the chest is recommended for further evaluation. No acute pulmonary disease.                ED BEDSIDE ULTRASOUND:   Performed by ED Physician Rj De La Vega MD       LABS:  Labs Reviewed   COMPREHENSIVE METABOLIC PANEL - Abnormal; Notable for the following components:       Result Value    Creatinine 1.2 (*)     Glucose 137 (*)     GFR Non- 42 (*)     GFR  51 (*)     All other components within normal limits   CBC WITH AUTO DIFFERENTIAL - Abnormal; Notable for the following components:    Platelets 91 (*)     Segs Relative 84.4 (*)     Lymphocytes % 8.8 (*)     Monocytes % 6.1 (*)     All other components within normal limits   URINALYSIS WITH MICROSCOPIC - Abnormal; Notable for the following components:    Clarity, UA SLIGHTLY CLOUDY (*)     Ketones, Urine TRACE (*)     Protein, UA 30 (*)     Nitrite Urine, Quantitative POSITIVE (*)     Leukocyte Esterase, Urine SMALL NUMBER OR AMOUNT OBSERVED (*)     WBC, UA 15 (*)     Bacteria, UA FEW (*)     All other components within normal limits   BLOOD GAS, VENOUS - Abnormal; Notable for the following components:    pH, Enrique 7.45 (*)     pCO2, Enrique 31.1 (*)     pO2, Enrique 55.8 (*)     O2 Sat, Enrique 90.3 (*)     All other components within normal limits   CULTURE, BLOOD 1   CULTURE, BLOOD 1   CULTURE, URINE   TROPONIN   LACTATE, SEPSIS       All other labs were within normal range or not returned as of this dictation. EMERGENCY DEPARTMENT COURSE and DIFFERENTIAL DIAGNOSIS/MDM:   Vitals:    Vitals:    09/01/22 2233   BP: (!) 143/78   Pulse: (!) 109   Resp: 16   Temp: 99.8 °F (37.7 °C)   TempSrc: Oral   SpO2: 93%   Weight: 108 lb (49 kg)           MDM  Number of Diagnoses or Management Options  Altered mental status, unspecified altered mental status type  Urinary tract infection without hematuria, site unspecified  Diagnosis management comments: 80-year-old female presents from home with reported altered mental status. Family was later available in emergency department for collateral information. They report that she had an episode this evening of being more confused than her typical.  She presents emerged part without any significant complaints. No report that she had any remarkable infectious symptoms like fevers or chills or cough. No report that she had any falls or injuries or trauma. She presents with no lateralizing neurological symptoms or signs. She presents with elevated blood pressure. Chest present with mild tachycardia. Temperature was up febrile at 99.8. Respirations unlabored. Saturations in the low 90s on room air. Labs, chest x-ray and head CT scan are obtained. She is no significant leukocytosis. Her white count is 9.5. CMP shows a serum creatinine of 1.2 which is within her baseline range. Her chest x-ray does not show any signs of infectious process. Does show some prominence of the aorta. CT of her head is overall unremarkable. Her urinalysis is nitrate positive with signs of infection. Results and incidental findings were discussed with family. At this time family does report that she seems \"better. \"  She is given a small fluid bolus in the emergency department. Her heart rate had improved before she was given a fluid bolus. She is treated with ceftriaxone the emergency department.   At this time family continues to feel prevention of possible spread of respiratory viral illnesses. The Patient was instructed to read the package inserts with any medication that was prescribed. Major potential reactions and medication interactions were discussed. The Patient understands that there are numerous possible adverse reactions not covered. The patient was also instructed to arrange follow-up with his or her primary care provider for review of any pending labwork or incidental findings on any radiology results that were obtained. All efforts were made to discuss any incidental findings that require further monitoring. Controlled Substances Monitoring:     No flowsheet data found.     (Please note that portions of this note were completed with a voice recognition program.  Efforts were made to edit the dictations but occasionally words are mis-transcribed.)    Mckinley Zelaya MD (electronically signed)  Attending Emergency Physician            Mckinley Zelaya MD  09/02/22 [id number]

## 2022-09-04 LAB
CULTURE: ABNORMAL
CULTURE: ABNORMAL
Lab: ABNORMAL
SPECIMEN: ABNORMAL

## 2022-09-07 LAB
CULTURE: NORMAL
CULTURE: NORMAL
Lab: NORMAL
Lab: NORMAL
SPECIMEN: NORMAL
SPECIMEN: NORMAL

## 2022-11-16 ENCOUNTER — APPOINTMENT (OUTPATIENT)
Dept: CT IMAGING | Age: 87
End: 2022-11-16
Payer: MEDICARE

## 2022-11-16 ENCOUNTER — APPOINTMENT (OUTPATIENT)
Dept: GENERAL RADIOLOGY | Age: 87
End: 2022-11-16
Payer: MEDICARE

## 2022-11-16 ENCOUNTER — HOSPITAL ENCOUNTER (EMERGENCY)
Age: 87
Discharge: HOME | End: 2022-11-16
Attending: EMERGENCY MEDICINE
Payer: MEDICARE

## 2022-11-16 VITALS
WEIGHT: 105 LBS | RESPIRATION RATE: 22 BRPM | TEMPERATURE: 98.1 F | OXYGEN SATURATION: 97 % | SYSTOLIC BLOOD PRESSURE: 155 MMHG | HEART RATE: 70 BPM | DIASTOLIC BLOOD PRESSURE: 74 MMHG | HEIGHT: 59 IN | BODY MASS INDEX: 21.17 KG/M2

## 2022-11-16 DIAGNOSIS — R41.82 MENTAL STATUS, DECREASED: Primary | ICD-10-CM

## 2022-11-16 DIAGNOSIS — R30.0 DYSURIA: ICD-10-CM

## 2022-11-16 DIAGNOSIS — R53.1 GENERALIZED WEAKNESS: ICD-10-CM

## 2022-11-16 LAB
ALBUMIN SERPL-MCNC: 3.6 GM/DL (ref 3.4–5)
ALP BLD-CCNC: 93 IU/L (ref 40–129)
ALT SERPL-CCNC: 26 U/L (ref 10–40)
ANION GAP SERPL CALCULATED.3IONS-SCNC: 10 MMOL/L (ref 4–16)
AST SERPL-CCNC: 42 IU/L (ref 15–37)
BILIRUB SERPL-MCNC: 0.3 MG/DL (ref 0–1)
BILIRUBIN URINE: NEGATIVE MG/DL
BLOOD, URINE: NEGATIVE
BUN BLDV-MCNC: 22 MG/DL (ref 6–23)
CALCIUM SERPL-MCNC: 8.3 MG/DL (ref 8.3–10.6)
CHLORIDE BLD-SCNC: 108 MMOL/L (ref 99–110)
CLARITY: CLEAR
CO2: 26 MMOL/L (ref 21–32)
COLOR: YELLOW
CREAT SERPL-MCNC: 1.2 MG/DL (ref 0.6–1.1)
DIFFERENTIAL TYPE: ABNORMAL
EKG ATRIAL RATE: 69 BPM
EKG DIAGNOSIS: NORMAL
EKG P AXIS: 37 DEGREES
EKG P-R INTERVAL: 130 MS
EKG Q-T INTERVAL: 390 MS
EKG QRS DURATION: 94 MS
EKG QTC CALCULATION (BAZETT): 417 MS
EKG R AXIS: -49 DEGREES
EKG T AXIS: 121 DEGREES
EKG VENTRICULAR RATE: 69 BPM
EOSINOPHILS ABSOLUTE: 0.1 K/CU MM
EOSINOPHILS RELATIVE PERCENT: 1 % (ref 0–3)
GFR SERPL CREATININE-BSD FRML MDRD: 43 ML/MIN/1.73M2
GLUCOSE BLD-MCNC: 118 MG/DL (ref 70–99)
GLUCOSE, URINE: NEGATIVE MG/DL
HCT VFR BLD CALC: 45.2 % (ref 37–47)
HEMOGLOBIN: 14.7 GM/DL (ref 12.5–16)
KETONES, URINE: NEGATIVE MG/DL
LACTATE: 1.4 MMOL/L (ref 0.4–2)
LACTATE: 2.5 MMOL/L (ref 0.4–2)
LEUKOCYTE ESTERASE, URINE: NEGATIVE
LYMPHOCYTES ABSOLUTE: 1.7 K/CU MM
LYMPHOCYTES RELATIVE PERCENT: 31 % (ref 24–44)
MCH RBC QN AUTO: 29.9 PG (ref 27–31)
MCHC RBC AUTO-ENTMCNC: 32.5 % (ref 32–36)
MCV RBC AUTO: 92.1 FL (ref 78–100)
MONOCYTES ABSOLUTE: 1 K/CU MM
MONOCYTES RELATIVE PERCENT: 18 % (ref 0–4)
NITRITE URINE, QUANTITATIVE: NEGATIVE
PDW BLD-RTO: 14.4 % (ref 11.7–14.9)
PH, URINE: 5.5 (ref 5–8)
PLATELET # BLD: 158 K/CU MM (ref 140–440)
PLT MORPHOLOGY: ABNORMAL
PMV BLD AUTO: 9.9 FL (ref 7.5–11.1)
POTASSIUM SERPL-SCNC: 4.5 MMOL/L (ref 3.5–5.1)
PROTEIN UA: NEGATIVE MG/DL
RBC # BLD: 4.91 M/CU MM (ref 4.2–5.4)
RBC # BLD: ABNORMAL 10*6/UL
SEGMENTED NEUTROPHILS ABSOLUTE COUNT: 2.6 K/CU MM
SEGMENTED NEUTROPHILS RELATIVE PERCENT: 50 % (ref 36–66)
SODIUM BLD-SCNC: 144 MMOL/L (ref 135–145)
SPECIFIC GRAVITY UA: 1.02 (ref 1–1.03)
TOTAL PROTEIN: 5.9 GM/DL (ref 6.4–8.2)
UROBILINOGEN, URINE: 0.2 MG/DL (ref 0.2–1)
WBC # BLD: 5.4 K/CU MM (ref 4–10.5)

## 2022-11-16 PROCEDURE — 93010 ELECTROCARDIOGRAM REPORT: CPT | Performed by: INTERNAL MEDICINE

## 2022-11-16 PROCEDURE — 70450 CT HEAD/BRAIN W/O DYE: CPT

## 2022-11-16 PROCEDURE — 80053 COMPREHEN METABOLIC PANEL: CPT

## 2022-11-16 PROCEDURE — 99285 EMERGENCY DEPT VISIT HI MDM: CPT

## 2022-11-16 PROCEDURE — 71045 X-RAY EXAM CHEST 1 VIEW: CPT

## 2022-11-16 PROCEDURE — 93005 ELECTROCARDIOGRAM TRACING: CPT | Performed by: EMERGENCY MEDICINE

## 2022-11-16 PROCEDURE — 83605 ASSAY OF LACTIC ACID: CPT

## 2022-11-16 PROCEDURE — 85027 COMPLETE CBC AUTOMATED: CPT

## 2022-11-16 PROCEDURE — 81003 URINALYSIS AUTO W/O SCOPE: CPT

## 2022-11-16 PROCEDURE — 87086 URINE CULTURE/COLONY COUNT: CPT

## 2022-11-16 PROCEDURE — 85007 BL SMEAR W/DIFF WBC COUNT: CPT

## 2022-11-16 RX ORDER — SULFAMETHOXAZOLE AND TRIMETHOPRIM 800; 160 MG/1; MG/1
1 TABLET ORAL 2 TIMES DAILY
Qty: 10 TABLET | Refills: 0 | Status: SHIPPED | OUTPATIENT
Start: 2022-11-16 | End: 2022-11-21

## 2022-11-16 ASSESSMENT — PAIN - FUNCTIONAL ASSESSMENT: PAIN_FUNCTIONAL_ASSESSMENT: NONE - DENIES PAIN

## 2022-11-16 ASSESSMENT — LIFESTYLE VARIABLES
HOW OFTEN DO YOU HAVE A DRINK CONTAINING ALCOHOL: NEVER
HOW MANY STANDARD DRINKS CONTAINING ALCOHOL DO YOU HAVE ON A TYPICAL DAY: PATIENT DOES NOT DRINK

## 2022-11-16 NOTE — DISCHARGE INSTRUCTIONS
Return to the emergency department if your condition worsens. Follow-up with your doctor in the next couple days. Use medications as directed.

## 2022-11-16 NOTE — ED TRIAGE NOTES
Arrived per [facility] to room 3 for triage. Tolerated without difficulty. Bed in lowest position. Call light given.  Gowned for exam.

## 2022-11-16 NOTE — ED PROVIDER NOTES
Emergency Department Encounter  Location: [facility] [address] [place] [address]    Patient: Leela Garcia  MRN: [id number]  : 3/26/1931  Date of evaluation: 2022  ED Provider: Valeria Shay DO, FACEP    Chief Complaint:    Altered Mental Status (Arrived per [facility]. EMS reports that she has not been acting like herself. States that is what usually happens when she has a UTI. Patient has no complaint and is not sure why she is here. )    [place]:  Leela Garcia is a 80 y.o. female that presents to the emergency department the nursing home with complaints of decreased mental status and not acting herself. Patient's had a history of urinary tract infections causing this problem previously. She states that she is not having any pain and denies any problems at this time. She is alert to person but not necessarily place or time. Denying chest pain shortness of breath abdominal pain nausea vomiting or diarrhea. ROS - see HPI, below listed is current ROS at time of my eval:  At least 10 systems reviewed and otherwise acutely negative except as in the [address]   General:  No fevers, no chills, no weakness  Eyes:  No recent vison changes, no discharge  ENT:  No sore throat, no nasal congestion, no hearing changes  Cardiovascular:  No chest pain, no palpitations  Respiratory:  No shortness of breath, no cough, no wheezing  Gastrointestinal:  No pain, no nausea, no vomiting, no diarrhea  Musculoskeletal:  No muscle pain, no joint pain  Skin:  No rash, no pruritis, no easy bruising  Neurologic:  No speech problems, no headache, no extremity numbness, no extremity tingling, no extremity weakness  Psychiatric:  No anxiety  Genitourinary:  No dysuria, no hematuria  Endocrine:  No unexpected weight gain, no unexpected weight loss  Extremities:  no edema, no pain    Past Medical History:   Diagnosis Date    Arthritis     Thyroid disease      Past Surgical History:   Procedure Laterality Date    APPENDECTOMY CHOLECYSTECTOMY      EYE SURGERY      FRACTURE SURGERY      HYSTERECTOMY (CERVIX STATUS UNKNOWN)      TONSILLECTOMY       History reviewed. No pertinent family history. Social History     Socioeconomic History    Marital status:      Spouse name: Not on file    Number of children: Not on file    Years of education: Not on file    Highest education level: Not on file   Occupational History    Not on file   Tobacco Use    Smoking status: Never    Smokeless tobacco: Never   Substance and Sexual Activity    Alcohol use: No    Drug use: No    Sexual activity: Never   Other Topics Concern    Not on file   Social History Narrative    Not on file     Social Determinants of Health     Financial Resource Strain: Not on file   Food Insecurity: Not on file   Transportation Needs: Not on file   Physical Activity: Not on file   Stress: Not on file   Social Connections: Not on file   Intimate Partner Violence: Not on file   Housing Stability: Not on file     No current facility-administered medications for this encounter.      Current Outpatient Medications   Medication Sig Dispense Refill    sulfamethoxazole-trimethoprim (BACTRIM DS) 800-160 MG per tablet Take 1 tablet by mouth 2 times daily for 5 days 10 tablet 0    levothyroxine (SYNTHROID) 75 MCG tablet Take 1 tablet by mouth Daily 30 tablet 3    ipratropium-albuterol (DUONEB) 0.5-2.5 (3) MG/3ML SOLN nebulizer solution Inhale 3 mLs into the lungs every 4 hours as needed for Shortness of Breath (While awake (4 doses max per day)) 360 mL 0    metoprolol succinate (TOPROL XL) 25 MG extended release tablet Take 12.5 mg by mouth daily       omeprazole (PRILOSEC) 20 MG capsule Take 20 mg by mouth daily       No Known Allergies    Nursing Notes Reviewed    Physical Exam:  ED Triage Vitals [11/16/22 1306]   Enc Vitals Group      BP       Heart Rate 70      Resp 22      Temp 98.1 °F (36.7 °C)      Temp Source Oral      SpO2 94 %      Weight 105 lb (47.6 kg)      Height 4' 11\" (1.499 m)      Head Circumference       Peak Flow       Pain Score       Pain Loc       Pain Edu? Excl. in [address]? GENERAL APPEARANCE: Awake and alert. Cooperative. No acute distress. Nontoxic in appearance  HEAD: Normocephalic. Atraumatic. EYES: EOM's grossly intact. Sclera anicteric. Pupils equally reactive to light   ENT: Tolerates saliva. No trismus. Oral mucosa is moist  NECK: Supple. Trachea midline. CARDIO:  Radial pulse 2+. Regular rate and rhythm without murmur  LUNGS: Respirations unlabored. CTAB. No wheezes rales or rhonchi  ABDOMEN: Soft. Non-distended. Non-tender. No guarding or rebound  EXTREMITIES: No acute deformities. No edema in lower extremities no pain to palpation of her calves  SKIN: Warm and dry. NEUROLOGICAL: No gross facial drooping. Moves all 4 extremities spontaneously. PSYCHIATRIC: Normal mood.      Labs:  Results for orders placed or performed during the hospital encounter of 11/16/22   CBC with Auto Differential   Result Value Ref Range    WBC 5.4 4.0 - 10.5 K/CU MM    RBC 4.91 4.2 - 5.4 M/CU MM    Hemoglobin 14.7 12.5 - 16.0 GM/DL    Hematocrit 45.2 37 - 47 %    MCV 92.1 78 - 100 FL    MCH 29.9 27 - 31 PG    MCHC 32.5 32.0 - 36.0 %    RDW 14.4 11.7 - 14.9 %    Platelets 819 201 - 246 K/CU MM    MPV 9.9 7.5 - 11.1 FL    Segs Relative 50.0 36 - 66 %    Eosinophils % 1.0 0 - 3 %    Lymphocytes % 31.0 24 - 44 %    Monocytes % 18.0 (H) 0 - 4 %    Segs Absolute 2.6 K/CU MM    Eosinophils Absolute 0.1 K/CU MM    Lymphocytes Absolute 1.7 K/CU MM    Monocytes Absolute 1.0 K/CU MM    Differential Type MANUAL DIFFERENTIAL     RBC Morphology RBC MORPHOLOGY NORMAL     PLT Morphology PLATELETS NORMAL IN NUMBER AND SIZE    Comprehensive Metabolic Panel   Result Value Ref Range    Sodium 144 135 - 145 MMOL/L    Potassium 4.5 3.5 - 5.1 MMOL/L    Chloride 108 99 - 110 mMol/L    CO2 26 21 - 32 MMOL/L    BUN 22 6 - 23 MG/DL    Creatinine 1.2 (H) 0.6 - 1.1 MG/DL    Est, Glom Filt Rate 43 (L) >60 mL/min/1.73m2    Glucose 118 (H) 70 - 99 MG/DL    Calcium 8.3 8.3 - 10.6 MG/DL    Albumin 3.6 3.4 - 5.0 GM/DL    Total Protein 5.9 (L) 6.4 - 8.2 GM/DL    Total Bilirubin 0.3 0.0 - 1.0 MG/DL    ALT 26 10 - 40 U/L    AST 42 (H) 15 - 37 IU/L    Alkaline Phosphatase 93 40 - 129 IU/L    Anion Gap 10 4 - 16   Urinalysis with Reflex to Culture    Specimen: Urine   Result Value Ref Range    Color, UA YELLOW YELLOW    Clarity, UA CLEAR CLEAR    Glucose, Urine NEGATIVE NEGATIVE MG/DL    Bilirubin Urine NEGATIVE NEGATIVE MG/DL    Ketones, Urine NEGATIVE NEGATIVE MG/DL    Specific Gravity, UA 1.025 1.001 - 1.035    Blood, Urine NEGATIVE NEGATIVE    pH, Urine 5.5 5.0 - 8.0    Protein, UA NEGATIVE NEGATIVE MG/DL    Urobilinogen, Urine 0.2 0.2 - 1.0 MG/DL    Nitrite Urine, Quantitative NEGATIVE NEGATIVE    Leukocyte Esterase, Urine NEGATIVE NEGATIVE   Lactic Acid   Result Value Ref Range    Lactate 2.5 (HH) 0.4 - 2.0 mMOL/L   Lactic Acid   Result Value Ref Range    Lactate 1.4 0.4 - 2.0 mMOL/L   EKG 12 Lead   Result Value Ref Range    Ventricular Rate 69 BPM    Atrial Rate 69 BPM    P-R Interval 130 ms    QRS Duration 94 ms    Q-T Interval 390 ms    QTc Calculation (Bazett) 417 ms    P Axis 37 degrees    R Axis -49 degrees    T Axis 121 degrees    Diagnosis       Sinus rhythm with premature supraventricular complexes  Left axis deviation  Inferior infarct (cited on or before 12-JAN-2022)  Anterolateral infarct (cited on or before 12-JAN-2022)  Abnormal ECG  When compared with ECG of 01-SEP-2022 23:01,  premature supraventricular complexes are now present  Vent. rate has decreased BY  38 BPM  QT has shortened  Confirmed by [facility] Trish LAURA ([id number]) on 11/16/2022 3:34:24 PM         EKG (if obtained): (All EKG's are interpreted by myself in the absence of a cardiologist)  The Ekg interpreted by me in the absence of a cardiologist shows.   normal sinus rhythm with a rate of 69  Axis is   Left axis deviation  QTc is normally when she has these type of problems she has urinary tract infection and there is no evidence of that today. I will send urine for culture. I will empirically start her on some Bactrim and her case was discussed with the hospitalist.  The patient does not really qualify for admission at this time and after discussion with the family was determined that the patient would like to go home. She will be discharged stable condition follow-up with her primary caregiver. She is to return if her condition worsens. She will be discharged stable condition at this time. Final Impression:  1. Mental status, decreased    2. Generalized weakness    3. Dysuria      DISPOSITION Decision To Discharge    Patient referred to:   Mandeep De La Cruz DO   [address] [place] [address],[address] [id number] [phone]    Schedule an appointment as soon as possible for a visit in 2 days  For follow up    [facility] Emergency Department  [address] [address] [phone]  Go to   If symptoms worsen  Discharge medications:  New Prescriptions    SULFAMETHOXAZOLE-TRIMETHOPRIM (BACTRIM DS) 800-160 MG PER TABLET    Take 1 tablet by mouth 2 times daily for 5 days     (Please note that portions of this note may have been completed with a voice recognition program. Efforts were made to edit the dictations but occasionally words are mis-transcribed.)    Jeanette Fox DO, [facility]  Board certified in [employer], [address]  11/16/22 8089

## 2022-11-18 LAB
CULTURE: NORMAL
Lab: NORMAL
SPECIMEN: NORMAL

## 2023-01-14 ENCOUNTER — HOSPITAL ENCOUNTER (EMERGENCY)
Age: 88
Discharge: ANOTHER HOSPITAL | End: 2023-01-14
Attending: EMERGENCY MEDICINE
Payer: MEDICARE

## 2023-01-14 ENCOUNTER — APPOINTMENT (OUTPATIENT)
Dept: GENERAL RADIOLOGY | Age: 88
End: 2023-01-14
Payer: MEDICARE

## 2023-01-14 ENCOUNTER — HOSPITAL ENCOUNTER (INPATIENT)
Age: 88
LOS: 3 days | Discharge: HOME HEALTH | DRG: 309 | End: 2023-01-17
Attending: INTERNAL MEDICINE | Admitting: STUDENT IN AN ORGANIZED HEALTH CARE EDUCATION/TRAINING PROGRAM
Payer: MEDICARE

## 2023-01-14 VITALS
OXYGEN SATURATION: 95 % | DIASTOLIC BLOOD PRESSURE: 81 MMHG | HEART RATE: 86 BPM | TEMPERATURE: 98 F | SYSTOLIC BLOOD PRESSURE: 107 MMHG | BODY MASS INDEX: 22.18 KG/M2 | RESPIRATION RATE: 29 BRPM | WEIGHT: 110 LBS | HEIGHT: 59 IN

## 2023-01-14 DIAGNOSIS — I48.91 NEW ONSET ATRIAL FIBRILLATION (HCC): Primary | ICD-10-CM

## 2023-01-14 DIAGNOSIS — R77.8 ELEVATED TROPONIN: ICD-10-CM

## 2023-01-14 DIAGNOSIS — I48.91 ATRIAL FIBRILLATION WITH RAPID VENTRICULAR RESPONSE (HCC): ICD-10-CM

## 2023-01-14 LAB
ALBUMIN SERPL-MCNC: 3.9 GM/DL (ref 3.4–5)
ALP BLD-CCNC: 93 IU/L (ref 40–129)
ALT SERPL-CCNC: 12 U/L (ref 10–40)
ANION GAP SERPL CALCULATED.3IONS-SCNC: 16 MMOL/L (ref 4–16)
AST SERPL-CCNC: 23 IU/L (ref 15–37)
BASOPHILS ABSOLUTE: 0.1 K/CU MM
BASOPHILS RELATIVE PERCENT: 0.7 % (ref 0–1)
BILIRUB SERPL-MCNC: 0.9 MG/DL (ref 0–1)
BUN BLDV-MCNC: 18 MG/DL (ref 6–23)
CALCIUM SERPL-MCNC: 8.9 MG/DL (ref 8.3–10.6)
CHLORIDE BLD-SCNC: 106 MMOL/L (ref 99–110)
CO2: 21 MMOL/L (ref 21–32)
CREAT SERPL-MCNC: 1.3 MG/DL (ref 0.6–1.1)
DIFFERENTIAL TYPE: ABNORMAL
EOSINOPHILS ABSOLUTE: 0 K/CU MM
EOSINOPHILS RELATIVE PERCENT: 0.4 % (ref 0–3)
GFR SERPL CREATININE-BSD FRML MDRD: 39 ML/MIN/1.73M2
GLUCOSE BLD-MCNC: 118 MG/DL (ref 70–99)
HCT VFR BLD CALC: 43.3 % (ref 37–47)
HEMOGLOBIN: 14 GM/DL (ref 12.5–16)
IMMATURE NEUTROPHIL %: 0.4 % (ref 0–0.43)
INR BLD: 1.23 INDEX
LYMPHOCYTES ABSOLUTE: 1.9 K/CU MM
LYMPHOCYTES RELATIVE PERCENT: 18.7 % (ref 24–44)
MAGNESIUM: 2 MG/DL (ref 1.8–2.4)
MCH RBC QN AUTO: 29.9 PG (ref 27–31)
MCHC RBC AUTO-ENTMCNC: 32.3 % (ref 32–36)
MCV RBC AUTO: 92.3 FL (ref 78–100)
MONOCYTES ABSOLUTE: 1.2 K/CU MM
MONOCYTES RELATIVE PERCENT: 12.3 % (ref 0–4)
PDW BLD-RTO: 14.6 % (ref 11.7–14.9)
PLATELET # BLD: 191 K/CU MM (ref 140–440)
PMV BLD AUTO: 10.4 FL (ref 7.5–11.1)
POTASSIUM SERPL-SCNC: 4.4 MMOL/L (ref 3.5–5.1)
PRO-BNP: 9289 PG/ML
PROTHROMBIN TIME: 14.9 SECONDS (ref 11.7–14.5)
RAPID INFLUENZA  B AGN: NEGATIVE
RAPID INFLUENZA A AGN: NEGATIVE
RBC # BLD: 4.69 M/CU MM (ref 4.2–5.4)
SARS-COV-2, NAAT: NOT DETECTED
SEGMENTED NEUTROPHILS ABSOLUTE COUNT: 6.8 K/CU MM
SEGMENTED NEUTROPHILS RELATIVE PERCENT: 67.5 % (ref 36–66)
SODIUM BLD-SCNC: 143 MMOL/L (ref 135–145)
SOURCE: NORMAL
T4 FREE: 1.52 NG/DL (ref 0.9–1.8)
TOTAL IMMATURE NEUTOROPHIL: 0.04 K/CU MM
TOTAL PROTEIN: 7 GM/DL (ref 6.4–8.2)
TROPONIN T: 0.01 NG/ML
TROPONIN T: 0.02 NG/ML
TSH HIGH SENSITIVITY: 5.39 UIU/ML (ref 0.27–4.2)
WBC # BLD: 10 K/CU MM (ref 4–10.5)

## 2023-01-14 PROCEDURE — 2580000003 HC RX 258: Performed by: EMERGENCY MEDICINE

## 2023-01-14 PROCEDURE — 87635 SARS-COV-2 COVID-19 AMP PRB: CPT

## 2023-01-14 PROCEDURE — 84443 ASSAY THYROID STIM HORMONE: CPT

## 2023-01-14 PROCEDURE — 85610 PROTHROMBIN TIME: CPT

## 2023-01-14 PROCEDURE — 84484 ASSAY OF TROPONIN QUANT: CPT

## 2023-01-14 PROCEDURE — 80053 COMPREHEN METABOLIC PANEL: CPT

## 2023-01-14 PROCEDURE — 99285 EMERGENCY DEPT VISIT HI MDM: CPT

## 2023-01-14 PROCEDURE — 6360000002 HC RX W HCPCS: Performed by: INTERNAL MEDICINE

## 2023-01-14 PROCEDURE — 71046 X-RAY EXAM CHEST 2 VIEWS: CPT

## 2023-01-14 PROCEDURE — 2500000003 HC RX 250 WO HCPCS: Performed by: EMERGENCY MEDICINE

## 2023-01-14 PROCEDURE — 84439 ASSAY OF FREE THYROXINE: CPT

## 2023-01-14 PROCEDURE — 6370000000 HC RX 637 (ALT 250 FOR IP): Performed by: INTERNAL MEDICINE

## 2023-01-14 PROCEDURE — 2140000000 HC CCU INTERMEDIATE R&B

## 2023-01-14 PROCEDURE — 96374 THER/PROPH/DIAG INJ IV PUSH: CPT

## 2023-01-14 PROCEDURE — 36415 COLL VENOUS BLD VENIPUNCTURE: CPT

## 2023-01-14 PROCEDURE — 2500000003 HC RX 250 WO HCPCS: Performed by: INTERNAL MEDICINE

## 2023-01-14 PROCEDURE — 85025 COMPLETE CBC W/AUTO DIFF WBC: CPT

## 2023-01-14 PROCEDURE — 93005 ELECTROCARDIOGRAM TRACING: CPT | Performed by: EMERGENCY MEDICINE

## 2023-01-14 PROCEDURE — 2580000003 HC RX 258: Performed by: INTERNAL MEDICINE

## 2023-01-14 PROCEDURE — 87804 INFLUENZA ASSAY W/OPTIC: CPT

## 2023-01-14 PROCEDURE — 96376 TX/PRO/DX INJ SAME DRUG ADON: CPT

## 2023-01-14 PROCEDURE — 83735 ASSAY OF MAGNESIUM: CPT

## 2023-01-14 PROCEDURE — 83880 ASSAY OF NATRIURETIC PEPTIDE: CPT

## 2023-01-14 RX ORDER — 0.9 % SODIUM CHLORIDE 0.9 %
1000 INTRAVENOUS SOLUTION INTRAVENOUS ONCE
Status: COMPLETED | OUTPATIENT
Start: 2023-01-14 | End: 2023-01-14

## 2023-01-14 RX ORDER — DILTIAZEM HYDROCHLORIDE 5 MG/ML
5 INJECTION INTRAVENOUS ONCE
Status: COMPLETED | OUTPATIENT
Start: 2023-01-14 | End: 2023-01-14

## 2023-01-14 RX ORDER — SODIUM CHLORIDE 0.9 % (FLUSH) 0.9 %
5-40 SYRINGE (ML) INJECTION PRN
Status: DISCONTINUED | OUTPATIENT
Start: 2023-01-14 | End: 2023-01-17 | Stop reason: HOSPADM

## 2023-01-14 RX ORDER — ONDANSETRON 2 MG/ML
4 INJECTION INTRAMUSCULAR; INTRAVENOUS EVERY 6 HOURS PRN
Status: DISCONTINUED | OUTPATIENT
Start: 2023-01-14 | End: 2023-01-17 | Stop reason: HOSPADM

## 2023-01-14 RX ORDER — ENOXAPARIN SODIUM 100 MG/ML
1 INJECTION SUBCUTANEOUS DAILY
Status: DISCONTINUED | OUTPATIENT
Start: 2023-01-14 | End: 2023-01-15

## 2023-01-14 RX ORDER — FUROSEMIDE 10 MG/ML
20 INJECTION INTRAMUSCULAR; INTRAVENOUS ONCE
Status: COMPLETED | OUTPATIENT
Start: 2023-01-14 | End: 2023-01-14

## 2023-01-14 RX ORDER — SODIUM CHLORIDE 9 MG/ML
INJECTION, SOLUTION INTRAVENOUS PRN
Status: DISCONTINUED | OUTPATIENT
Start: 2023-01-14 | End: 2023-01-17 | Stop reason: HOSPADM

## 2023-01-14 RX ORDER — DILTIAZEM HYDROCHLORIDE 5 MG/ML
10 INJECTION INTRAVENOUS ONCE
Status: COMPLETED | OUTPATIENT
Start: 2023-01-14 | End: 2023-01-14

## 2023-01-14 RX ORDER — IPRATROPIUM BROMIDE AND ALBUTEROL SULFATE 2.5; .5 MG/3ML; MG/3ML
1 SOLUTION RESPIRATORY (INHALATION) EVERY 4 HOURS PRN
Status: DISCONTINUED | OUTPATIENT
Start: 2023-01-14 | End: 2023-01-17 | Stop reason: HOSPADM

## 2023-01-14 RX ORDER — ONDANSETRON 4 MG/1
4 TABLET, ORALLY DISINTEGRATING ORAL EVERY 8 HOURS PRN
Status: DISCONTINUED | OUTPATIENT
Start: 2023-01-14 | End: 2023-01-17 | Stop reason: HOSPADM

## 2023-01-14 RX ORDER — METOPROLOL SUCCINATE 25 MG/1
25 TABLET, EXTENDED RELEASE ORAL DAILY
Status: DISCONTINUED | OUTPATIENT
Start: 2023-01-14 | End: 2023-01-16

## 2023-01-14 RX ORDER — POLYETHYLENE GLYCOL 3350 17 G/17G
17 POWDER, FOR SOLUTION ORAL DAILY PRN
Status: DISCONTINUED | OUTPATIENT
Start: 2023-01-14 | End: 2023-01-17 | Stop reason: HOSPADM

## 2023-01-14 RX ORDER — ACETAMINOPHEN 650 MG/1
650 SUPPOSITORY RECTAL EVERY 6 HOURS PRN
Status: DISCONTINUED | OUTPATIENT
Start: 2023-01-14 | End: 2023-01-17 | Stop reason: HOSPADM

## 2023-01-14 RX ORDER — LEVOTHYROXINE SODIUM 0.07 MG/1
75 TABLET ORAL DAILY
Status: DISCONTINUED | OUTPATIENT
Start: 2023-01-15 | End: 2023-01-17 | Stop reason: HOSPADM

## 2023-01-14 RX ORDER — SODIUM CHLORIDE 0.9 % (FLUSH) 0.9 %
5-40 SYRINGE (ML) INJECTION EVERY 12 HOURS SCHEDULED
Status: DISCONTINUED | OUTPATIENT
Start: 2023-01-14 | End: 2023-01-17 | Stop reason: HOSPADM

## 2023-01-14 RX ORDER — METOPROLOL SUCCINATE 25 MG/1
25 TABLET, EXTENDED RELEASE ORAL DAILY
Status: DISCONTINUED | OUTPATIENT
Start: 2023-01-15 | End: 2023-01-14

## 2023-01-14 RX ORDER — PANTOPRAZOLE SODIUM 40 MG/1
40 TABLET, DELAYED RELEASE ORAL
Status: DISCONTINUED | OUTPATIENT
Start: 2023-01-15 | End: 2023-01-17 | Stop reason: HOSPADM

## 2023-01-14 RX ORDER — ACETAMINOPHEN 325 MG/1
650 TABLET ORAL EVERY 6 HOURS PRN
Status: DISCONTINUED | OUTPATIENT
Start: 2023-01-14 | End: 2023-01-17 | Stop reason: HOSPADM

## 2023-01-14 RX ADMIN — Medication 10 ML: at 21:59

## 2023-01-14 RX ADMIN — ENOXAPARIN SODIUM 50 MG: 100 INJECTION SUBCUTANEOUS at 21:59

## 2023-01-14 RX ADMIN — DILTIAZEM HYDROCHLORIDE 10 MG: 5 INJECTION, SOLUTION INTRAVENOUS at 18:47

## 2023-01-14 RX ADMIN — SODIUM CHLORIDE 1000 ML: 9 INJECTION, SOLUTION INTRAVENOUS at 13:42

## 2023-01-14 RX ADMIN — DILTIAZEM HYDROCHLORIDE 10 MG: 5 INJECTION, SOLUTION INTRAVENOUS at 13:43

## 2023-01-14 RX ADMIN — FUROSEMIDE 20 MG: 10 INJECTION, SOLUTION INTRAVENOUS at 22:39

## 2023-01-14 RX ADMIN — DILTIAZEM HYDROCHLORIDE 10 MG: 5 INJECTION, SOLUTION INTRAVENOUS at 15:17

## 2023-01-14 RX ADMIN — DEXTROSE MONOHYDRATE 5 MG/HR: 50 INJECTION, SOLUTION INTRAVENOUS at 22:46

## 2023-01-14 RX ADMIN — METOPROLOL SUCCINATE 25 MG: 25 TABLET, EXTENDED RELEASE ORAL at 22:40

## 2023-01-14 RX ADMIN — DILTIAZEM HYDROCHLORIDE 5 MG: 5 INJECTION INTRAVENOUS at 21:59

## 2023-01-14 ASSESSMENT — ENCOUNTER SYMPTOMS
BLOOD IN STOOL: 0
EYE PAIN: 0
CHEST TIGHTNESS: 0
COUGH: 1
CONSTIPATION: 0
ABDOMINAL PAIN: 0
PHOTOPHOBIA: 0
DIARRHEA: 0
ABDOMINAL DISTENTION: 0
EYE DISCHARGE: 0
EYE REDNESS: 0
SINUS PAIN: 0
SHORTNESS OF BREATH: 1
EYE ITCHING: 0
RHINORRHEA: 0
TROUBLE SWALLOWING: 0
BACK PAIN: 0
VOMITING: 0
VOICE CHANGE: 0
NAUSEA: 0
SORE THROAT: 0

## 2023-01-14 NOTE — ED NOTES
Pt resting family at bedside     Holland Whitaker, [facility]  01/14/23 9364

## 2023-01-14 NOTE — ED PROVIDER NOTES
[facility] ENCOUNTER      Pt Name: Beatrice Riggins  MRN: [id number]  [place] 3/26/1931  Date of evaluation: 1/14/2023  Provider: Joshua Felton MD  Insurance:  Payor: [address] / Plan: Herminia Gomes / Product Type: *No Product type* /   Current Code Status   Code Status: Prior     CHIEF COMPLAINT       Chief Complaint   Patient presents with    Shortness of Breath     Pt sent urgent care for a-fib    Atrial Fibrillation     No hx         HISTORY OF PRESENT ILLNESS    HPI    Nursing Notes were reviewed. This is a 80 y.o. female who presents to the emergency department with tachycardia from urgent care. Patient's family is with her and says that they brought her to urgent care because she has had new worsening cough and mild shortness of breath and weakness over the past 1 to 2 days. The patient denies any significant concerning symptoms when questioned. She denies chest pain. She denies palpitations. She denies nausea or vomiting. The patient is significantly hard of hearing and additional information is obtained from family members. The patient was evaluated urgent care and found to be in atrial fibrillation with rapid ventricular response and as a result she was sent to the emergency department for evaluation. Patient has no history of atrial fibrillation.     PAST MEDICAL HISTORY     Past Medical History:   Diagnosis Date    Arthritis     Thyroid disease          SURGICAL HISTORY       Past Surgical History:   Procedure Laterality Date    APPENDECTOMY      CHOLECYSTECTOMY      EYE SURGERY      FRACTURE SURGERY      HYSTERECTOMY (CERVIX STATUS UNKNOWN)      TONSILLECTOMY           CURRENT MEDICATIONS       Previous Medications    IPRATROPIUM-ALBUTEROL (DUONEB) 0.5-2.5 (3) MG/3ML SOLN NEBULIZER SOLUTION    Inhale 3 mLs into the lungs every 4 hours as needed for Shortness of Breath (While awake (4 doses max per day))    LEVOTHYROXINE (SYNTHROID) 75 MCG TABLET    Take 1 tablet by mouth Daily    METOPROLOL SUCCINATE (TOPROL XL) 25 MG EXTENDED RELEASE TABLET    Take 12.5 mg by mouth daily     OMEPRAZOLE (PRILOSEC) 20 MG CAPSULE    Take 20 mg by mouth daily       ALLERGIES     Patient has no known allergies. FAMILY HISTORY     History reviewed. No pertinent family history. SOCIAL HISTORY       Social History     Socioeconomic History    Marital status:      Spouse name: None    Number of children: None    Years of education: None    Highest education level: None   Tobacco Use    Smoking status: Never    Smokeless tobacco: Never   Substance and Sexual Activity    Alcohol use: No    Drug use: No    Sexual activity: Never       PHYSICAL EXAM       ED Triage Vitals   BP Temp Temp Source Heart Rate Resp SpO2 Height Weight   01/14/23 1343 01/14/23 1302 01/14/23 1302 01/14/23 1302 01/14/23 1302 01/14/23 1302 01/14/23 1302 01/14/23 1302   (!) 98/54 98 °F (36.7 °C) Oral (!) 165 18 94 % 4' 11\" (1.499 m) 110 lb (49.9 kg)       Physical Exam  Vitals reviewed. Constitutional:       General: She is not in acute distress. Appearance: She is not ill-appearing, toxic-appearing or diaphoretic. HENT:      Head: Normocephalic and atraumatic. Eyes:      Extraocular Movements: Extraocular movements intact. Cardiovascular:      Rate and Rhythm: Tachycardia present. Rhythm irregular. Heart sounds: No murmur heard. Pulmonary:      Effort: Pulmonary effort is normal.      Breath sounds: Normal breath sounds. No decreased breath sounds, wheezing, rhonchi or rales. Skin:     General: Skin is warm. Neurological:      Mental Status: She is alert.    Psychiatric:         Behavior: Behavior normal.       Vitals:    Vitals:    01/14/23 1302 01/14/23 1343   BP:  (!) 98/54   Pulse: (!) 165 (!) 159   Resp: 18    Temp: 98 °F (36.7 °C)    TempSrc: Oral    SpO2: 94%    Weight: 110 lb (49.9 kg)    Height: 4' 11\" (1.499 m)        DIAGNOSTIC RESULTS EKG: All EKG's are interpreted by the Emergency Department Physician who either signs or Co-signs this chart in the absence of a cardiologist.    Atrial fibrillation with rapid ventricular response. Ventricular rate of 153. QRS is 96. QTc is 456. There are no abnormal ST elevations or depressions. There is no ventricular ectopy. Atrial fibrillation is new when compared to prior EKG from November 2022. RADIOLOGY:   Non-plain film images such as CT, Ultrasound and MRI are read by the radiologist. Plain radiographic images are visualized and preliminarily interpreted by the emergency physician with the below findings:    Interpretation per the Radiologist below, if available at the time of this note:    XR CHEST (2 VW)   Final Result   Mild cardiomegaly and perihilar congestion but no consolidative infiltrates   or active pleural disease.              LABS:  Labs Reviewed   CBC WITH AUTO DIFFERENTIAL - Abnormal; Notable for the following components:       Result Value    Segs Relative 67.5 (*)     Lymphocytes % 18.7 (*)     Monocytes % 12.3 (*)     All other components within normal limits   COMPREHENSIVE METABOLIC PANEL - Abnormal; Notable for the following components:    Creatinine 1.3 (*)     Est, Glom Filt Rate 39 (*)     Glucose 118 (*)     All other components within normal limits   PROTIME-INR - Abnormal; Notable for the following components:    Protime 14.9 (*)     All other components within normal limits   TROPONIN - Abnormal; Notable for the following components:    Troponin T 0.015 (*)     All other components within normal limits   TSH - Abnormal; Notable for the following components:    TSH, High Sensitivity 5.390 (*)     All other components within normal limits   BRAIN NATRIURETIC PEPTIDE - Abnormal; Notable for the following components:    Pro-BNP 9,289 (*)     All other components within normal limits   COVID-19, RAPID   RAPID INFLUENZA A/B ANTIGENS   MAGNESIUM   T4, FREE   URINALYSIS All other labs were within normal range or not returned as of this dictation. MEDICAL DECISION MAKING     Medications   0.9 % sodium chloride bolus (1,000 mLs IntraVENous New Bag 1/14/23 1342)   dilTIAZem injection 10 mg (10 mg IntraVENous Given 1/14/23 1343)   dilTIAZem injection 10 mg (10 mg IntraVENous Given 1/14/23 1517)             [place] Coma Scale  Eye Opening: Spontaneous  Best Verbal Response: Oriented  Best Motor Response: Obeys commands  [place] Com[place] Scale Score: 15                     [facility] Assessment  BP: (!) 98/54  Heart Rate: (!) 159                 MDM  This is a 80 y.o. female who presents to the emergency department with tachycardia from urgent care. Patient's family is with her and says that they brought her to urgent care because she has had new worsening cough and mild shortness of breath and weakness over the past 1 to 2 days. The patient denies any significant concerning symptoms when questioned. She denies chest pain. She denies palpitations. She denies nausea or vomiting. The patient is significantly hard of hearing and additional information is obtained from family members. The patient was evaluated urgent care and found to be in atrial fibrillation with rapid ventricular response and as a result she was sent to the emergency department for evaluation. Patient has no history of atrial fibrillation. The patient is a cardiac history consists of hypertension and has no history of atrial fibrillation or dysrhythmia. She does not currently have a cardiologist.    ECG demonstrates atrial fibrillation with rapid ventricular response. This is a new condition for this patient that she has not previously demonstrated. There are no ST elevations indicative of STEMI. Cardiac enzymes are minimally elevated 0.015 however feel this likely represents demand ischemia troponin leak and is less likely to represent NSTEMI. Magnesium is normal at 2.0.     The patient was given 10 mg of Cardizem here in the emergency department with appropriate rate control with a heart rate less than 100 at 1430. She remains in atrial fibrillation    The patient has acute, new onset, atrial fibrillation with rapid ventricular response. She has an acute elevation of her troponin. The patient will require inpatient evaluation by medicine and cardiology for her new onset atrial fibrillation. He was discussed with the hospitalist at [facility] and they have agreed to except the patient for further evaluation. Plan [facility] transfer to [facility]    Clinical Diagnoses Addressed  1. New onset atrial fibrillation ([address]    2. Atrial fibrillation with rapid ventricular response (HCC)    3. Elevated troponin            CRITICAL CARE TIME   Total Critical Care time was 30 minutes, excluding separately reportable procedures. There was a high probability of clinically significant/life threatening deterioration in the patient's condition which required my urgent intervention. CONSULTS:  None    PROCEDURES:  Unless otherwise noted below, none     Procedures      FINAL IMPRESSION      1. New onset atrial fibrillation ([address]    2. Atrial fibrillation with rapid ventricular response (HCC)    3. Elevated troponin          DISPOSITION/PLAN   DISPOSITION Decision To Transfer 01/14/2023 02:46:19 PM      PATIENT REFERRED TO:  No follow-up provider specified. DISCHARGE MEDICATIONS:  New Prescriptions    No medications on file     Controlled Substances Monitoring:     No flowsheet data found.     Marquis Maddox MD (electronically signed)  Attending Emergency Physician            Marquis Maddox MD  01/14/23 [phone]

## 2023-01-15 ENCOUNTER — APPOINTMENT (OUTPATIENT)
Dept: ULTRASOUND IMAGING | Age: 88
DRG: 309 | End: 2023-01-15
Attending: INTERNAL MEDICINE
Payer: MEDICARE

## 2023-01-15 LAB
ANION GAP SERPL CALCULATED.3IONS-SCNC: 11 MMOL/L (ref 4–16)
BASOPHILS ABSOLUTE: 0.1 K/CU MM
BASOPHILS RELATIVE PERCENT: 0.8 % (ref 0–1)
BUN BLDV-MCNC: 17 MG/DL (ref 6–23)
CALCIUM SERPL-MCNC: 8 MG/DL (ref 8.3–10.6)
CHLORIDE BLD-SCNC: 107 MMOL/L (ref 99–110)
CO2: 23 MMOL/L (ref 21–32)
CREAT SERPL-MCNC: 1.3 MG/DL (ref 0.6–1.1)
DIFFERENTIAL TYPE: ABNORMAL
EKG DIAGNOSIS: NORMAL
EKG Q-T INTERVAL: 286 MS
EKG QRS DURATION: 96 MS
EKG QTC CALCULATION (BAZETT): 456 MS
EKG R AXIS: -48 DEGREES
EKG T AXIS: 120 DEGREES
EKG VENTRICULAR RATE: 153 BPM
EOSINOPHILS ABSOLUTE: 0.2 K/CU MM
EOSINOPHILS RELATIVE PERCENT: 2.6 % (ref 0–3)
GFR SERPL CREATININE-BSD FRML MDRD: 39 ML/MIN/1.73M2
GLUCOSE BLD-MCNC: 87 MG/DL (ref 70–99)
HCT VFR BLD CALC: 37.1 % (ref 37–47)
HEMOGLOBIN: 11.9 GM/DL (ref 12.5–16)
IMMATURE NEUTROPHIL %: 0.3 % (ref 0–0.43)
LYMPHOCYTES ABSOLUTE: 1.9 K/CU MM
LYMPHOCYTES RELATIVE PERCENT: 30.3 % (ref 24–44)
MAGNESIUM: 1.8 MG/DL (ref 1.8–2.4)
MCH RBC QN AUTO: 29.8 PG (ref 27–31)
MCHC RBC AUTO-ENTMCNC: 32.1 % (ref 32–36)
MCV RBC AUTO: 92.8 FL (ref 78–100)
MONOCYTES ABSOLUTE: 0.8 K/CU MM
MONOCYTES RELATIVE PERCENT: 12.8 % (ref 0–4)
NUCLEATED RBC %: 0 %
PDW BLD-RTO: 14.4 % (ref 11.7–14.9)
PHOSPHORUS: 3.4 MG/DL (ref 2.5–4.9)
PLATELET # BLD: 143 K/CU MM (ref 140–440)
PMV BLD AUTO: 10.8 FL (ref 7.5–11.1)
POTASSIUM SERPL-SCNC: 3.9 MMOL/L (ref 3.5–5.1)
RBC # BLD: 4 M/CU MM (ref 4.2–5.4)
SEGMENTED NEUTROPHILS ABSOLUTE COUNT: 3.3 K/CU MM
SEGMENTED NEUTROPHILS RELATIVE PERCENT: 53.2 % (ref 36–66)
SODIUM BLD-SCNC: 141 MMOL/L (ref 135–145)
TOTAL IMMATURE NEUTOROPHIL: 0.02 K/CU MM
TOTAL NUCLEATED RBC: 0 K/CU MM
TSH HIGH SENSITIVITY: 3.33 UIU/ML (ref 0.27–4.2)
WBC # BLD: 6.3 K/CU MM (ref 4–10.5)

## 2023-01-15 PROCEDURE — 84100 ASSAY OF PHOSPHORUS: CPT

## 2023-01-15 PROCEDURE — 36415 COLL VENOUS BLD VENIPUNCTURE: CPT

## 2023-01-15 PROCEDURE — 2580000003 HC RX 258: Performed by: INTERNAL MEDICINE

## 2023-01-15 PROCEDURE — 86800 THYROGLOBULIN ANTIBODY: CPT

## 2023-01-15 PROCEDURE — 99223 1ST HOSP IP/OBS HIGH 75: CPT | Performed by: INTERNAL MEDICINE

## 2023-01-15 PROCEDURE — 84443 ASSAY THYROID STIM HORMONE: CPT

## 2023-01-15 PROCEDURE — 6370000000 HC RX 637 (ALT 250 FOR IP): Performed by: FAMILY MEDICINE

## 2023-01-15 PROCEDURE — 94761 N-INVAS EAR/PLS OXIMETRY MLT: CPT

## 2023-01-15 PROCEDURE — 93010 ELECTROCARDIOGRAM REPORT: CPT | Performed by: INTERNAL MEDICINE

## 2023-01-15 PROCEDURE — 85025 COMPLETE CBC W/AUTO DIFF WBC: CPT

## 2023-01-15 PROCEDURE — 83735 ASSAY OF MAGNESIUM: CPT

## 2023-01-15 PROCEDURE — 80048 BASIC METABOLIC PNL TOTAL CA: CPT

## 2023-01-15 PROCEDURE — 6370000000 HC RX 637 (ALT 250 FOR IP): Performed by: INTERNAL MEDICINE

## 2023-01-15 PROCEDURE — 86376 MICROSOMAL ANTIBODY EACH: CPT

## 2023-01-15 PROCEDURE — 2140000000 HC CCU INTERMEDIATE R&B

## 2023-01-15 RX ORDER — LANOLIN ALCOHOL/MO/W.PET/CERES
3 CREAM (GRAM) TOPICAL NIGHTLY PRN
Status: DISCONTINUED | OUTPATIENT
Start: 2023-01-16 | End: 2023-01-17 | Stop reason: HOSPADM

## 2023-01-15 RX ADMIN — APIXABAN 2.5 MG: 2.5 TABLET, FILM COATED ORAL at 15:43

## 2023-01-15 RX ADMIN — LEVOTHYROXINE SODIUM 75 MCG: 0.07 TABLET ORAL at 05:44

## 2023-01-15 RX ADMIN — Medication 10 ML: at 21:16

## 2023-01-15 RX ADMIN — Medication 3 MG: at 23:55

## 2023-01-15 RX ADMIN — DILTIAZEM HYDROCHLORIDE 30 MG: 30 TABLET, FILM COATED ORAL at 15:43

## 2023-01-15 RX ADMIN — DILTIAZEM HYDROCHLORIDE 30 MG: 30 TABLET, FILM COATED ORAL at 23:03

## 2023-01-15 RX ADMIN — PANTOPRAZOLE SODIUM 40 MG: 40 TABLET, DELAYED RELEASE ORAL at 05:44

## 2023-01-15 RX ADMIN — METOPROLOL SUCCINATE 25 MG: 25 TABLET, EXTENDED RELEASE ORAL at 21:15

## 2023-01-15 NOTE — CONSULTS
Endocrinology   Consult Note  1/14/2023  7:53 PM     Primary Care provider: Elio Goldmann, DO     Referring physician:  No admitting provider for patient encounter. Dear Doctor Victor Manuel Garcia for the Consult     Pt. Was Admitted for : Atrial fibrillation    Reason for Consult: Review thyroid function test??  Appropriate dose      History Obtained From:  Patient/ EMR       HISTORY OF PRESENT ILLNESS:                The patient is a 80 y.o. female with significant past medical history of hypothyroidism, right ovarian cystic mass mild cognitive impairment presented to ER initially with atrial fibrillation-new onset with rapid ventricular response at [facility] and she was transferred here patient also complaining of shortness of breath palpitation. Patient has been fairly healthy for age few to be less as described above. He was running mildly elevated TSH with normal serum free T4 levels I was  consulted for better elevated thyroid function. Patient has been taking Synthroid for a long time. She did not have any thyroid surgery or any goiters in the past either      ROS:   Pt's ROS done in detail. Abnormal ROS are noted in Medical and Surgical History Section below: Other Medical History:        Diagnosis Date    Arthritis     Thyroid disease      Surgical History:        Procedure Laterality Date    APPENDECTOMY      CHOLECYSTECTOMY      EYE SURGERY      FRACTURE SURGERY      HYSTERECTOMY (CERVIX STATUS UNKNOWN)      TONSILLECTOMY         Allergies:  Patient has no known allergies. Family History:   No family history on file.   REVIEW OF SYSTEMS:  Review of System Done as noted above     PHYSICAL EXAM:      Vitals:    BP (!) 118/59   Pulse 77   Temp (!) 96.7 °F (35.9 °C) (Oral)   Resp 25   Ht 4' 11\" (1.499 m)   Wt 119 lb 14.9 oz (54.4 kg)   SpO2 96%   BMI 24.22 kg/m²     CONSTITUTIONAL:  awake, alert, cooperative, appears stated age   EYES:  vision intact Fundoscopic Exam not performed   ENT:Normal  NECK:  Supple, No JVD. Thyroid Exam:Normal   LUNGS:  Has Vesicular Breath Sounds,   CARDIOVASCULAR: Atrial fibrillation  ABDOMEN:  No scars, normal bowel sounds, soft, non-distended, non-tender, no masses palpated, no hepatolienomegaly  Musculoskeletal: Normal  Extremities: Normal, peripheral pulses normal, , has no edema   NEUROLOGIC:  Awake, alert, oriented to name, place and time. Cranial nerves II-XII are grossly intact. Motor is  intact. Sensory is intact. ,  and gait is abnormal.  Unstable  Patient uses walker for moving  around    DATA:    CBC:   Recent Labs     01/14/23  1315 01/15/23  0832   WBC 10.0 6.3   HGB 14.0 11.9*    143    CMP:  Recent Labs     01/14/23  1315 01/15/23  0832    141   K 4.4 3.9    107   CO2 21 23   BUN 18 17   CREATININE 1.3* 1.3*   CALCIUM 8.9 8.0*   PROT 7.0  --    LABALBU 3.9  --    BILITOT 0.9  --    ALKPHOS 93  --    AST 23  --    ALT 12  --      Lipids: No results found for: CHOL, HDL, TRIG  Glucose: No results for input(s): POCGLU in the last 72 hours. Hemoglobin A1C: No results found for: LABA1C  Free T4:   Lab Results   Component Value Date/Time    T4FREE 1.52 01/14/2023 01:15 PM     Free T3: No results found for: FT3  TSH High Sensitivity:   Lab Results   Component Value Date/Time    TSHHS 5.390 01/14/2023 01:15 PM       US HEAD NECK SOFT TISSUE THYROID    (Results Pending)          Scheduled Medicines   Medications:    apixaban  2.5 mg Oral BID    dilTIAZem  30 mg Oral 4 times per day    levothyroxine  75 mcg Oral Daily    pantoprazole  40 mg Oral QAM AC    sodium chloride flush  5-40 mL IntraVENous 2 times per day    metoprolol succinate  25 mg Oral Daily      Infusions:    sodium chloride           IMPRESSION    Patient Active Problem List   Diagnosis    AMS (altered mental status)    Physical debility    Atrial fibrillation ([facility] [address]         RECOMMENDATIONS:      Reviewed POC blood glucose .  Labs and X ray results   Reviewed Home and Current Medicines   Will continue on current dose of Synthroid   Will repeat TSH  . Do antithyroid antibodies / & U/S of thyroid        Will follow with you  Again thank you for sharing pt's care with me.      Truly yours,       Mejia Brannon MD

## 2023-01-15 NOTE — PROGRESS NOTES
Cardizem gtt titrated down to 2.5 mg/hr and pt HR in 80s. Per Dr. Chioma Rodríguez ok to d/c gtt. Pt will be started on PO Cardizem. Will continue to monitor.

## 2023-01-15 NOTE — H&P
V2.0  History and Physical      Name:  Jewel Palmer /Age/Sex: 3/26/1931  (80 y.o. female)   MRN & CSN:  [id number] & [id number] Encounter Date/Time: 2023 7:31 PM EST   Location:  [facility]1547-D PCP: Ishan De La Cruz, [address] [place] [address] Day: 1    Assessment and Plan:   Jewel Palmer is a 80 y.o. female with a pmh of hypothyroidism, hypertension,  who presents with Atrial fibrillation [facility]    Hospital Problems             Last Modified POA    * (Principal) Atrial fibrillation ([facility] [address] 2023 Yes     Atrial fibrillation, new onset, [id number] score 3  NSTEMI with Troponin 0.015, likely demand ischemia in the setting of A. fib RVR  Elevated proBNP, 9289, likely in the setting of A. fib RVR  SOB and cough, likely pulmonary congestion in the setting of Afib RVR  Patient does not report prior history of arrhythmia. Presented today with tachycardia, found to be in A. fib with RVR. Reportedly was given multiple doses of IV boluses of AV blocking medicines in the ER, but still presented with RVR to the floor. Does not report sensation of palpitation. No chest pain or shortness of breath. Reports has not taken her levothyroxine today but does take it regularly otherwise. No EKG changes otherwise. Troponin mildly elevated at 0.015.  -Give diltiazem 5 Mg IV bolus once  -Start diltiazem drip, target heart rate less than 100 bpm, hold for systolic blood pressure less than 90  -Increase the dose of metoprolol succinate to 25 Mg daily-to start now  -Anticoagulate with enoxaparin 1 Mg per KG daily (dose adjusted for creatinine clearance). Assess the risk of falls before continuing full anticoagulation.  -Lasix 20 mg IV once  -Obtain echo  -Trend troponin  -Consult cardiology    Hypothyroidism  Patient reports that she has not taken her levothyroxine on the day of presentation.   TSH 5.390 taken in the ED.  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 Patient follows with [facility] who have recommended to follow-up if she experiences symptoms of enlarging pelvic mass      Disposition:   Current Living situation: home with family  Expected Disposition: home with family  Estimated D/C: 2 days    Diet ADULT DIET; Regular   DVT Prophylaxis [x] Lovenox, []  Heparin, [] SCDs, [] Ambulation,  [] Eliquis, [] Xarelto, [] Coumadin   Code Status DNR-CCA   Surrogate Decision Maker/ POA -     History from:     patient    History of Present Illness:     Chief Complaint: Atrial fibrillation ([address]  Meryl Mccall is a 80 y.o. female with pmh of hypothyroidism, right ovarian cystic mass, mild cognitive impairment, who presents with atrial fibrillation in RVR. Patient presented to urgent care initially with complaints of cough and shortness of breath, and also weakness over the past 1 to 2 days. She was found to be tachycardic and was sent to the emergency where she was found to be in A. fib RVR. She does not have any prior history of arrhythmia. She was given diltiazem 10 Mg a total of 3 doses at 1:43 PM, 3:17 PM, and 6:47 PM.  She she appears to be rate controlled at the time of transfer from the emergency but she arrived in RVR again to the floor with a heart rate around 1 30-1 50. She does not report any symptoms of palpitation, chest pain, or shortness of breath to me. She does not appear to have any crackles on auscultation but her proBNP is elevated to 9000, no peripheral edema. She reports she did not take her levothyroxine this morning, but she is not overmedicated with TSH at 5.39. No complaints of fever, chills, rigors.   No complaints of abdominal pain, burning micturition, headache, focal neurological weaknesses. Review of Systems: Need 10 Elements   Review of Systems   Constitutional:  Negative for activity change, appetite change, chills, fatigue and fever. HENT:  Negative for congestion, ear discharge, ear pain, hearing loss, nosebleeds, postnasal drip, rhinorrhea, sinus pain, sore throat, trouble swallowing and voice change. Eyes:  Negative for photophobia, pain, discharge, redness and itching. Respiratory:  Positive for cough and shortness of breath. Negative for chest tightness. Cardiovascular:  Negative for chest pain, palpitations and leg swelling. Gastrointestinal:  Negative for abdominal distention, abdominal pain, blood in stool, constipation, diarrhea, nausea and vomiting. Endocrine: Negative for cold intolerance, heat intolerance, polydipsia, polyphagia and polyuria. Genitourinary:  Negative for difficulty urinating, dysuria, flank pain, frequency, hematuria and urgency. Musculoskeletal:  Negative for arthralgias, back pain, gait problem, joint swelling and myalgias. Skin:  Negative for pallor, rash and wound. Allergic/Immunologic: Negative for environmental allergies and food allergies. Neurological:  Negative for dizziness, tremors, seizures, syncope, speech difficulty, weakness, light-headedness, numbness and headaches. Hematological:  Negative for adenopathy. Does not bruise/bleed easily. Psychiatric/Behavioral:  Negative for agitation, confusion, decreased concentration, hallucinations, self-injury, sleep disturbance and suicidal ideas. The patient is not nervous/anxious and is not hyperactive. Objective:   No intake or output data in the 24 hours ending 01/14/23 9857   Vitals:   Vitals:       Medications Prior to Admission     Prior to Admission medications    Medication Sig Start Date End Date Taking?  Authorizing Provider   levothyroxine (SYNTHROID) 75 MCG tablet Take 1 tablet by mouth Daily 3/9/22   Case Rodriguez MD ipratropium-albuterol (DUONEB) 0.5-2.5 (3) MG/3ML SOLN nebulizer solution Inhale 3 mLs into the lungs every 4 hours as needed for Shortness of Breath (While awake (4 doses max per day)) 3/8/22 3/28/22  Sher Rangel MD   metoprolol succinate (TOPROL XL) 25 MG extended release tablet Take 12.5 mg by mouth daily     Historical Provider, MD   omeprazole (PRILOSEC) 20 MG capsule Take 20 mg by mouth daily    Historical Provider, MD       Physical Exam: Need 8 Elements   Physical Exam  Constitutional:       General: She is not in acute distress. Appearance: She is well-developed. HENT:      Head: Normocephalic and atraumatic. Right Ear: External ear normal.      Left Ear: External ear normal.      Nose: Nose normal.   Eyes:      General: No scleral icterus. Right eye: No discharge. Left eye: No discharge. Conjunctiva/sclera: Conjunctivae normal.      Pupils: Pupils are equal, round, and reactive to light. Neck:      Thyroid: No thyromegaly. Vascular: No JVD. Trachea: No tracheal deviation. Cardiovascular:      Rate and Rhythm: Tachycardia present. Rhythm irregular. Heart sounds: Normal heart sounds. No murmur heard. No friction rub. No gallop. Pulmonary:      Effort: Pulmonary effort is normal. No respiratory distress. Breath sounds: Normal breath sounds. No stridor. No wheezing or rales. Chest:      Chest wall: No tenderness. Abdominal:      General: Bowel sounds are normal. There is no distension. Palpations: Abdomen is soft. There is no mass. Tenderness: There is no abdominal tenderness. There is no guarding or rebound. Hernia: No hernia is present. Genitourinary:     Vagina: Normal. No vaginal discharge. Rectum: Guaiac result negative. Musculoskeletal:         General: No tenderness or deformity. Normal range of motion. Cervical back: Normal range of motion and neck supple.    Lymphadenopathy:      Cervical: No cervical adenopathy. Skin:     General: Skin is warm and dry. Capillary Refill: Capillary refill takes less than 2 seconds. Coloration: Skin is not pale. Findings: No erythema or rash. Neurological:      Mental Status: She is alert and oriented to person, place, and time. Cranial Nerves: No cranial nerve deficit. Motor: No abnormal muscle tone. Coordination: Coordination normal.      Deep Tendon Reflexes: Reflexes normal.   Psychiatric:         Behavior: Behavior normal.         Thought Content: Thought content normal.         Judgment: Judgment normal.         Past Medical History:   PMHx   Past Medical History:   Diagnosis Date    Arthritis     Thyroid disease      PSHX:  has a past surgical history that includes Tonsillectomy; Appendectomy; Cholecystectomy; Hysterectomy; eye surgery; and fracture surgery. Allergies: No Known Allergies  Fam HX:  family history is not on file. Soc HX:   Social History     Socioeconomic History    Marital status:    Tobacco Use    Smoking status: Never    Smokeless tobacco: Never   Substance and Sexual Activity    Alcohol use: No    Drug use: No    Sexual activity: Never       Medications:   Medications:    Infusions:   PRN Meds:     Labs      CBC:   Recent Labs     01/14/23  1315   WBC 10.0   HGB 14.0        BMP:    Recent Labs     01/14/23  1315      K 4.4      CO2 21   BUN 18   CREATININE 1.3*   GLUCOSE 118*     Hepatic:   Recent Labs     01/14/23  1315   AST 23   ALT 12   BILITOT 0.9   ALKPHOS 93     Lipids: No results found for: CHOL, HDL, TRIG  Hemoglobin A1C: No results found for: LABA1C  TSH: No results found for: TSH  Troponin:   Lab Results   Component Value Date/Time    TROPONINT 0.015 01/14/2023 01:15 PM    TROPONINT <0.010 09/01/2022 11:05 PM    TROPONINT <0.010 03/01/2022 01:30 PM     Lactic Acid: No results for input(s): LACTA in the last 72 hours.   BNP:   Recent Labs     01/14/23  1315   PROBNP 9,289*     UA:  Lab Results   Component Value Date/Time    NITRU NEGATIVE 11/16/2022 01:15 PM    COLORU YELLOW 11/16/2022 01:15 PM    WBCUA 15 09/01/2022 11:50 PM    RBCUA 2 09/01/2022 11:50 PM    MUCUS 2+ 01/12/2022 06:15 PM    BACTERIA FEW 09/01/2022 11:50 PM    CLARITYU CLEAR 11/16/2022 01:15 PM    SPECGRAV 1.025 11/16/2022 01:15 PM    LEUKOCYTESUR NEGATIVE 11/16/2022 01:15 PM    UROBILINOGEN 0.2 11/16/2022 01:15 PM    BILIRUBINUR NEGATIVE 11/16/2022 01:15 PM    BLOODU NEGATIVE 11/16/2022 01:15 PM    KETUA NEGATIVE 11/16/2022 01:15 PM     Urine Cultures: No results found for: LABURIN  Blood Cultures: No results found for: BC  No results found for: BLOODCULT2  Organism: No results found for: ORG    Imaging/Diagnostics Last 24 Hours   XR CHEST (2 VW)    Result Date: 1/14/2023  EXAMINATION: TWO XRAY VIEWS OF THE CHEST 1/14/2023 2:29 pm COMPARISON: November 16, 2022 HISTORY: ORDERING SYSTEM PROVIDED HISTORY: New onset Afib, Cough TECHNOLOGIST PROVIDED HISTORY: Reason for exam:->New onset Afib, Cough Reason for Exam: New onset Afib, Cough Additional signs and symptoms: New onset Afib, Cough, congestion FINDINGS: No evidence of active infiltrates to suggest pneumonia. Mild perihilar congestion but no evidence of active infiltrates. No evidence of pleural effusion or pneumothorax. Mild cardiomegaly. Mild cardiomegaly and perihilar congestion but no consolidative infiltrates or active pleural disease. Electronically signed by Lizz Armstrong MD on 1/14/2023 at 9:37 PM      Comment: Please note this report has been produced using speech recognition software and may contain errors related to that system including errors in grammar, punctuation, and spelling, as well as words and phrases that may be inappropriate. If there are any questions or concerns please feel free to contact the dictating provider for clarification.

## 2023-01-15 NOTE — PROGRESS NOTES
4 Eyes Skin Assessment     NAME:  Gaston Green  YOB: 1931  MEDICAL RECORD NUMBER:  [id number]    The patient is being assessed for  Admission    I agree that One RN have performed a thorough Head to Toe Skin Assessment on the patient. ALL assessment sites listed below have been assessed. Areas assessed by both nurses:    Assessed all parts of the body        Does the Patient have a Wound?  Other dry wound old scab in left knee, skin peel in left shin       Lamont Prevention initiated by RN: Yes   Wound Care Orders initiated by RN: No    Pressure Injury (Stage 3,4, Unstageable, DTI, NWPT, and Complex wounds) if present place referral order by RN under : Yes    New and Established Ostomies, if present place, referral order under : NA      Nurse 1 eSignature: Electronically signed by Loraine Kothari RN on 1/15/23 at 12:38 AM EST    **SHARE this note so that the co-signing nurse is able to place an eSignature**    Nurse 2 eSignature: {Esignature:[id number]}

## 2023-01-15 NOTE — PROGRESS NOTES
V2.0  [facility] [place] [facility] Hospitalist Progress Note      Name:  Pradeep Yoon /Age/Sex: 3/26/1931  (80 y.o. female)   MRN & CSN:  [id number] & [id number] Encounter Date/Time: 1/15/2023 10:28 AM EST    Location:  2804/9293-U PCP: Ashleigh  Day: 2    Assessment and Plan:   Pradeep Yoon is a 80 y.o. female with who presents with Atrial fibrillation ([address]    A. fib with RVR  -New onset. Did not respond very well to IV boluses of AV blocking medications. Started on diltiazem drip. Heart rate under better control now.  -Home metoprolol dose has been increased. -CHADS2 Vascc score of 3 started on Lovenox 1 alina per KG twice daily. Echocardiogram ordered. Cardiology consulted. Plan on transitioning to oral anticoagulation within 24 hours. Shortness of breath  -Concern for decompensated heart failure due to A. fib. Echocardiogram is pending. Did receive 20 mg of IV Lasix yesterday and had decent urine output. Somewhat improved. Awaiting echocardiogram.    NSTEMI  -Likely in the setting of demand due to tachycardia  -Trend troponin. Patient does not have chest pain    Hypothyroidism  -Continue home Synthroid 75 mcg. TSH is 5.39. CKD stage III  -Creatinine at baseline. Continue to monitor. Mild cognitive impairment  -Delirium precautions    Right ovarian mass  -Follows with [facility]. As per patient and family no plans to pursue treatment given age and comorbidities. Comment: Please note this report has been produced using speech recognition software and may contain errors related to that system including errors in grammar, punctuation, and spelling, as well as words and phrases that may be inappropriate. If there are any questions or concerns please feel free to contact the dictating provider for clarification. Diet ADULT DIET;  Regular   DVT Prophylaxis [x] Lovenox, []  Heparin, [] SCDs, [] Ambulation,  [] Eliquis, [] Xarelto  [] Coumadin   Code Status DNR-CCA   Disposition From: Home with family  Expected Disposition: Home with family  Estimated Date of Discharge: 2 to 3 days  Patient requires continued admission due to A. fib RVR   Surrogate Decision Maker/ POA Niece     Subjective:     Chief Complaint: Shortness of breath    Feels okay. Some report improvement in shortness of breath. Denies any chest pain. Denies any lightheadedness dizziness. Denies any nausea or vomiting. Review of Systems:    Review of Systems  As per interval history    Objective: Intake/Output Summary (Last 24 hours) at 1/15/2023 1028  Last data filed at 1/15/2023 0600  Gross per 24 hour   Intake 50 ml   Output 600 ml   Net -550 ml        Vitals:   Vitals:    01/15/23 0845   BP: 136/76   Pulse: (!) 101   Resp: 23   Temp: 98.6 °F (37 °C)   SpO2: 95%       Physical Exam:   Physical Exam  Constitutional:       General: She is not in acute distress. HENT:      Mouth/Throat:      Mouth: Mucous membranes are moist.      Pharynx: No posterior oropharyngeal erythema. Eyes:      General: No scleral icterus. Pupils: Pupils are equal, round, and reactive to light. Cardiovascular:      Rate and Rhythm: Tachycardia present. Rhythm irregular. Heart sounds: No murmur heard. Pulmonary:      Effort: Pulmonary effort is normal.      Breath sounds: Rales (Bibasilar) present. No wheezing. Abdominal:      Palpations: Abdomen is soft. Tenderness: There is no abdominal tenderness. Musculoskeletal:         General: Normal range of motion. Right lower leg: No edema. Left lower leg: No edema. Skin:     General: Skin is warm. Neurological:      General: No focal deficit present. Mental Status: She is alert. She is disoriented. Cranial Nerves: No cranial nerve deficit. Motor: No weakness. Comments: Alert oriented to person. Disoriented to place and situation.    Psychiatric:         Mood and Affect: Mood normal.       Medications:   Medications:    levothyroxine  75 mcg Oral Daily    pantoprazole  40 mg Oral QAM AC    sodium chloride flush  5-40 mL IntraVENous 2 times per day    enoxaparin  1 mg/kg SubCUTAneous Daily    metoprolol succinate  25 mg Oral Daily      Infusions:    sodium chloride      dilTIAZem 5 mg/hr (01/14/23 3676)     PRN Meds: ipratropium-albuterol, 1 vial, Q4H PRN  sodium chloride flush, 5-40 mL, PRN  sodium chloride, , PRN  ondansetron, 4 mg, Q8H PRN   Or  ondansetron, 4 mg, Q6H PRN  polyethylene glycol, 17 g, Daily PRN  acetaminophen, 650 mg, Q6H PRN   Or  acetaminophen, 650 mg, Q6H PRN        Labs      Recent Results (from the past 24 hour(s))   CBC with Auto Differential    Collection Time: 01/14/23  1:15 PM   Result Value Ref Range    WBC 10.0 4.0 - 10.5 K/CU MM    RBC 4.69 4.2 - 5.4 M/CU MM    Hemoglobin 14.0 12.5 - 16.0 GM/DL    Hematocrit 43.3 37 - 47 %    MCV 92.3 78 - 100 FL    MCH 29.9 27 - 31 PG    MCHC 32.3 32.0 - 36.0 %    RDW 14.6 11.7 - 14.9 %    Platelets 529 473 - 326 K/CU MM    MPV 10.4 7.5 - 11.1 FL    Differential Type AUTOMATED DIFFERENTIAL     Segs Relative 67.5 (H) 36 - 66 %    Lymphocytes % 18.7 (L) 24 - 44 %    Monocytes % 12.3 (H) 0 - 4 %    Eosinophils % 0.4 0 - 3 %    Basophils % 0.7 0 - 1 %    Segs Absolute 6.8 K/CU MM    Lymphocytes Absolute 1.9 K/CU MM    Monocytes Absolute 1.2 K/CU MM    Eosinophils Absolute 0.0 K/CU MM    Basophils Absolute 0.1 K/CU MM    Immature Neutrophil % 0.4 0 - 0.43 %    Total Immature Neutrophil 0.04 K/CU MM   CMP    Collection Time: 01/14/23  1:15 PM   Result Value Ref Range    Sodium 143 135 - 145 MMOL/L    Potassium 4.4 3.5 - 5.1 MMOL/L    Chloride 106 99 - 110 mMol/L    CO2 21 21 - 32 MMOL/L    BUN 18 6 - 23 MG/DL    Creatinine 1.3 (H) 0.6 - 1.1 MG/DL    Est, Glom Filt Rate 39 (L) >60 mL/min/1.73m2    Glucose 118 (H) 70 - 99 MG/DL    Calcium 8.9 8.3 - 10.6 MG/DL    Albumin 3.9 3.4 - 5.0 GM/DL    Total Protein 7.0 6.4 - 8.2 GM/DL    Total Bilirubin 0.9 0.0 - 1.0 MG/DL    ALT 12 10 - 40 U/L    AST 23 15 - 37 IU/L    Alkaline Phosphatase 93 40 - 129 IU/L    Anion Gap 16 4 - 16   Magnesium    Collection Time: 01/14/23  1:15 PM   Result Value Ref Range    Magnesium 2.0 1.8 - 2.4 mg/dl   Protime-INR    Collection Time: 01/14/23  1:15 PM   Result Value Ref Range    Protime 14.9 (H) 11.7 - 14.5 SECONDS    INR 1.23 INDEX   Troponin    Collection Time: 01/14/23  1:15 PM   Result Value Ref Range    Troponin T 0.015 (H) <0.01 NG/ML   TSH    Collection Time: 01/14/23  1:15 PM   Result Value Ref Range    TSH, High Sensitivity 5.390 (H) 0.270 - 4.20 uIu/ml   T4, Free    Collection Time: 01/14/23  1:15 PM   Result Value Ref Range    T4 Free 1.52 0.9 - 1.8 NG/DL   Brain Natriuretic Peptide    Collection Time: 01/14/23  1:15 PM   Result Value Ref Range    Pro-BNP 9,289 (H) <300 PG/ML   EKG 12 Lead    Collection Time: 01/14/23  1:24 PM   Result Value Ref Range    Ventricular Rate 153 BPM    Atrial Rate 159 BPM    QRS Duration 96 ms    Q-T Interval 286 ms    QTc Calculation (Bazett) 456 ms    R Axis -48 degrees    T Axis 120 degrees    Diagnosis       Atrial fibrillation with rapid ventricular response  Left axis deviation  Inferior infarct (cited on or before 12-JAN-2022)  Anteroseptal infarct (cited on or before 12-JAN-2022)  ST & T wave abnormality, consider lateral ischemia  Abnormal ECG  When compared with ECG of 16-NOV-2022 13:27,  Atrial fibrillation has replaced Sinus rhythm  Vent.  rate has increased BY  84 BPM  T wave inversion less evident in Anterolateral leads     COVID-19, Rapid    Collection Time: 01/14/23  1:30 PM    Specimen: Nasopharyngeal   Result Value Ref Range    Source UNKNOWN     SARS-CoV-2, NAAT NOT DETECTED NOT DETECTED   Rapid Flu Swab    Collection Time: 01/14/23  1:30 PM    Specimen: Nasopharyngeal   Result Value Ref Range    Rapid Influenza A Ag NEGATIVE NEGATIVE    Rapid Influenza B Ag NEGATIVE NEGATIVE   Troponin    Collection Time: 01/14/23  9:20 PM   Result Value Ref Range    Troponin T 0.018 (H) <0.01 NG/ML   Basic Metabolic Panel    Collection Time: 01/15/23  8:32 AM   Result Value Ref Range    Sodium 141 135 - 145 MMOL/L    Potassium 3.9 3.5 - 5.1 MMOL/L    Chloride 107 99 - 110 mMol/L    CO2 23 21 - 32 MMOL/L    Anion Gap 11 4 - 16    BUN 17 6 - 23 MG/DL    Creatinine 1.3 (H) 0.6 - 1.1 MG/DL    Est, Glom Filt Rate 39 (L) >60 mL/min/1.73m2    Glucose 87 70 - 99 MG/DL    Calcium 8.0 (L) 8.3 - 10.6 MG/DL   Magnesium    Collection Time: 01/15/23  8:32 AM   Result Value Ref Range    Magnesium 1.8 1.8 - 2.4 mg/dl   Phosphorus    Collection Time: 01/15/23  8:32 AM   Result Value Ref Range    Phosphorus 3.4 2.5 - 4.9 MG/DL   CBC with Auto Differential    Collection Time: 01/15/23  8:32 AM   Result Value Ref Range    WBC 6.3 4.0 - 10.5 K/CU MM    RBC 4.00 (L) 4.2 - 5.4 M/CU MM    Hemoglobin 11.9 (L) 12.5 - 16.0 GM/DL    Hematocrit 37.1 37 - 47 %    MCV 92.8 78 - 100 FL    MCH 29.8 27 - 31 PG    MCHC 32.1 32.0 - 36.0 %    RDW 14.4 11.7 - 14.9 %    Platelets 248 612 - 419 K/CU MM    MPV 10.8 7.5 - 11.1 FL    Differential Type AUTOMATED DIFFERENTIAL     Segs Relative 53.2 36 - 66 %    Lymphocytes % 30.3 24 - 44 %    Monocytes % 12.8 (H) 0 - 4 %    Eosinophils % 2.6 0 - 3 %    Basophils % 0.8 0 - 1 %    Segs Absolute 3.3 K/CU MM    Lymphocytes Absolute 1.9 K/CU MM    Monocytes Absolute 0.8 K/CU MM    Eosinophils Absolute 0.2 K/CU MM    Basophils Absolute 0.1 K/CU MM    Nucleated RBC % 0.0 %    Total Nucleated RBC 0.0 K/CU MM    Total Immature Neutrophil 0.02 K/CU MM    Immature Neutrophil % 0.3 0 - 0.43 %        Imaging/Diagnostics Last 24 Hours   XR CHEST (2 VW)    Result Date: 1/14/2023  EXAMINATION: TWO XRAY VIEWS OF THE CHEST 1/14/2023 2:29 pm COMPARISON: November 16, 2022 HISTORY: ORDERING SYSTEM PROVIDED HISTORY: New onset Afib, Cough TECHNOLOGIST PROVIDED HISTORY: Reason for exam:->New onset Afib, Cough Reason for Exam: New onset Afib, Cough Additional signs and symptoms: New onset Afib, Cough, congestion FINDINGS: No evidence of active infiltrates to suggest pneumonia. Mild perihilar congestion but no evidence of active infiltrates. No evidence of pleural effusion or pneumothorax. Mild cardiomegaly. Mild cardiomegaly and perihilar congestion but no consolidative infiltrates or active pleural disease.        Electronically signed by Shaun Harp MD on 1/15/2023 at 10:28 AM

## 2023-01-15 NOTE — PLAN OF CARE
Problem: Discharge Planning  Goal: Discharge to home or other facility with appropriate resources  1/15/2023 1155 by Lola Nicolas RN  Outcome: Progressing  Flowsheets (Taken 1/15/2023 0845)  Discharge to home or other facility with appropriate resources:   Identify barriers to discharge with patient and caregiver   Arrange for needed discharge resources and transportation as appropriate   Identify discharge learning needs (meds, wound care, etc)   Arrange for interpreters to assist at discharge as needed   Refer to discharge planning if patient needs post-hospital services based on physician order or complex needs related to functional status, cognitive ability or social support system  1/15/2023 0420 by Johnny Soto RN  Outcome: Progressing  1/15/2023 0420 by Johnny Soto RN  Outcome: Progressing     Problem: Safety - Adult  Goal: Free from fall injury  1/15/2023 1155 by Lola Nicolas RN  Outcome: Progressing  1/15/2023 0420 by Johnny Soto RN  Outcome: Progressing  1/15/2023 0420 by Johnny Soto RN  Outcome: Progressing     Problem: ABCDS Injury Assessment  Goal: Absence of physical injury  1/15/2023 1155 by Lola Nicolas RN  Outcome: Progressing  1/15/2023 0420 by Johnny Soto RN  Outcome: Progressing  1/15/2023 0420 by Johnny Soto RN  Outcome: Progressing

## 2023-01-15 NOTE — CONSULTS
INPATIENT CARDIOLOGY CONSULT NOTE         Reason for consultation:   AFIB      Primary care physician: Sukhjinder De La Cruz DO            History of present Truman Merino is a 80 y. o.year old who  presents with coughing    Patient is noted to have new onset atrial fibrillation on admission. Cardiology is consulted to evaluate patient. Patient is a 63-year-old female who lives with her niece. She has been recently diagnosed with a ovarian cyst, and has deferred further investigation in light of advanced age. Patient also has history of mild dementia and is otherwise fairly active. For the past several days patient has been feeling generalized weakness with tachycardia and coughing. Upon arrival to the hospital she was noted to have new onset atrial fibrillation with rapid ventricular response. She was appropriately started on IV Cardizem drip. Patient denies any prior established history of CAD CHF or arrhythmia      Past medical history:    has a past medical history of Arthritis and Thyroid disease. Past surgical history:   has a past surgical history that includes Tonsillectomy; Appendectomy; Cholecystectomy; Hysterectomy; eye surgery; and fracture surgery. Social History:   reports that she has never smoked. She has never used smokeless tobacco. She reports that she does not drink alcohol and does not use drugs.   Family history:   no family history of CAD, STROKE of DM    No Known Allergies    ipratropium-albuterol (DUONEB) nebulizer solution 3 mL, Q4H PRN  levothyroxine (SYNTHROID) tablet 75 mcg, Daily  pantoprazole (PROTONIX) tablet 40 mg, QAM AC  sodium chloride flush 0.9 % injection 5-40 mL, 2 times per day  sodium chloride flush 0.9 % injection 5-40 mL, PRN  0.9 % sodium chloride infusion, PRN  enoxaparin (LOVENOX) injection 50 mg, Daily  ondansetron (ZOFRAN-ODT) disintegrating tablet 4 mg, Q8H PRN   Or  ondansetron (ZOFRAN) injection 4 mg, Q6H PRN  polyethylene glycol (GLYCOLAX) packet 17 g, Daily PRN  acetaminophen (TYLENOL) tablet 650 mg, Q6H PRN   Or  acetaminophen (TYLENOL) suppository 650 mg, Q6H PRN  dilTIAZem 100 mg in dextrose 5 % 100 mL infusion (ADD-[place]), Continuous  metoprolol succinate (TOPROL XL) extended release tablet 25 mg, Daily      Current Facility-Administered Medications   Medication Dose Route Frequency Provider Last Rate Last Admin    ipratropium-albuterol (DUONEB) nebulizer solution 3 mL  1 vial Inhalation Q4H PRN Maury Hightower MD        levothyroxine (SYNTHROID) tablet 75 mcg  75 mcg Oral Daily Maury Hightower MD   75 mcg at 01/15/23 0544    pantoprazole (PROTONIX) tablet 40 mg  40 mg Oral QAM AC Maury Hightower MD   40 mg at 01/15/23 0544    sodium chloride flush 0.9 % injection 5-40 mL  5-40 mL IntraVENous 2 times per day Maury Hightower MD   10 mL at 01/14/23 2159    sodium chloride flush 0.9 % injection 5-40 mL  5-40 mL IntraVENous PRN Maury Hightower MD        0.9 % sodium chloride infusion   IntraVENous PRN Maury Hightower MD        enoxaparin (LOVENOX) injection 50 mg  1 mg/kg SubCUTAneous Daily Maury Hightower MD   50 mg at 01/14/23 2159    ondansetron (ZOFRAN-ODT) disintegrating tablet 4 mg  4 mg Oral Q8H PRN Maury Hightower MD        Or    ondansetron (ZOFRAN) injection 4 mg  4 mg IntraVENous Q6H PRN Maury Hightower MD        polyethylene glycol (GLYCOLAX) packet 17 g  17 g Oral Daily PRN Maury Hightower MD        acetaminophen (TYLENOL) tablet 650 mg  650 mg Oral Q6H PRN Maury Hightower MD        Or    acetaminophen (TYLENOL) suppository 650 mg  650 mg Rectal Q6H PRN Maury Hightower MD        dilTIAZem 100 mg in dextrose 5 % 100 mL infusion (ADD-[place])  2.5-15 mg/hr IntraVENous Continuous Maury Hightower MD 2.5 mL/hr at 01/15/23 1221 2.5 mg/hr at 01/15/23 1221    metoprolol succinate (TOPROL XL) extended release tablet 25 mg  25 mg Oral Daily Maury Hightower MD   25 mg at 01/14/23 3113         Review of Systems:      Constitutional: No Fever or Weight Loss   Eyes: No Decreased Vision  ENT: No Headaches, Hearing Loss or Vertigo  Cardiovascular:   no chest pain,  no dyspnea on exertion,  no palpitations or loss of consciousness  Respiratory: No cough or wheezing    Gastrointestinal: No abdominal pain, appetite loss, blood in stools, constipation, diarrhea or heartburn  Genitourinary: No dysuria, trouble voiding, or hematuria  Musculoskeletal:  No gait disturbance, weakness or joint complaints  Integumentary: No rash or pruritis  Neurological: No TIA or stroke symptoms  Psychiatric: No anxiety or depression  Endocrine: No malaise, fatigue or temperature intolerance  Hematologic/Lymphatic: No bleeding problems, blood clots or swollen lymph nodes  Allergic/Immunologic: No nasal congestion or hives    All other systems were reviewed and were negative otherwise. Physical Examination:      Vitals:    01/15/23 1100   BP: 127/84   Pulse: 85   Resp: 16   Temp: 98.7 °F (37.1 °C)   SpO2: 94%      Wt Readings from Last 3 Encounters:   01/14/23 120 lb 1.6 oz (54.5 kg)   01/14/23 110 lb (49.9 kg)   11/16/22 105 lb (47.6 kg)     Body mass index is 24.26 kg/m². General Appearance:  No distress, conversant  Constitutional:  Well developed, Well nourished  HEENT:  Normocephalic, Atraumatic, Oropharynx moist   Nose normal. Neck Supple Carotid: no carotid bruit  Eyes:  Conjunctiva normal, No discharge. Respiratory:    Normal breath sounds, No respiratory distress, No wheezing, no use of accessory muscles, diaphragm movement is normal  No chest Tenderness  Cardiovascular: S1-S2 IRIR No rubs, thrills or gallops. Normal  rhythm. Pedal pulses are normal. No pedal edema  GI:  Soft Non tender, non distended. Musculoskeletal:   No tenderness, No cyanosis, No clubbing. Integument:  Warm, Dry, No erythema, No rash. Lymphatic:  No lymphadenopathy noted. Neurologic:  Alert & oriented x 3  No focal deficits noted.    Psychiatric:  Affect normal, Judgment normal, Mood normal.       Lab Review     Recent Labs     01/15/23  0832   WBC 6.3   HGB 11.9*   HCT 37.1         Recent Labs     01/15/23  0832      K 3.9      CO2 23   PHOS 3.4   BUN 17   CREATININE 1.3*     Recent Labs     01/14/23  1315   AST 23   ALT 12   BILITOT 0.9   ALKPHOS 93     No results for input(s): TROPONINI in the last 72 hours. No results found for: BNP  Lab Results   Component Value Date    INR 1.23 01/14/2023    PROTIME 14.9 (H) 01/14/2023         All labs, images, EKGs were personally reviewed      Assessment: 80 y. o.year old with PMH of  has a past medical history of Arthritis and Thyroid disease. Medical Decision Making :       New onset atrial fibrillation with rapid ventricular response    Discussed treatment options with the patient as well as with family including cardioversion and future ablations if needed. Will be conservative and managing at the start, aggressive if needed be. [id number] +5  Start patient on low-dose oral anticoagulation with Eliquis 2.5 mg p.o. twice daily,  For stroke prophylaxis    Continue with Toprol-XL 25 daily  Start patient on oral Cardizem 30 mg 4 times daily while weaning off IV Cardizem. Eventually patient will need long acting Cardizem. TSH is high with normal T4. Consult endocrinology    Echocardiogram in the morning    Essential hypertension: Blood pressure stable. Continue with Toprol-XL and Cardizem  Elevated troponin: Demand ischemia in the setting of A. fib RVR. No ischemic work-up planned. Echocardiogram in the morning  Elevated proBNP again in the setting of A. fib RVR with likely underlying diastolic heart failure. Check echo in the morning.   Currently euvolemic off Lasix           Thank you for the consult    Dr. Feldman   1/15/2023 1:28 PM

## 2023-01-16 ENCOUNTER — APPOINTMENT (OUTPATIENT)
Dept: ULTRASOUND IMAGING | Age: 88
DRG: 309 | End: 2023-01-16
Attending: INTERNAL MEDICINE
Payer: MEDICARE

## 2023-01-16 LAB
ANION GAP SERPL CALCULATED.3IONS-SCNC: 10 MMOL/L (ref 4–16)
BASOPHILS ABSOLUTE: 0.1 K/CU MM
BASOPHILS RELATIVE PERCENT: 0.7 % (ref 0–1)
BUN BLDV-MCNC: 20 MG/DL (ref 6–23)
CALCIUM SERPL-MCNC: 8.2 MG/DL (ref 8.3–10.6)
CHLORIDE BLD-SCNC: 112 MMOL/L (ref 99–110)
CO2: 23 MMOL/L (ref 21–32)
CREAT SERPL-MCNC: 1.3 MG/DL (ref 0.6–1.1)
DIFFERENTIAL TYPE: ABNORMAL
EOSINOPHILS ABSOLUTE: 0.3 K/CU MM
EOSINOPHILS RELATIVE PERCENT: 3.4 % (ref 0–3)
GFR SERPL CREATININE-BSD FRML MDRD: 39 ML/MIN/1.73M2
GLUCOSE BLD-MCNC: 105 MG/DL (ref 70–99)
HCT VFR BLD CALC: 37.6 % (ref 37–47)
HEMOGLOBIN: 12.1 GM/DL (ref 12.5–16)
IMMATURE NEUTROPHIL %: 0.3 % (ref 0–0.43)
LV EF: 48 %
LVEF MODALITY: NORMAL
LYMPHOCYTES ABSOLUTE: 2.4 K/CU MM
LYMPHOCYTES RELATIVE PERCENT: 32.1 % (ref 24–44)
MAGNESIUM: 2 MG/DL (ref 1.8–2.4)
MCH RBC QN AUTO: 29.6 PG (ref 27–31)
MCHC RBC AUTO-ENTMCNC: 32.2 % (ref 32–36)
MCV RBC AUTO: 91.9 FL (ref 78–100)
MONOCYTES ABSOLUTE: 1 K/CU MM
MONOCYTES RELATIVE PERCENT: 13.2 % (ref 0–4)
NUCLEATED RBC %: 0 %
PDW BLD-RTO: 14.3 % (ref 11.7–14.9)
PHOSPHORUS: 3.5 MG/DL (ref 2.5–4.9)
PLATELET # BLD: 156 K/CU MM (ref 140–440)
PMV BLD AUTO: 10.5 FL (ref 7.5–11.1)
POTASSIUM SERPL-SCNC: 4.1 MMOL/L (ref 3.5–5.1)
RBC # BLD: 4.09 M/CU MM (ref 4.2–5.4)
SEGMENTED NEUTROPHILS ABSOLUTE COUNT: 3.8 K/CU MM
SEGMENTED NEUTROPHILS RELATIVE PERCENT: 50.3 % (ref 36–66)
SODIUM BLD-SCNC: 145 MMOL/L (ref 135–145)
TOTAL IMMATURE NEUTOROPHIL: 0.02 K/CU MM
TOTAL NUCLEATED RBC: 0 K/CU MM
WBC # BLD: 7.6 K/CU MM (ref 4–10.5)

## 2023-01-16 PROCEDURE — 6370000000 HC RX 637 (ALT 250 FOR IP)

## 2023-01-16 PROCEDURE — 84100 ASSAY OF PHOSPHORUS: CPT

## 2023-01-16 PROCEDURE — 6370000000 HC RX 637 (ALT 250 FOR IP): Performed by: INTERNAL MEDICINE

## 2023-01-16 PROCEDURE — 83735 ASSAY OF MAGNESIUM: CPT

## 2023-01-16 PROCEDURE — 2580000003 HC RX 258: Performed by: INTERNAL MEDICINE

## 2023-01-16 PROCEDURE — 80048 BASIC METABOLIC PNL TOTAL CA: CPT

## 2023-01-16 PROCEDURE — APPSS30 APP SPLIT SHARED TIME 16-30 MINUTES

## 2023-01-16 PROCEDURE — 36415 COLL VENOUS BLD VENIPUNCTURE: CPT

## 2023-01-16 PROCEDURE — 85025 COMPLETE CBC W/AUTO DIFF WBC: CPT

## 2023-01-16 PROCEDURE — 76536 US EXAM OF HEAD AND NECK: CPT

## 2023-01-16 PROCEDURE — 2700000000 HC OXYGEN THERAPY PER DAY

## 2023-01-16 PROCEDURE — 93306 TTE W/DOPPLER COMPLETE: CPT

## 2023-01-16 PROCEDURE — 99233 SBSQ HOSP IP/OBS HIGH 50: CPT | Performed by: INTERNAL MEDICINE

## 2023-01-16 PROCEDURE — 1200000000 HC SEMI PRIVATE

## 2023-01-16 PROCEDURE — 6370000000 HC RX 637 (ALT 250 FOR IP): Performed by: FAMILY MEDICINE

## 2023-01-16 PROCEDURE — 94761 N-INVAS EAR/PLS OXIMETRY MLT: CPT

## 2023-01-16 RX ORDER — FUROSEMIDE 20 MG/1
20 TABLET ORAL EVERY OTHER DAY
Status: DISCONTINUED | OUTPATIENT
Start: 2023-01-16 | End: 2023-01-17 | Stop reason: HOSPADM

## 2023-01-16 RX ORDER — DILTIAZEM HYDROCHLORIDE 180 MG/1
180 CAPSULE, COATED, EXTENDED RELEASE ORAL DAILY
Status: DISCONTINUED | OUTPATIENT
Start: 2023-01-16 | End: 2023-01-17 | Stop reason: HOSPADM

## 2023-01-16 RX ADMIN — Medication 3 MG: at 22:30

## 2023-01-16 RX ADMIN — APIXABAN 2.5 MG: 2.5 TABLET, FILM COATED ORAL at 21:15

## 2023-01-16 RX ADMIN — DILTIAZEM HYDROCHLORIDE 30 MG: 30 TABLET, FILM COATED ORAL at 12:36

## 2023-01-16 RX ADMIN — Medication 5 ML: at 21:16

## 2023-01-16 RX ADMIN — APIXABAN 2.5 MG: 2.5 TABLET, FILM COATED ORAL at 10:08

## 2023-01-16 RX ADMIN — DILTIAZEM HYDROCHLORIDE 180 MG: 180 CAPSULE, COATED, EXTENDED RELEASE ORAL at 16:03

## 2023-01-16 RX ADMIN — METOPROLOL TARTRATE 12.5 MG: 25 TABLET, FILM COATED ORAL at 21:15

## 2023-01-16 RX ADMIN — Medication 10 ML: at 12:39

## 2023-01-16 RX ADMIN — DILTIAZEM HYDROCHLORIDE 30 MG: 30 TABLET, FILM COATED ORAL at 06:21

## 2023-01-16 RX ADMIN — LEVOTHYROXINE SODIUM 75 MCG: 0.07 TABLET ORAL at 06:21

## 2023-01-16 RX ADMIN — PANTOPRAZOLE SODIUM 40 MG: 40 TABLET, DELAYED RELEASE ORAL at 06:21

## 2023-01-16 RX ADMIN — METOPROLOL TARTRATE 12.5 MG: 25 TABLET, FILM COATED ORAL at 16:03

## 2023-01-16 RX ADMIN — FUROSEMIDE 20 MG: 20 TABLET ORAL at 18:48

## 2023-01-16 NOTE — CARE COORDINATION
Case Management Assessment  Initial Evaluation    Date/Time of Evaluation: 1/16/2023 3:55 PM  Assessment Completed by: Deannie Lesch, RN    If patient is discharged prior to next notation, then this note serves as note for discharge by case management. Patient Name: Emma Espinosa                   YOB: 1931  Diagnosis: Atrial fibrillation [facility]) [I48.91]                   Date / Time: 1/14/2023  7:53 PM    Patient Admission Status: Inpatient   Readmission Risk (Low < 19, Mod (19-27), High > 27): Readmission Risk Score: 15.2    Current PCP: Nacho Alfredo, DO  PCP verified by CM? Yes    Chart Reviewed: Yes      History Provided by: Child/Family (Niece/Vendal)  Patient Orientation: Self    Patient Cognition: Short Term Memory Deficit    Hospitalization in the last 30 days (Readmission):  No    If yes, Readmission Assessment in  Navigator will be completed. Advance Directives:      Code Status: DNR-CCA   Patient's Primary Decision Maker is:      Primary Decision Maker: Thor Haley - Niece/Nephew - [phone]    Discharge Planning:    Patient lives with: Other (Comment) Type of Home: House  Primary Care Giver: Family  Patient Support Systems include: Family Members   Current Financial resources: Medicare  Current community resources:    Current services prior to admission: None            Current DME:              Type of Home Care services:  None    ADLS  Prior functional level: Independent in ADLs/IADLs  Current functional level:      PT AM-PAC:   /24  OT AM-PAC:   /24    Family can provide assistance at DC: Yes  Would you like Case Management to discuss the discharge plan with any other family members/significant others, and if so, who?  No  Plans to Return to Present Housing: Yes  Other Identified Issues/Barriers to RETURNING to current housing: no  Potential Assistance needed at discharge: Home Care            Potential DME:    Patient expects to discharge to: [address] [place] [address] transportation at discharge: Family    Financial    Payor: Jeane Carl / Plan: Son Degroot / Product Type: *No Product type* /     Does insurance require precert for SNF: Yes    Potential assistance Purchasing Medications: No  Meds-to-Beds request: Yes      [id number], [address]  Phone: [phone] Fax: [phone]      Notes:    Factors facilitating achievement of predicted outcomes: Family support and Caregiver support    Barriers to discharge: Confusion    Additional Case Management Notes: D/c plan is home with niece/Vendal and HHC. Niece to notify CM of [address] choice. Denies any other d/c needs. TE    The Plan for Transition of Care is related to the following treatment goals of Atrial fibrillation ([address] [G36.25]    IF APPLICABLE: The Patient and/or patient representative Pricila Cruz and her family were provided with a choice of provider and agrees with the discharge plan.  Freedom of choice list with basic dialogue that supports the patient's individualized plan of care/goals and shares the quality data associated with the providers was provided to: Patient Representative   Patient Representative Name: Sher Carbajal     The Patient and/or Patient Representative Agree with the Discharge Plan?  yes    Keren Valentin RN  Case Management Department  Ph: [phone] Fax:

## 2023-01-16 NOTE — PROGRESS NOTES
V2.0  [facility] Hospitalist Progress Note      Name:  Sherine Brown /Age/Sex: 3/26/1931  (80 y.o. female)   MRN & CSN:  [id number] & [id number] Encounter Date/Time: 2023 10:28 AM EST    Location:  0192/4899-K PCP: Ashleigh  Day: 3    Assessment and Plan:   Sherine Brown is a 80 y.o. female with who presents with Atrial fibrillation ([address]    A. fib with RVR  -New onset. Did not respond very well to IV boluses of AV blocking medications. Started on diltiazem drip then transition to p.o. Heart rate better controlled now. Home metoprolol restarted. -CHADS2 Vascc score of 3 started on Lovenox 1 alina per KG twice daily, now transition to oral anticoagulation with Eliquis. .  Echocardiogram ordered. Cardiology consulted. Shortness of breath  -Concern for decompensated heart failure due to A. fib. Echocardiogram is pending. Did receive 20 mg of IV Lasix yesterday and had decent urine output. Somewhat improved. Awaiting echocardiogram.    NSTEMI  -Likely in the setting of demand due to tachycardia  -Trend troponin. Patient does not have chest pain    Hypothyroidism  -Continue home Synthroid 75 mcg. TSH is 5.39.  -Endocrinology was consulted by cardiology given abnormal TFTs. Recommended continuing Synthroid at current dose. Ultrasound of the echo was ordered which is pending. CKD stage III  -Creatinine at baseline. Continue to monitor. Mild cognitive impairment  -Delirium precautions    Right ovarian mass  -Follows with [facility]. As per patient and family no plans to pursue treatment given age and comorbidities. Comment: Please note this report has been produced using speech recognition software and may contain errors related to that system including errors in grammar, punctuation, and spelling, as well as words and phrases that may be inappropriate. If there are any questions or concerns please feel free to contact the dictating provider for clarification. Diet ADULT DIET; Regular   DVT Prophylaxis [x] Lovenox, []  Heparin, [] SCDs, [] Ambulation,  [] Eliquis, [] Xarelto  [] Coumadin   Code Status DNR-CCA   Disposition From: Home with family  Expected Disposition: Home with family  Estimated Date of Discharge: 2 to 3 days  Patient requires continued admission due to A. fib RVR   Surrogate Decision Maker/ POA Niece     Subjective:     Chief Complaint: Shortness of breath    Feels okay. Denies any chest pain. Denies any lightheadedness dizziness. Denies any nausea or vomiting. Review of Systems:    Review of Systems  As per interval history    Objective: Intake/Output Summary (Last 24 hours) at 1/16/2023 1113  Last data filed at 1/16/2023 0500  Gross per 24 hour   Intake 180 ml   Output 430 ml   Net -250 ml          Vitals:   Vitals:    01/16/23 1000   BP: 132/73   Pulse: 88   Resp: 17   Temp: 97.4 °F (36.3 °C)   SpO2: 98%       Physical Exam:   Physical Exam  Constitutional:       General: She is not in acute distress. HENT:      Mouth/Throat:      Mouth: Mucous membranes are moist.      Pharynx: No posterior oropharyngeal erythema. Eyes:      General: No scleral icterus. Pupils: Pupils are equal, round, and reactive to light. Cardiovascular:      Rate and Rhythm: Normal rate. Rhythm irregular. Heart sounds: No murmur heard. Pulmonary:      Effort: Pulmonary effort is normal.      Breath sounds: Rales (Bibasilar) present. No wheezing. Abdominal:      Palpations: Abdomen is soft. Tenderness: There is no abdominal tenderness. Musculoskeletal:         General: Normal range of motion. Right lower leg: No edema. Left lower leg: No edema. Skin:     General: Skin is warm. Neurological:      General: No focal deficit present. Mental Status: She is alert. She is disoriented. Cranial Nerves: No cranial nerve deficit. Motor: No weakness. Comments: Alert oriented to person. Disoriented to place and situation. Psychiatric:         Mood and Affect: Mood normal.       Medications:   Medications:    apixaban  2.5 mg Oral BID    dilTIAZem  30 mg Oral 4 times per day    levothyroxine  75 mcg Oral Daily    pantoprazole  40 mg Oral QAM AC    sodium chloride flush  5-40 mL IntraVENous 2 times per day    metoprolol succinate  25 mg Oral Daily      Infusions:    sodium chloride       PRN Meds: melatonin, 3 mg, Nightly PRN  ipratropium-albuterol, 1 vial, Q4H PRN  sodium chloride flush, 5-40 mL, PRN  sodium chloride, , PRN  ondansetron, 4 mg, Q8H PRN   Or  ondansetron, 4 mg, Q6H PRN  polyethylene glycol, 17 g, Daily PRN  acetaminophen, 650 mg, Q6H PRN   Or  acetaminophen, 650 mg, Q6H PRN      Labs      Recent Results (from the past 24 hour(s))   TSH    Collection Time: 01/15/23  6:57 PM   Result Value Ref Range    TSH, High Sensitivity 3.330 0.270 - 4.20 uIu/ml   Basic Metabolic Panel    Collection Time: 01/16/23  6:34 AM   Result Value Ref Range    Sodium 145 135 - 145 MMOL/L    Potassium 4.1 3.5 - 5.1 MMOL/L    Chloride 112 (H) 99 - 110 mMol/L    CO2 23 21 - 32 MMOL/L    Anion Gap 10 4 - 16    BUN 20 6 - 23 MG/DL    Creatinine 1.3 (H) 0.6 - 1.1 MG/DL    Est, Glom Filt Rate 39 (L) >60 mL/min/1.73m2    Glucose 105 (H) 70 - 99 MG/DL    Calcium 8.2 (L) 8.3 - 10.6 MG/DL   Magnesium    Collection Time: 01/16/23  6:34 AM   Result Value Ref Range    Magnesium 2.0 1.8 - 2.4 mg/dl   Phosphorus    Collection Time: 01/16/23  6:34 AM   Result Value Ref Range    Phosphorus 3.5 2.5 - 4.9 MG/DL   CBC with Auto Differential    Collection Time: 01/16/23  6:34 AM   Result Value Ref Range    WBC 7.6 4.0 - 10.5 K/CU MM    RBC 4.09 (L) 4.2 - 5.4 M/CU MM    Hemoglobin 12.1 (L) 12.5 - 16.0 GM/DL    Hematocrit 37.6 37 - 47 %    MCV 91.9 78 - 100 FL    MCH 29.6 27 - 31 PG    MCHC 32.2 32.0 - 36.0 %    RDW 14.3 11.7 - 14.9 %    Platelets 669 752 - 857 K/CU MM    MPV 10.5 7.5 - 11.1 FL    Differential Type AUTOMATED DIFFERENTIAL     Segs Relative 50.3 36 - 66 %    Lymphocytes % 32.1 24 - 44 %    Monocytes % 13.2 (H) 0 - 4 %    Eosinophils % 3.4 (H) 0 - 3 %    Basophils % 0.7 0 - 1 %    Segs Absolute 3.8 K/CU MM    Lymphocytes Absolute 2.4 K/CU MM    Monocytes Absolute 1.0 K/CU MM    Eosinophils Absolute 0.3 K/CU MM    Basophils Absolute 0.1 K/CU MM    Nucleated RBC % 0.0 %    Total Nucleated RBC 0.0 K/CU MM    Total Immature Neutrophil 0.02 K/CU MM    Immature Neutrophil % 0.3 0 - 0.43 %        Imaging/Diagnostics Last 24 Hours   XR CHEST (2 VW)    Result Date: 1/14/2023  EXAMINATION: TWO XRAY VIEWS OF THE CHEST 1/14/2023 2:29 pm COMPARISON: November 16, 2022 HISTORY: ORDERING SYSTEM PROVIDED HISTORY: New onset Afib, Cough TECHNOLOGIST PROVIDED HISTORY: Reason for exam:->New onset Afib, Cough Reason for Exam: New onset Afib, Cough Additional signs and symptoms: New onset Afib, Cough, congestion FINDINGS: No evidence of active infiltrates to suggest pneumonia. Mild perihilar congestion but no evidence of active infiltrates. No evidence of pleural effusion or pneumothorax. Mild cardiomegaly. Mild cardiomegaly and perihilar congestion but no consolidative infiltrates or active pleural disease.        Electronically signed by Nilay Summers MD on 1/16/2023 at 11:13 AM

## 2023-01-16 NOTE — CARE COORDINATION
Niece notified CM that her first [place] [id number] choice is [facility] an #2 is [facility]. Faxed the referral to [phone].  TE

## 2023-01-16 NOTE — PLAN OF CARE
3523 called x [phone], spoke to Nurse, patient is refusing all exams, nurse will check again and will update us as to patients status.  CC

## 2023-01-16 NOTE — PROGRESS NOTES
4 Eyes Skin Assessment     NAME:  Jj Sesay  YOB: 1931  MEDICAL RECORD NUMBER:  [id number]    The patient is being assessed for  Transfer to New Unit    I agree that One RN have performed a thorough Head to Toe Skin Assessment on the patient. ALL assessment sites listed below have been assessed. Areas assessed by both nurses:    Head, Face, Ears, Shoulders, Back, Chest, Arms, Elbows, Hands, Sacrum. Buttock, Coccyx, Ischium, and Legs. Feet and Heels--Left shin scabbed, Right knee scabbed, coccyx and perineal area red, RUE red and swollen        Does the Patient have a Wound?  No noted wound(s)       Lamont Prevention initiated by RN: Yes   Wound Care Orders initiated by RN: NA    Pressure Injury (Stage 3,4, Unstageable, DTI, NWPT, and Complex wounds) if present place referral order by RN under : No    New and Established Ostomies, if present place, referral order under : No      Nurse 1 eSignature: Electronically signed by Wilian Brooks RN on 7/68/28 at 5:57 PM EST    **SHARE this note so that the co-signing nurse is able to place an eSignature**    Nurse 2 eSignature: Electronically signed by Lewis Quiros LPN on 6/11/36 at 5:88 PM EST

## 2023-01-16 NOTE — PROGRESS NOTES
I received a call from ultrasound department informing me patient refused for the procedure and request to reschedule it tomorrow. They agreed to book patient tomorrow.

## 2023-01-16 NOTE — PROGRESS NOTES
[address] [facility],Third Floor  Phone: [phone]    Fax [phone]                  Silvino Wheeler MD, Cris Benavidez MD, Kunal Samson MD, MD Sera Law MD Malone Rosenthal, MD Davied Conradi, MD Dr. Asher Cross MD Darnelle Quiet, APRN      Marquze Cardenas, APRN  Tete Mora, APRN    Ida Hawkins, APRN  Cris Patterson, PASuC    CARDIOLOGY  NOTE      Name:  Norm Acuna /Age/Sex: 3/26/1931  (80 y.o. female)   MRN & CSN:  [id number] & [id number] Admission Date/Time: 2023  7:53 PM   Location:  [address] [place] [address] PCP: Brenton Coates [address] [place] [address] Day: 3    - Cardiology consult is for:  New onset AF         Subjective:  Karen Reyes is a 80 y. o.year old     Patient is still complaining of some mild generalized weakness, tachycardia and coughing. Denying chest pain at this time. Objective: Temperature:  Current - Temp: 97.4 °F (36.3 °C); Max - Temp  Av.3 °F (36.3 °C)  Min: 96.7 °F (35.9 °C)  Max: 98.7 °F (37.1 °C)    Respiratory Rate : Resp  Av.6  Min: 17  Max: 28    Pulse Range: Pulse  Av.2  Min: 77  Max: 112    Blood Presuure Range:  Systolic (62CKU), RTN:558 , Min:99 , AHI:803   ; Diastolic (26JCF), NRH:33, Min:59, Max:99      Pulse ox Range: SpO2  Av %  Min: 94 %  Max: 98 %    24hr I & O:    Intake/Output Summary (Last 24 hours) at 2023 1422  Last data filed at 2023 0500  Gross per 24 hour   Intake 60 ml   Output 430 ml   Net -370 ml         /73   Pulse 96   Temp 97.4 °F (36.3 °C) (Oral)   Resp 17   Ht 4' 11\" (1.499 m)   Wt 119 lb 14.9 oz (54.4 kg)   SpO2 98%   BMI 24.22 kg/m²         TELEMETRY: Atrial fibrillation with HR 90-110s      has a past medical history of Arthritis and Thyroid disease. has a past surgical history that includes Tonsillectomy; Appendectomy; Cholecystectomy;  Hysterectomy; eye surgery; and fracture surgery. Physical Exam  Constitutional:       General: She is not in acute distress. Appearance: She is not diaphoretic. HENT:      Head: Normocephalic and atraumatic. Right Ear: External ear normal.      Left Ear: External ear normal.      Nose: Nose normal.      Mouth/Throat:      Mouth: Mucous membranes are moist.   Eyes:      Extraocular Movements: Extraocular movements intact. Comments: Pupils equal and round   Neck:      Vascular: No carotid bruit. Cardiovascular:      Rate and Rhythm: Tachycardia present. Rhythm irregularly irregular. Pulses: Normal pulses. Heart sounds: S1 normal and S2 normal. No murmur heard. No friction rub. No gallop. Pulmonary:      Effort: Pulmonary effort is normal. No respiratory distress. Breath sounds: Normal breath sounds. No rales. Chest:      Chest wall: No tenderness. Abdominal:      Palpations: Abdomen is soft. Tenderness: There is no abdominal tenderness. Musculoskeletal:      Right lower leg: No edema. Left lower leg: No edema. Skin:     General: Skin is warm and dry. Capillary Refill: Capillary refill takes less than 2 seconds. Findings: No rash. Comments: Skin turgor brisk   Neurological:      Mental Status: She is alert and oriented to person, place, and time. Mental status is at baseline. Psychiatric:         Behavior: Behavior is cooperative. Thought Content:  Thought content normal.         Judgment: Judgment normal.        Medications:    apixaban  2.5 mg Oral BID    dilTIAZem  30 mg Oral 4 times per day    levothyroxine  75 mcg Oral Daily    pantoprazole  40 mg Oral QAM AC    sodium chloride flush  5-40 mL IntraVENous 2 times per day    metoprolol succinate  25 mg Oral Daily      sodium chloride       melatonin, ipratropium-albuterol, sodium chloride flush, sodium chloride, ondansetron **OR** ondansetron, polyethylene glycol, acetaminophen **OR** acetaminophen    Lab Data:  CBC:   Recent Labs     01/14/23  1315 01/15/23  0832 01/16/23  0634   WBC 10.0 6.3 7.6   HGB 14.0 11.9* 12.1*   HCT 43.3 37.1 37.6   MCV 92.3 92.8 91.9    143 156     BMP:   Recent Labs     01/14/23  1315 01/15/23  0832 01/16/23  0634    141 145   K 4.4 3.9 4.1    107 112*   CO2 21 23 23   PHOS  --  3.4 3.5   BUN 18 17 20   CREATININE 1.3* 1.3* 1.3*     LIVER PROFILE:   Recent Labs     01/14/23  1315   AST 23   ALT 12   BILITOT 0.9   ALKPHOS 93     PT/INR:   Recent Labs     01/14/23  1315   PROTIME 14.9*   INR 1.23     APTT: No results for input(s): APTT in the last 72 hours. BNP:  No results for input(s): BNP in the last 72 hours. TROPONIN:   Recent Labs     01/14/23  1315 01/14/23  2120   TROPONINT 0.015* 0.018*     Labs, consult, tests reviewed          Kendall Urbina PA-C, 1/16/2023 2:22 PM           CARDIOLOGY ATTENDING ADDENDUM    I have seen, spoken to and examined this patient personally, independent of the NP/PAC. I have reviewed the hospital care given to date and reviewed all pertinent labs and imaging. I have spoken with patient, nursing staff and provided written and verbal instructions . The above note has been reviewed. I have spent substantive amount of time in formulating patient care. Physical Exam:    General:   Awake, alert  Head:normal  Eye:normal  Chest:   Clear to auscultation  0 Basilar crackles   Cardiovascular:  S1S2 IRIR         MEDICAL DECISION MAKING :     New onset atrial fibrillation  Elevated troponin, non-ACS secondary to A. 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 S/p Lasix. Start low-dose Lasix 20 mg oral every other day.     Patient responded well to Lopressor and short acting Cardizem  Switch over to long-acting Cardizem  Discussed with the family again no plans of cardioversion at the time  Continue with oral anticoagulation Eliquis 2.5 mg twice daily      ECHO      Left ventricular systolic function is low normal.   Ejection fraction is visually estimated at 45-50%. Grade II diastolic dysfunction. Global hypokinesis noted. Moderate aortic regurgitation; PHT: 283 msec. Mild to moderate mitral regurgitation. No evidence of any pericardial effusion.         Dr. Elsie Esquivel MD

## 2023-01-17 VITALS
DIASTOLIC BLOOD PRESSURE: 84 MMHG | RESPIRATION RATE: 16 BRPM | BODY MASS INDEX: 24.18 KG/M2 | HEIGHT: 59 IN | OXYGEN SATURATION: 95 % | HEART RATE: 65 BPM | TEMPERATURE: 97.5 F | WEIGHT: 119.93 LBS | SYSTOLIC BLOOD PRESSURE: 126 MMHG

## 2023-01-17 LAB
ANION GAP SERPL CALCULATED.3IONS-SCNC: 9 MMOL/L (ref 4–16)
ANTITHYROGLOBULIN AB: 20.6 IU/ML (ref 0–4)
ANTITHYROID MICORSOMAL: 4.2 IU/ML (ref 0–9)
BASOPHILS ABSOLUTE: 0 K/CU MM
BASOPHILS RELATIVE PERCENT: 0.5 % (ref 0–1)
BUN BLDV-MCNC: 20 MG/DL (ref 6–23)
CALCIUM SERPL-MCNC: 8.3 MG/DL (ref 8.3–10.6)
CHLORIDE BLD-SCNC: 107 MMOL/L (ref 99–110)
CO2: 24 MMOL/L (ref 21–32)
CREAT SERPL-MCNC: 1.5 MG/DL (ref 0.6–1.1)
DIFFERENTIAL TYPE: ABNORMAL
EOSINOPHILS ABSOLUTE: 0.3 K/CU MM
EOSINOPHILS RELATIVE PERCENT: 4.1 % (ref 0–3)
GFR SERPL CREATININE-BSD FRML MDRD: 33 ML/MIN/1.73M2
GLUCOSE BLD-MCNC: 114 MG/DL (ref 70–99)
HCT VFR BLD CALC: 39.6 % (ref 37–47)
HEMOGLOBIN: 12.5 GM/DL (ref 12.5–16)
IMMATURE NEUTROPHIL %: 0.3 % (ref 0–0.43)
LYMPHOCYTES ABSOLUTE: 2.4 K/CU MM
LYMPHOCYTES RELATIVE PERCENT: 32.1 % (ref 24–44)
MAGNESIUM: 1.9 MG/DL (ref 1.8–2.4)
MCH RBC QN AUTO: 29.3 PG (ref 27–31)
MCHC RBC AUTO-ENTMCNC: 31.6 % (ref 32–36)
MCV RBC AUTO: 92.7 FL (ref 78–100)
MONOCYTES ABSOLUTE: 0.9 K/CU MM
MONOCYTES RELATIVE PERCENT: 11.7 % (ref 0–4)
NUCLEATED RBC %: 0 %
PDW BLD-RTO: 14.5 % (ref 11.7–14.9)
PHOSPHORUS: 3.7 MG/DL (ref 2.5–4.9)
PLATELET # BLD: 179 K/CU MM (ref 140–440)
PMV BLD AUTO: 10.3 FL (ref 7.5–11.1)
POTASSIUM SERPL-SCNC: 4 MMOL/L (ref 3.5–5.1)
RBC # BLD: 4.27 M/CU MM (ref 4.2–5.4)
SEGMENTED NEUTROPHILS ABSOLUTE COUNT: 3.9 K/CU MM
SEGMENTED NEUTROPHILS RELATIVE PERCENT: 51.3 % (ref 36–66)
SODIUM BLD-SCNC: 140 MMOL/L (ref 135–145)
TOTAL IMMATURE NEUTOROPHIL: 0.02 K/CU MM
TOTAL NUCLEATED RBC: 0 K/CU MM
WBC # BLD: 7.6 K/CU MM (ref 4–10.5)

## 2023-01-17 PROCEDURE — 36415 COLL VENOUS BLD VENIPUNCTURE: CPT

## 2023-01-17 PROCEDURE — 6370000000 HC RX 637 (ALT 250 FOR IP)

## 2023-01-17 PROCEDURE — 2580000003 HC RX 258: Performed by: INTERNAL MEDICINE

## 2023-01-17 PROCEDURE — 6370000000 HC RX 637 (ALT 250 FOR IP): Performed by: INTERNAL MEDICINE

## 2023-01-17 PROCEDURE — 99233 SBSQ HOSP IP/OBS HIGH 50: CPT | Performed by: INTERNAL MEDICINE

## 2023-01-17 PROCEDURE — 84100 ASSAY OF PHOSPHORUS: CPT

## 2023-01-17 PROCEDURE — 85025 COMPLETE CBC W/AUTO DIFF WBC: CPT

## 2023-01-17 PROCEDURE — 83735 ASSAY OF MAGNESIUM: CPT

## 2023-01-17 PROCEDURE — 80048 BASIC METABOLIC PNL TOTAL CA: CPT

## 2023-01-17 PROCEDURE — APPSS30 APP SPLIT SHARED TIME 16-30 MINUTES

## 2023-01-17 RX ORDER — DILTIAZEM HYDROCHLORIDE 180 MG/1
180 CAPSULE, COATED, EXTENDED RELEASE ORAL DAILY
Qty: 30 CAPSULE | Refills: 3 | Status: SHIPPED | OUTPATIENT
Start: 2023-01-18 | End: 2023-01-26 | Stop reason: SDUPTHER

## 2023-01-17 RX ORDER — FUROSEMIDE 20 MG/1
20 TABLET ORAL EVERY OTHER DAY
Qty: 60 TABLET | Refills: 3 | Status: SHIPPED | OUTPATIENT
Start: 2023-01-18 | End: 2023-01-26 | Stop reason: SDUPTHER

## 2023-01-17 RX ADMIN — APIXABAN 2.5 MG: 2.5 TABLET, FILM COATED ORAL at 08:57

## 2023-01-17 RX ADMIN — DILTIAZEM HYDROCHLORIDE 180 MG: 180 CAPSULE, COATED, EXTENDED RELEASE ORAL at 08:57

## 2023-01-17 RX ADMIN — LEVOTHYROXINE SODIUM 75 MCG: 0.07 TABLET ORAL at 06:27

## 2023-01-17 RX ADMIN — Medication 5 ML: at 08:57

## 2023-01-17 RX ADMIN — PANTOPRAZOLE SODIUM 40 MG: 40 TABLET, DELAYED RELEASE ORAL at 06:27

## 2023-01-17 RX ADMIN — METOPROLOL TARTRATE 12.5 MG: 25 TABLET, FILM COATED ORAL at 08:57

## 2023-01-17 NOTE — PROGRESS NOTES
[facility] [address]  Phone: [phone]    Fax [phone]                  Yana Blackmon MD, Edgar Burris MD, Mraley Ferrari MD, Eulas Hodgkin, MD Mabel Runner, MD Jacquelyn Armor, MD Daphne Hatchet, MD Dr. Cleven Deforest MD Almond Pin, ALEJANDRO Morrell, ALEJANDRO Collins, ALEJANDRO Whitehead, ALEJANDRO Giang PA-C    CARDIOLOGY  NOTE      Name:  Desiree Le /Age/Sex: 3/26/1931  (80 y.o. female)   MRN & CSN:  [id number] & [id number] Admission Date/Time: 2023  7:53 PM   Location:  8627[address]5046[address]E PCP: Lluvia Salcedo, [address] [place] [address] Day: 4    - Cardiology consult is for:  new onset AF    Subjective:  Sarah Echols is a 80 y. o.year old     No complaints of SOB or chest pain today. Patient is safe for discharge from cardiology perspective    Objective: Temperature:  Current - Temp: 97.5 °F (36.4 °C); Max - Temp  Av.4 °F (36.3 °C)  Min: 97.1 °F (36.2 °C)  Max: 97.9 °F (36.6 °C)    Respiratory Rate : Resp  Av.4  Min: 16  Max: 28    Pulse Range: Pulse  Av.9  Min: 56  Max: 96    Blood Presuure Range:  Systolic (40BAO), FUT:492 , Min:108 , UZN:679   ; Diastolic (31SXF), KTP:96, Min:59, Max:84      Pulse ox Range: SpO2  Av.8 %  Min: 95 %  Max: 99 %    24hr I & O:    Intake/Output Summary (Last 24 hours) at 2023 1025  Last data filed at 2023 1700  Gross per 24 hour   Intake 480 ml   Output --   Net 480 ml         /84   Pulse 65   Temp 97.5 °F (36.4 °C) (Oral)   Resp 16   Ht 4' 11\" (1.499 m)   Wt 119 lb 14.9 oz (54.4 kg)   SpO2 95%   BMI 24.22 kg/m²         TELEMETRY: Atrial fibrillation rate controlled     has a past medical history of Arthritis and Thyroid disease. has a past surgical history that includes Tonsillectomy; Appendectomy; Cholecystectomy; Hysterectomy; eye surgery; and fracture surgery.     Physical Exam  Constitutional:       General: She is not in acute distress. Appearance: She is not diaphoretic. HENT:      Head: Normocephalic and atraumatic. Right Ear: External ear normal.      Left Ear: External ear normal.      Nose: Nose normal.      Mouth/Throat:      Mouth: Mucous membranes are moist.   Eyes:      Extraocular Movements: Extraocular movements intact. Comments: Pupils equal and round   Neck:      Vascular: No carotid bruit. Cardiovascular:      Rate and Rhythm: Normal rate. Rhythm irregularly irregular. Pulses: Normal pulses. Heart sounds: S1 normal and S2 normal. No murmur heard. No friction rub. No gallop. Pulmonary:      Effort: Pulmonary effort is normal. No respiratory distress. Breath sounds: Normal breath sounds. No rales. Chest:      Chest wall: No tenderness. Abdominal:      Palpations: Abdomen is soft. Tenderness: There is no abdominal tenderness. Musculoskeletal:      Right lower leg: No edema. Left lower leg: No edema. Skin:     General: Skin is warm and dry. Capillary Refill: Capillary refill takes less than 2 seconds. Findings: No rash. Comments: Skin turgor brisk   Neurological:      Mental Status: She is alert and oriented to person, place, and time. Mental status is at baseline. Psychiatric:         Behavior: Behavior is cooperative. Thought Content:  Thought content normal.         Judgment: Judgment normal.        Medications:    dilTIAZem  180 mg Oral Daily    metoprolol tartrate  12.5 mg Oral BID    furosemide  20 mg Oral Every Other Day    apixaban  2.5 mg Oral BID    levothyroxine  75 mcg Oral Daily    pantoprazole  40 mg Oral QAM AC    sodium chloride flush  5-40 mL IntraVENous 2 times per day      sodium chloride       melatonin, ipratropium-albuterol, sodium chloride flush, sodium chloride, ondansetron **OR** ondansetron, polyethylene glycol, acetaminophen **OR** acetaminophen    Lab Data:  CBC: Recent Labs     01/15/23  0832 01/16/23  0634 01/17/23  0500   WBC 6.3 7.6 7.6   HGB 11.9* 12.1* 12.5   HCT 37.1 37.6 39.6   MCV 92.8 91.9 92.7    156 179     BMP:   Recent Labs     01/15/23  0832 01/16/23  0634 01/17/23  0500    145 140   K 3.9 4.1 4.0    112* 107   CO2 23 23 24   PHOS 3.4 3.5 3.7   BUN 17 20 20   CREATININE 1.3* 1.3* 1.5*     LIVER PROFILE:   Recent Labs     01/14/23  1315   AST 23   ALT 12   BILITOT 0.9   ALKPHOS 93     PT/INR:   Recent Labs     01/14/23  1315   PROTIME 14.9*   INR 1.23     APTT: No results for input(s): APTT in the last 72 hours. BNP:  No results for input(s): BNP in the last 72 hours. TROPONIN:   Recent Labs     01/14/23  1315 01/14/23  2120   TROPONINT 0.015* 0.018*     Labs, consult, tests reviewed          Sammi Buenrostro PA-C, 1/17/2023 10:25 AM           CARDIOLOGY ATTENDING ADDENDUM    I have seen, spoken to and examined this patient personally, independent of the NP/PAC. I have reviewed the hospital care given to date and reviewed all pertinent labs and imaging. I have spoken with patient, nursing staff and provided written and verbal instructions . The above note has been reviewed. I have spent substantive amount of time in formulating patient care. Physical Exam:    General:    Awake, alert  Head:normal  Eye:normal  Chest:   Clear to auscultation  0 Basilar crackles   Cardiovascular:  S1S2 IRIR  Abdomen: soft   Extremities:   0 edema  Pulses; palpable      MEDICAL DECISION MAKING :        New onset atrial fibrillation  Elevated troponin, non-ACS secondary to A. 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 Patient responded well to Lopressor and short acting Cardizem  Continue Cardizem 180 mg daily.   Overnight heart rate has been fairly well controlled. Discussed with the family again no plans of cardioversion at the time  [id number]: 3  Continue with oral anticoagulation Eliquis 2.5 mg twice daily    Patient is DNR CCA      Cardiology will sign off.  Patient will follow up with Dr. Ji Lamas as outpatient         Dr. Shane Sainz MD

## 2023-01-17 NOTE — PROGRESS NOTES
Progress Note( Dr. Aravind Serrano)  1/16/2023  Subjective:   Admit Date: 1/14/2023  PCP: Harshal De La Cruz, DO    Admitted For :   Atrial fibrillation    Consulted For:  Review thyroid function test??  Appropriate dose    Interval History: Patient feels lot better still has atrial fibrillation but rate is slowed     Denies any chest pains,   Denies SOB . Denies nausea or vomiting. No new bowel or bladder symptoms. Intake/Output Summary (Last 24 hours) at 1/16/2023 1934  Last data filed at 1/16/2023 1700  Gross per 24 hour   Intake 480 ml   Output 430 ml   Net 50 ml       DATA    CBC:   Recent Labs     01/14/23  1315 01/15/23  0832 01/16/23  0634   WBC 10.0 6.3 7.6   HGB 14.0 11.9* 12.1*    143 156    CMP:  Recent Labs     01/14/23  1315 01/15/23  0832 01/16/23  0634    141 145   K 4.4 3.9 4.1    107 112*   CO2 21 23 23   BUN 18 17 20   CREATININE 1.3* 1.3* 1.3*   CALCIUM 8.9 8.0* 8.2*   PROT 7.0  --   --    LABALBU 3.9  --   --    BILITOT 0.9  --   --    ALKPHOS 93  --   --    AST 23  --   --    ALT 12  --   --      Lipids: No results found for: CHOL, HDL, TRIG  Glucose:No results for input(s): POCGLU in the last 72 hours. [id number]:No results found for: LABA1C  High Sensitivity TSH:   Lab Results   Component Value Date/Time    TSHHS 3.330 01/15/2023 06:57 PM     Free T3: No results found for: FT3  Free T4:  Lab Results   Component Value Date/Time    T4FREE 1.52 01/14/2023 01:15 PM       US HEAD NECK SOFT TISSUE THYROID   Final Result   1. Atrophic, heterogeneous thyroid parenchyma potentially due to chronic   changes of thyroiditis or prior radioiodine ablation. 2. Incidental 0.3 cm TI-RADS TR4 coarsely calcified nodule in the lower left   thyroid lobe. No dedicated follow-up is recommended as below.       RECOMMENDATIONS:   ACR TI-RADS recommendations      TR4 (4-6 points):  FNA if >= 1.5 cm; follow-up if 1.0-1.4 cm in 1, 2, 3, and   5 years              Scheduled Medicines Medications:    dilTIAZem  180 mg Oral Daily    metoprolol tartrate  12.5 mg Oral BID    furosemide  20 mg Oral Every Other Day    apixaban  2.5 mg Oral BID    levothyroxine  75 mcg Oral Daily    pantoprazole  40 mg Oral QAM AC    sodium chloride flush  5-40 mL IntraVENous 2 times per day      Infusions:    sodium chloride           Objective:   Vitals: BP (!) 108/59   Pulse 56   Temp 97.1 °F (36.2 °C) (Oral)   Resp 28   Ht 4' 11\" (1.499 m)   Wt 119 lb 14.9 oz (54.4 kg)   SpO2 99%   BMI 24.22 kg/m²   General appearance: alert and cooperative with exam  Neck: no JVD or bruit  Thyroid : Atrophied thyroid gland BP is one  small sub centimeter nodule on the left lobe  Lungs: Has Vesicular Breath sounds   Heart: Atrial fibrillation  Abdomen: soft, non-tender; bowel sounds normal; no masses,  no organomegaly  Musculoskeletal: Normal  Extremities: extremities normal, , no edema  Neurologic:  Awake, alert, oriented to name, place and time. Cranial nerves II-XII are grossly intact. Motor is  intact. Sensory is intact. ,  and gait is abnormal unstable  Due to use walker at home. Assessment:     Patient Active Problem List:     AMS (altered mental status)     Physical debility     Atrial fibrillation ([address]      Hypothyroidism      Plan:     Reviewed POC blood glucose . Labs and X ray results   Reviewed Current Medicines   Repeat TSH normal.,  Her serum free T4 is 1.52 that is appropriate  Lungs show an atrophic gland with 1 small subcentimeter left-sided thyroid nodule that does not require any biopsy  Other thyroid function test are pending  Will keep on current dose of Synthroid  Will follow     .      Zay Miller MD, MD

## 2023-01-17 NOTE — PROGRESS NOTES
Outpatient Pharmacy Progress Note for Meds-to-Beds    Total number of Prescriptions Filled: 4  The following medications were dispensed to the patient during the discharge process:  Eliquis  Metoprolol  Furosemide  Diltiazem     Additional Documentation:  Patient picked-up the medication(s) in the OP Pharmacy      Thank you for letting us serve your patients.   [facility]    [address], [address] [facility]    Phone: [phone]    Fax: [phone]

## 2023-01-17 NOTE — CARE COORDINATION
[id number] [facility] Liaison spoke with pts katherine Lincoln and she is agreeable to [facility]hc at discharge.  Please place inpatient consult to home health needs order in [facility].

## 2023-01-17 NOTE — DISCHARGE SUMMARY
V2.0  Discharge Summary    Name:  Carrillo Tony /Age/Sex: 3/26/1931 (80 y.o. female)   Admit Date: 2023  Discharge Date: 23    MRN & CSN:  [id number] & [id number] Encounter Date and Time 23 3:40 PM EST    Attending:  No att. providers found Discharging Provider: Navjot oCx MD       Discharge Diagnosess:     A. fib with RVR  Shortness of breath  NSTEMI type II  Hypothyroidism  CKD stage III  Mild cognitive impairment  Right ovarian mass      Admission HPI, hospital course, and consultants:     Admission HPI:  Allyssa Ramirez is a 80 y.o. female with pmh of hypothyroidism, right ovarian cystic mass, mild cognitive impairment, who presents with atrial fibrillation in RVR. Patient presented to urgent care initially with complaints of cough and shortness of breath, and also weakness over the past 1 to 2 days. She was found to be tachycardic and was sent to the emergency where she was found to be in A. fib RVR. She does not have any prior history of arrhythmia. She was given diltiazem 10 Mg a total of 3 doses at 1:43 PM, 3:17 PM, and 6:47 PM.  She she appears to be rate controlled at the time of transfer from the emergency but she arrived in RVR again to the floor with a heart rate around 1 30-1 50. She does not report any symptoms of palpitation, chest pain, or shortness of breath to me. She does not appear to have any crackles on auscultation but her proBNP is elevated to 9000, no peripheral edema. She reports she did not take her levothyroxine this morning, but she is not overmedicated with TSH at 5.39. No complaints of fever, chills, rigors. No complaints of abdominal pain, burning micturition, headache, focal neurological weaknesses. \"    Hospital course:  A. fib with RVR  -New onset. Did not respond very well to IV boluses of AV blocking medications. Started on diltiazem drip then transition to p.o. Heart rate better controlled now. Home metoprolol restarted.   -CHADS2 Vascc score of 3 started on Lovenox 1 alina per KG twice daily, now transition to oral anticoagulation with Eliquis. .  Echocardiogram ordered. Cardiology consulted. Shortness of breath  -Concern for decompensated heart failure due to A. fib. Echocardiogram is pending. Did receive 20 mg of IV Lasix yesterday and had decent urine output. Somewhat improved. Awaiting echocardiogram.     NSTEMI  -Likely in the setting of demand due to tachycardia  -Trend troponin. Patient does not have chest pain     Hypothyroidism  -Continue home Synthroid 75 mcg. TSH is 5.39.  -Endocrinology was consulted by cardiology given abnormal TFTs. Recommended continuing Synthroid at current dose. Ultrasound of the echo was ordered which is pending. CKD stage III  -Creatinine at baseline. Continue to monitor. Mild cognitive impairment  -Delirium precautions     Right ovarian mass  -Follows with [facility]. As per patient and family no plans to pursue treatment given age and comorbidities. The patient expressed appropriate understanding of, and agreement with the discharge recommendations, medications, and plan. Consults this admission:  IP CONSULT TO CARDIOLOGY  IP CONSULT TO ENDOCRINOLOGY  IP CONSULT TO HOME CARE NEEDS    Discharge Instructions:    Follow up appointments: cardiology, endocrinology  Primary care physician: Сергей De La Cruz DO within 2 weeks  Diet: diabetic diet   Activity: activity as tolerated  Disposition: Discharged to:   [x]Home, []HHC, []SNF, []Acute Rehab, []Hospice   Condition on discharge: Stable  Labs and Tests to be Followed up as an outpatient by PCP or Specialist: none    Discharge Medications:        Medication List        START taking these medications      apixaban 2.5 MG Tabs tablet  Commonly known as: ELIQUIS  Take 1 tablet by mouth 2 times daily     dilTIAZem 180 MG extended release capsule  Commonly known as: CARDIZEM CD  Take 1 capsule by mouth daily  Start taking on: January 18, 2023 furosemide 20 MG tablet  Commonly known as: LASIX  Take 1 tablet by mouth every other day  Start taking on: January 18, 2023     metoprolol tartrate 25 MG tablet  Commonly known as: LOPRESSOR  Take 0.5 tablets by mouth 2 times daily            CONTINUE taking these medications      ipratropium-albuterol 0.5-2.5 (3) MG/3ML Soln nebulizer solution  Commonly known as: DUONEB  Inhale 3 mLs into the lungs every 4 hours as needed for Shortness of Breath (While awake (4 doses max per day))     levothyroxine 75 MCG tablet  Commonly known as: SYNTHROID  Take 1 tablet by mouth Daily     omeprazole 20 MG delayed release capsule  Commonly known as: PRILOSEC            STOP taking these medications      metoprolol succinate 25 MG extended release tablet  Commonly known as: TOPROL XL               Where to Get Your Medications        These medications were sent to [address] [place] [address], [address] [facility]      Phone: [phone]   apixaban 2.5 MG Tabs tablet  dilTIAZem 180 MG extended release capsule  furosemide 20 MG tablet  metoprolol tartrate 25 MG tablet        Objective Findings at Discharge:   /84   Pulse 65   Temp 97.5 °F (36.4 °C) (Oral)   Resp 16   Ht 4' 11\" (1.499 m)   Wt 119 lb 14.9 oz (54.4 kg)   SpO2 95%   BMI 24.22 kg/m²       Physical Exam:   General: NAD  Eyes: EOMI  ENT: neck supple  Cardiovascular: Regular rate. Respiratory: Clear to auscultation  Gastrointestinal: Soft, non tender  Genitourinary: no suprapubic tenderness  Musculoskeletal: No edema  Skin: warm, dry  Neuro: Alert. Psych: Mood appropriate.          Labs and Imaging   XR CHEST (2 VW)    Result Date: 1/14/2023  EXAMINATION: TWO XRAY VIEWS OF THE CHEST 1/14/2023 2:29 pm COMPARISON: November 16, 2022 HISTORY: ORDERING SYSTEM PROVIDED HISTORY: New onset Afib, Cough TECHNOLOGIST PROVIDED HISTORY: Reason for exam:->New onset Afib, Cough Reason for Exam: New onset Afib, Cough Additional signs and symptoms: New onset Afib, Cough, congestion FINDINGS: No evidence of active infiltrates to suggest pneumonia. Mild perihilar congestion but no evidence of active infiltrates. No evidence of pleural effusion or pneumothorax. Mild cardiomegaly. Mild cardiomegaly and perihilar congestion but no consolidative infiltrates or active pleural disease. US HEAD NECK SOFT TISSUE THYROID    Result Date: 1/16/2023  EXAMINATION: THYROID ULTRASOUND 1/15/2023 8:23 pm COMPARISON: None HISTORY: ORDERING SYSTEM PROVIDED HISTORY: hypothyroid TECHNOLOGIST PROVIDED HISTORY: Reason for exam:->hypothyroid FINDINGS: Right thyroid lobe:  1.7 cm x 0.8 cm x 0.7 cm Left thyroid lobe:  2.2 cm x 0.9 cm x 1.0 cm Isthmus:  0.4 cm THYROID GLAND:  Atrophic. Mildly heterogeneous echogenicity and echotexture. Normal vascularity. NODULES:  As below. - Nodule 1:  0.3 cm x 0.2 cm x 0.2 cm, lower left lobe, solid (2), echogenecity cannot be determined (2), wider-than-tall (0), smooth margins (0), macrocalcifications (1), TR4, no known prior biopsy CERVICAL LYMPHADENOPATHY:  None visualized. 1. Atrophic, heterogeneous thyroid parenchyma potentially due to chronic changes of thyroiditis or prior radioiodine ablation. 2. Incidental 0.3 cm TI-RADS TR4 coarsely calcified nodule in the lower left thyroid lobe. No dedicated follow-up is recommended as below.  RECOMMENDATIONS: ACR TI-RADS recommendations TR4 (4-6 points):  FNA if >= 1.5 cm; follow-up if 1.0-1.4 cm in 1, 2, 3, and 5 years       CBC:   Recent Labs     01/15/23  0832 01/16/23  0634 01/17/23  0500   WBC 6.3 7.6 7.6   HGB 11.9* 12.1* 12.5    156 179     BMP:    Recent Labs     01/15/23  0832 01/16/23  0634 01/17/23  0500    145 140   K 3.9 4.1 4.0    112* 107   CO2 23 23 24   BUN 17 20 20   CREATININE 1.3* 1.3* 1.5*   GLUCOSE 87 105* 114*     Hepatic: No results for input(s): AST, ALT, ALB, BILITOT, ALKPHOS in the last 72 hours. Lipids: No results found for: CHOL, HDL, TRIG  Hemoglobin A1C: No results found for: LABA1C  TSH: No results found for: TSH  Troponin:   Lab Results   Component Value Date/Time    TROPONINT 0.018 01/14/2023 09:20 PM    TROPONINT 0.015 01/14/2023 01:15 PM    TROPONINT <0.010 09/01/2022 11:05 PM     Lactic Acid: No results for input(s): LACTA in the last 72 hours. BNP: No results for input(s): PROBNP in the last 72 hours.   UA:  Lab Results   Component Value Date/Time    NITRU NEGATIVE 11/16/2022 01:15 PM    COLORU YELLOW 11/16/2022 01:15 PM    WBCUA 15 09/01/2022 11:50 PM    RBCUA 2 09/01/2022 11:50 PM    MUCUS 2+ 01/12/2022 06:15 PM    BACTERIA FEW 09/01/2022 11:50 PM    CLARITYU CLEAR 11/16/2022 01:15 PM    SPECGRAV 1.025 11/16/2022 01:15 PM    LEUKOCYTESUR NEGATIVE 11/16/2022 01:15 PM    UROBILINOGEN 0.2 11/16/2022 01:15 PM    BILIRUBINUR NEGATIVE 11/16/2022 01:15 PM    BLOODU NEGATIVE 11/16/2022 01:15 PM    KETUA NEGATIVE 11/16/2022 01:15 PM     Urine Cultures: No results found for: LABURIN  Blood Cultures: No results found for: BC  No results found for: BLOODCULT2  Organism: No results found for: ORG    Time Spent Discharging patient 45 minutes    Electronically signed by Christina Mcdonald MD on 1/17/2023 at 3:40 PM

## 2023-01-17 NOTE — PROGRESS NOTES
Progress Note( Dr. Cristina Milligan)  1/17/2023  Subjective:   Admit Date: 1/14/2023  PCP: Nick De La Cruz, DO    Admitted For :   Atrial fibrillation    Consulted For:  Review thyroid function test??  Appropriate dose    Interval History: Patient feels lot better still has atrial fibrillation but rate is slowed     Denies any chest pains,   Denies SOB . Denies nausea or vomiting. No new bowel or bladder symptoms. Intake/Output Summary (Last 24 hours) at 1/17/2023 0859  Last data filed at 1/16/2023 1700  Gross per 24 hour   Intake 480 ml   Output --   Net 480 ml         DATA    CBC:   Recent Labs     01/15/23  0832 01/16/23  0634 01/17/23  0500   WBC 6.3 7.6 7.6   HGB 11.9* 12.1* 12.5    156 179      CMP:  Recent Labs     01/14/23  1315 01/15/23  0832 01/16/23  0634 01/17/23  0500    141 145 140   K 4.4 3.9 4.1 4.0    107 112* 107   CO2 21 23 23 24   BUN 18 17 20 20   CREATININE 1.3* 1.3* 1.3* 1.5*   CALCIUM 8.9 8.0* 8.2* 8.3   PROT 7.0  --   --   --    LABALBU 3.9  --   --   --    BILITOT 0.9  --   --   --    ALKPHOS 93  --   --   --    AST 23  --   --   --    ALT 12  --   --   --        Lipids: No results found for: CHOL, HDL, TRIG  Glucose:No results for input(s): POCGLU in the last 72 hours. [id number]:No results found for: LABA1C  High Sensitivity TSH:   Lab Results   Component Value Date/Time    TSHHS 3.330 01/15/2023 06:57 PM     Free T3: No results found for: FT3  Free T4:  Lab Results   Component Value Date/Time    T4FREE 1.52 01/14/2023 01:15 PM       US HEAD NECK SOFT TISSUE THYROID   Final Result   1. Atrophic, heterogeneous thyroid parenchyma potentially due to chronic   changes of thyroiditis or prior radioiodine ablation. 2. Incidental 0.3 cm TI-RADS TR4 coarsely calcified nodule in the lower left   thyroid lobe. No dedicated follow-up is recommended as below.       RECOMMENDATIONS:   ACR TI-RADS recommendations      TR4 (4-6 points):  FNA if >= 1.5 cm; follow-up if 1.0-1.4 cm in 1, 2, 3, and   5 years              Scheduled Medicines   Medications:    dilTIAZem  180 mg Oral Daily    metoprolol tartrate  12.5 mg Oral BID    furosemide  20 mg Oral Every Other Day    apixaban  2.5 mg Oral BID    levothyroxine  75 mcg Oral Daily    pantoprazole  40 mg Oral QAM AC    sodium chloride flush  5-40 mL IntraVENous 2 times per day      Infusions:    sodium chloride           Objective:   Vitals: /84   Pulse 65   Temp 97.5 °F (36.4 °C) (Oral)   Resp 16   Ht 4' 11\" (1.499 m)   Wt 119 lb 14.9 oz (54.4 kg)   SpO2 95%   BMI 24.22 kg/m²   General appearance: alert and cooperative with exam  Neck: no JVD or bruit  Thyroid : Atrophied thyroid gland BP is one  small sub centimeter nodule on the left lobe  Lungs: Has Vesicular Breath sounds   Heart: Atrial fibrillation  Abdomen: soft, non-tender; bowel sounds normal; no masses,  no organomegaly  Musculoskeletal: Normal  Extremities: extremities normal, , no edema  Neurologic:  Awake, alert, oriented to name, place and time. Cranial nerves II-XII are grossly intact. Motor is  intact. Sensory is intact. ,  and gait is abnormal unstable  Due to use walker at home. Assessment:     Patient Active Problem List:     AMS (altered mental status)     Physical debility     Atrial fibrillation ([address]      Hypothyroidism      Plan:     Reviewed POC blood glucose . Labs and X ray results   Reviewed Current Medicines   Repeat TSH normal.,  Her serum free T4 is 1.52 that is appropriate  Lungs show an atrophic gland with 1 small subcentimeter left-sided thyroid nodule that does not require any biopsy  Other thyroid function test are pending  Will keep on current dose of Synthroid  Will follow     .      Wayne Lemons MD, MD

## 2023-01-18 NOTE — PROGRESS NOTES
Physician Progress Note      PATIENT:               Blanca Agee  CSN #:                  [id number]  :                       3/26/1931  ADMIT DATE:       2023 7:53 PM  [address] DATE:        2023 1:00 PM  RESPONDING  PROVIDER #:        Navjot Cox MD          QUERY TEXT:    Patient admitted with a fib. Noted documentation of NSTEMI by internal med   provider and demand ischemia by cardiologist.  If possible, please document in progress notes and discharge summary if you   are evaluating and /or treating any of the following: The medical record reflects the following:  Risk Factors: HTN, Diastolic heart failure  Clinical Indicators: Troponin 0.015, 0.018, Pro BNP 9,289 ECHO shows \"   Ejection fraction is visually estimated at 45-50%. Grade II diastolic dysfunction. \" Cardiology note on  states \"   Elevated   troponin, non-ACS secondary to A. fib RVR and renal failure\"  Treatment: ECHO, Cardiology consult, labs    Thank you,  Susan Steward [phone]  Options provided:  -- NSTEMI confirmed and demand ischemia ruled out  -- Demand ischemia confirmed and NSTEMI ruled out  -- Other - I will add my own diagnosis  -- Disagree - Not applicable / Not valid  -- Disagree - Clinically unable to determine / Unknown  -- Refer to Clinical Documentation Reviewer    PROVIDER RESPONSE TEXT:    After study, Demand ischemia confirmed and NSTEMI ruled out.     Query created by: Nena Hu on 2023 7:22 AM      Electronically signed by:  Navjot Cox MD 2023 7:28 AM

## 2023-01-19 NOTE — PROGRESS NOTES
Physician Progress Note      PATIENT:               Jared Crump  CSN #:                  [id number]  :                       3/26/1931  ADMIT DATE:       2023 7:53 PM  [address] DATE:        2023 1:00 PM  RESPONDING  PROVIDER #:        Asif Dean MD          QUERY TEXT:    Patient admitted with NSTEMI , noted to have new onset atrial fibrillation and   is maintained on Eliquis. If possible, please document in progress notes and   discharge summary if you are evaluating and/or treating any of the following: The medical record reflects the following:  Risk Factors: Ovarian cystic mass, elevated cancer antigen  Clinical Indicators: D/C summary states \"CHADS2 Vascc score of 3 started on   Lovenox 1 alina per KG twice daily, now transition to oral anticoagulation with   Eliquis. Domia Ou \"  Treatment: ECHO, Cardiology consult, labs    Thank you,  Angelene Brunner [phone]  Options provided:  -- Secondary hypercoagulable state in a patient with atrial fibrillation  -- Other - I will add my own diagnosis  -- Disagree - Not applicable / Not valid  -- Disagree - Clinically unable to determine / Unknown  -- Refer to Clinical Documentation Reviewer    PROVIDER RESPONSE TEXT:    This patient has secondary hypercoagulable state in a patient with atrial   fibrillation.     Query created by: Pam Lamb on 2023 7:21 AM      Electronically signed by:  Asif Dean MD 2023 7:28 AM

## 2023-01-26 ENCOUNTER — OFFICE VISIT (OUTPATIENT)
Dept: CARDIOLOGY CLINIC | Age: 88
End: 2023-01-26
Payer: MEDICARE

## 2023-01-26 VITALS
SYSTOLIC BLOOD PRESSURE: 108 MMHG | DIASTOLIC BLOOD PRESSURE: 72 MMHG | WEIGHT: 108.6 LBS | BODY MASS INDEX: 21.89 KG/M2 | HEIGHT: 59 IN | HEART RATE: 101 BPM

## 2023-01-26 DIAGNOSIS — I34.0 NONRHEUMATIC MITRAL VALVE REGURGITATION: ICD-10-CM

## 2023-01-26 DIAGNOSIS — I48.19 PERSISTENT ATRIAL FIBRILLATION (HCC): Primary | ICD-10-CM

## 2023-01-26 DIAGNOSIS — I50.22 CHRONIC SYSTOLIC HEART FAILURE (HCC): ICD-10-CM

## 2023-01-26 DIAGNOSIS — I10 ESSENTIAL HYPERTENSION: ICD-10-CM

## 2023-01-26 PROCEDURE — 1123F ACP DISCUSS/DSCN MKR DOCD: CPT | Performed by: INTERNAL MEDICINE

## 2023-01-26 PROCEDURE — 99214 OFFICE O/P EST MOD 30 MIN: CPT | Performed by: INTERNAL MEDICINE

## 2023-01-26 RX ORDER — HYDROXYZINE HCL 10 MG/5 ML
10 SOLUTION, ORAL ORAL 3 TIMES DAILY
COMMUNITY

## 2023-01-26 RX ORDER — DILTIAZEM HYDROCHLORIDE 180 MG/1
180 CAPSULE, COATED, EXTENDED RELEASE ORAL DAILY
Qty: 30 CAPSULE | Refills: 3 | Status: SHIPPED | OUTPATIENT
Start: 2023-01-26 | End: 2023-04-26

## 2023-01-26 RX ORDER — FUROSEMIDE 20 MG/1
20 TABLET ORAL EVERY OTHER DAY
Qty: 60 TABLET | Refills: 3 | Status: SHIPPED | OUTPATIENT
Start: 2023-01-26 | End: 2023-04-26

## 2023-01-26 NOTE — PROGRESS NOTES
Adam Andrews MD                                  CARDIOLOGY  NOTE         Chief Complaint:    Chief Complaint   Patient presents with    Thyroid Problem     No chest pain, dizziness, SOB , no swelling, palpitations    Follow-Up from Hospital        HPI:     Breezy is a 89 y.o. year old female who was recently evaluated in the hospital January 2023.    Patient was noted to have new onset atrial fibrillation on admission.  Cardiology   consulted to evaluate patient.     Patient is a 89-year-old female who lives with her niece.  She has been recently diagnosed with a ovarian cyst, and has deferred further investigation in light of advanced age.  Patient also has history of mild dementia and is otherwise fairly active.  For the past several days patient has been feeling generalized weakness with tachycardia and coughing.  Upon arrival to the hospital she was noted to have new onset atrial fibrillation with rapid ventricular response.        Patient was treated conservatively at the hospital with objective of rate control strategy and stroke prevention action      Current Outpatient Medications   Medication Sig Dispense Refill    hydrOXYzine (ATARAX) 10 MG/5ML syrup Take 10 mg by mouth 3 times daily      apixaban (ELIQUIS) 2.5 MG TABS tablet Take 1 tablet by mouth 2 times daily 60 tablet 3    metoprolol tartrate (LOPRESSOR) 25 MG tablet Take 0.5 tablets by mouth 2 times daily 60 tablet 3    dilTIAZem (CARDIZEM CD) 180 MG extended release capsule Take 1 capsule by mouth daily 30 capsule 3    furosemide (LASIX) 20 MG tablet Take 1 tablet by mouth every other day 60 tablet 3    levothyroxine (SYNTHROID) 75 MCG tablet Take 1 tablet by mouth Daily 30 tablet 3    omeprazole (PRILOSEC) 20 MG capsule Take 20 mg by mouth daily      ipratropium-albuterol (DUONEB) 0.5-2.5 (3) MG/3ML SOLN nebulizer solution Inhale 3 mLs into the lungs every 4 hours as needed for Shortness of Breath (While awake (4 doses max per day)) 360 mL 0  No current facility-administered medications for this visit. Allergies:     Patient has no known allergies. Patient History:    Past Medical History:   Diagnosis Date    Arthritis     Thyroid disease      Past Surgical History:   Procedure Laterality Date    APPENDECTOMY      CHOLECYSTECTOMY      EYE SURGERY      FRACTURE SURGERY      HYSTERECTOMY (CERVIX STATUS UNKNOWN)      TONSILLECTOMY       History reviewed. No pertinent family history. Social History     Tobacco Use    Smoking status: Never    Smokeless tobacco: Never   Substance Use Topics    Alcohol use: No        Review of Systems:     Constitutional:  No Fever or Weight Loss   Eyes: No Decreased Vision  ENT: No Headaches, Hearing Loss or Vertigo  Cardiovascular: No Chest Pain,  No Shortness of breath, No Palpitations. No Edema   Respiratory: No cough or wheezing . No Respiratory distress   Gastrointestinal: No abdominal pain, appetite loss, blood in stools, constipation, diarrhea or heartburn  Genitourinary: No dysuria, trouble voiding, or hematuria  Musculoskeletal:  denies any new  joint aches , or pain   Integumentary: No rash or pruritis  Neurological: No TIA or stroke symptoms  Psychiatric: No anxiety or depression  Endocrine: No malaise, fatigue or temperature intolerance  Hematologic/Lymphatic: No bleeding problems, blood clots or swollen lymph nodes  Allergic/Immunologic: No nasal congestion or hives        Objective:      Physical Exam:    /72   Pulse (!) 101   Ht 4' 11.32\" (1.507 m)   Wt 108 lb 9.6 oz (49.3 kg)   BMI 21.70 kg/m²   Wt Readings from Last 3 Encounters:   01/26/23 108 lb 9.6 oz (49.3 kg)   01/15/23 119 lb 14.9 oz (54.4 kg)   01/14/23 110 lb (49.9 kg)     Body mass index is 21.7 kg/m². Vitals:    01/26/23 1026   BP: 108/72   Pulse: (!) 101        General Appearance and Constitutional: Conversant, Well developed, Well nourished, No acute distress, Non-toxic appearance.    HEENT:  Normocephalic, Atraumatic, Bilateral external ears normal, Oropharynx moist, No oral exudates,   Nose normal.   Neck- Normal range of motion, No tenderness, Supple  Eyes:  EOMI, Conjunctiva normal, No discharge. Respiratory:  Normal breath sounds, No respiratory distress, No wheezing, No Rales, No Ronchi. No chest tenderness. Cardiovascular: S1-S2, IRIR no added heart sounds, +2 systolic Mumur appreciated. No gallops, rubs. No Pedal Edema   GI:  Bowel sounds normal, Soft, No tenderness,  :  No costovertebral angle tenderness   Musculoskeletal:  No gross deformities. Back- No tenderness  Integument:  Well hydrated, no rash   Lymphatic:  No lymphadenopathy noted   Neurologic:  Alert & oriented x 3, Normal motor function, normal sensory function, no focal deficits noted   Psychiatric:  Speech and behavior appropriate       Medical decision making and Data review:    DATA:    Lab Results   Component Value Date    TROPONINT 0.018 (H) 01/14/2023     BNP:    Lab Results   Component Value Date    PROBNP 9,289 (H) 01/14/2023     PT/INR:  No results found for: PTINR  No results found for: LABA1C  No results found for: CHOL, TRIG, HDL, LDLCALC, LDLDIRECT  Lab Results   Component Value Date    WBC 7.6 01/17/2023    HGB 12.5 01/17/2023    HCT 39.6 01/17/2023    MCV 92.7 01/17/2023     01/17/2023     TSH: No results found for: TSH  Lab Results   Component Value Date    AST 23 01/14/2023    ALT 12 01/14/2023    BILIDIR 0.2 01/12/2022    BILITOT 0.9 01/14/2023    ALKPHOS 93 01/14/2023         All labs, medications and tests reviewed by myself including data and history from outside source , patient and available family . 1. Persistent atrial fibrillation ([address]    2. Essential hypertension    3. Chronic systolic heart failure ([address]    4. Nonrheumatic mitral valve regurgitation         Impression and Plan:      Recent onset atrial fibrillation , persistent A.  Fib  Essential hypertension  Elevated troponin, non-ACS secondary to A. 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 Continue with Cardizem 180 mg p.o. daily  Continue with metoprolol tartrate 12.5 mg p.o. twice daily  Continue with Eliquis 2.5 mg p.o. twice daily. [id number] of 3    Patient is DNR [facility]      Return in about 6 months (around 7/26/2023).

## 2023-02-15 ENCOUNTER — HOSPITAL ENCOUNTER (EMERGENCY)
Age: 88
Discharge: HOME | End: 2023-02-15
Attending: EMERGENCY MEDICINE
Payer: MEDICARE

## 2023-02-15 ENCOUNTER — APPOINTMENT (OUTPATIENT)
Dept: GENERAL RADIOLOGY | Age: 88
End: 2023-02-15
Payer: MEDICARE

## 2023-02-15 VITALS
RESPIRATION RATE: 19 BRPM | HEIGHT: 59 IN | SYSTOLIC BLOOD PRESSURE: 115 MMHG | WEIGHT: 110 LBS | DIASTOLIC BLOOD PRESSURE: 86 MMHG | TEMPERATURE: 97.7 F | HEART RATE: 84 BPM | BODY MASS INDEX: 22.18 KG/M2 | OXYGEN SATURATION: 95 %

## 2023-02-15 DIAGNOSIS — I48.91 ATRIAL FIBRILLATION, UNSPECIFIED TYPE (HCC): Primary | ICD-10-CM

## 2023-02-15 LAB
ALBUMIN SERPL-MCNC: 4.1 GM/DL (ref 3.4–5)
ALP BLD-CCNC: 110 IU/L (ref 40–129)
ALT SERPL-CCNC: 20 U/L (ref 10–40)
ANION GAP SERPL CALCULATED.3IONS-SCNC: 12 MMOL/L (ref 4–16)
AST SERPL-CCNC: 29 IU/L (ref 15–37)
BASOPHILS ABSOLUTE: 0.1 K/CU MM
BASOPHILS RELATIVE PERCENT: 0.6 % (ref 0–1)
BILIRUB SERPL-MCNC: 0.2 MG/DL (ref 0–1)
BUN SERPL-MCNC: 33 MG/DL (ref 6–23)
CALCIUM SERPL-MCNC: 9.4 MG/DL (ref 8.3–10.6)
CHLORIDE BLD-SCNC: 106 MMOL/L (ref 99–110)
CO2: 24 MMOL/L (ref 21–32)
CREAT SERPL-MCNC: 1.1 MG/DL (ref 0.6–1.1)
DIFFERENTIAL TYPE: ABNORMAL
EKG DIAGNOSIS: NORMAL
EKG Q-T INTERVAL: 284 MS
EKG QRS DURATION: 106 MS
EKG QTC CALCULATION (BAZETT): 473 MS
EKG R AXIS: -45 DEGREES
EKG T AXIS: 132 DEGREES
EKG VENTRICULAR RATE: 167 BPM
EOSINOPHILS ABSOLUTE: 0.3 K/CU MM
EOSINOPHILS RELATIVE PERCENT: 3.3 % (ref 0–3)
GFR SERPL CREATININE-BSD FRML MDRD: 47 ML/MIN/1.73M2
GLUCOSE SERPL-MCNC: 118 MG/DL (ref 70–99)
HCT VFR BLD CALC: 48.2 % (ref 37–47)
HEMOGLOBIN: 15.4 GM/DL (ref 12.5–16)
IMMATURE NEUTROPHIL %: 0.3 % (ref 0–0.43)
LYMPHOCYTES ABSOLUTE: 2.7 K/CU MM
LYMPHOCYTES RELATIVE PERCENT: 28 % (ref 24–44)
MAGNESIUM: 2 MG/DL (ref 1.8–2.4)
MCH RBC QN AUTO: 29.6 PG (ref 27–31)
MCHC RBC AUTO-ENTMCNC: 32 % (ref 32–36)
MCV RBC AUTO: 92.5 FL (ref 78–100)
MONOCYTES ABSOLUTE: 0.9 K/CU MM
MONOCYTES RELATIVE PERCENT: 9.7 % (ref 0–4)
PDW BLD-RTO: 14 % (ref 11.7–14.9)
PLATELET # BLD: 280 K/CU MM (ref 140–440)
PMV BLD AUTO: 9.6 FL (ref 7.5–11.1)
POTASSIUM SERPL-SCNC: 4.6 MMOL/L (ref 3.5–5.1)
RBC # BLD: 5.21 M/CU MM (ref 4.2–5.4)
SEGMENTED NEUTROPHILS ABSOLUTE COUNT: 5.7 K/CU MM
SEGMENTED NEUTROPHILS RELATIVE PERCENT: 58.1 % (ref 36–66)
SODIUM BLD-SCNC: 142 MMOL/L (ref 135–145)
TOTAL IMMATURE NEUTOROPHIL: 0.03 K/CU MM
TOTAL PROTEIN: 7 GM/DL (ref 6.4–8.2)
TROPONIN T: <0.01 NG/ML
WBC # BLD: 9.7 K/CU MM (ref 4–10.5)

## 2023-02-15 PROCEDURE — 85025 COMPLETE CBC W/AUTO DIFF WBC: CPT

## 2023-02-15 PROCEDURE — 2500000003 HC RX 250 WO HCPCS: Performed by: EMERGENCY MEDICINE

## 2023-02-15 PROCEDURE — 96374 THER/PROPH/DIAG INJ IV PUSH: CPT

## 2023-02-15 PROCEDURE — 71045 X-RAY EXAM CHEST 1 VIEW: CPT

## 2023-02-15 PROCEDURE — 93005 ELECTROCARDIOGRAM TRACING: CPT | Performed by: EMERGENCY MEDICINE

## 2023-02-15 PROCEDURE — 99285 EMERGENCY DEPT VISIT HI MDM: CPT

## 2023-02-15 PROCEDURE — 83735 ASSAY OF MAGNESIUM: CPT

## 2023-02-15 PROCEDURE — 93010 ELECTROCARDIOGRAM REPORT: CPT | Performed by: INTERNAL MEDICINE

## 2023-02-15 PROCEDURE — 84484 ASSAY OF TROPONIN QUANT: CPT

## 2023-02-15 PROCEDURE — 80053 COMPREHEN METABOLIC PANEL: CPT

## 2023-02-15 PROCEDURE — 2580000003 HC RX 258: Performed by: EMERGENCY MEDICINE

## 2023-02-15 RX ORDER — DILTIAZEM HYDROCHLORIDE 5 MG/ML
10 INJECTION INTRAVENOUS ONCE
Status: COMPLETED | OUTPATIENT
Start: 2023-02-15 | End: 2023-02-15

## 2023-02-15 RX ADMIN — DILTIAZEM HYDROCHLORIDE 10 MG: 5 INJECTION, SOLUTION INTRAVENOUS at 15:44

## 2023-02-15 ASSESSMENT — LIFESTYLE VARIABLES: HOW OFTEN DO YOU HAVE A DRINK CONTAINING ALCOHOL: NEVER

## 2023-02-15 ASSESSMENT — PAIN - FUNCTIONAL ASSESSMENT: PAIN_FUNCTIONAL_ASSESSMENT: NONE - DENIES PAIN

## 2023-02-15 NOTE — ED PROVIDER NOTES
[address]        Pt Name: Riaz Peterson  MRN: [id number]  [place] 3/26/1931  Date of evaluation: 2/15/2023  Provider: Roberto House MD  PCP: Gordo De La Cruz DO  Note Started: 5:39 PM EST 2/15/23    CHIEF COMPLAINT       Chief Complaint   Patient presents with    Irregular Heart Beat     Pt arrives per ems from urgent care in Afib with RVR. Pt has hx of afib and was going to urgent care for follow up when they determined she was back in afib. Pt denies any sx at this time       HISTORY OF PRESENT ILLNESS: 1 or more Elements     History from : Patient, Family niece, and EMS    Limitations to history as patient is hard of hearing    Riaz Peterson is a 80 y.o. female who presents to the emergency department with irregular tachycardia. Patient is a poor historian and is very hard of hearing. EMS states that the patient was doing a follow-up today for recent admission for A-fib when they noted that she was in a rapid heart rate. Family is now at bedside as patient cannot really tell any history. They state that that she noticed this afternoon that the patient started becoming short of breath. They took her for follow-up at urgent care and noticed that she was in A-fib with RVR. She does take Eliquis, metoprolol and diltiazem and family reports compliance. Patient does appear tachypneic. Nursing Notes were all reviewed and agreed with or any disagreements were addressed in the HPI.     REVIEW OF SYSTEMS :      Review of Systems    10 systems reviewed and negative except as in HPI/MDM    SURGICAL HISTORY     Past Surgical History:   Procedure Laterality Date    APPENDECTOMY      CHOLECYSTECTOMY      EYE SURGERY      FRACTURE SURGERY      HYSTERECTOMY (CERVIX STATUS UNKNOWN)      TONSILLECTOMY         CURRENTMEDICATIONS       Discharge Medication List as of 2/15/2023  5:42 PM        CONTINUE these medications which have NOT CHANGED Details   hydrOXYzine (ATARAX) 10 MG/5ML syrup Take 10 mg by mouth 3 times dailyHistorical Med      furosemide (LASIX) 20 MG tablet Take 1 tablet by mouth every other day, Disp-60 tablet, R-3Normal      dilTIAZem (CARDIZEM CD) 180 MG extended release capsule Take 1 capsule by mouth daily, Disp-30 capsule, R-3Normal      apixaban (ELIQUIS) 2.5 MG TABS tablet Take 1 tablet by mouth 2 times daily, Disp-60 tablet, R-3Normal      metoprolol tartrate (LOPRESSOR) 25 MG tablet Take 0.5 tablets by mouth 2 times daily, Disp-90 tablet, R-3Normal      levothyroxine (SYNTHROID) 75 MCG tablet Take 1 tablet by mouth Daily, Disp-30 tablet, R-3Normal      ipratropium-albuterol (DUONEB) 0.5-2.5 (3) MG/3ML SOLN nebulizer solution Inhale 3 mLs into the lungs every 4 hours as needed for Shortness of Breath (While awake (4 doses max per day)), Disp-360 mL, R-0Normal      omeprazole (PRILOSEC) 20 MG capsule Take 20 mg by mouth daily             ALLERGIES     Patient has no known allergies. FAMILYHISTORY     History reviewed. No pertinent family history. SOCIAL HISTORY       Social History     Tobacco Use    Smoking status: Never    Smokeless tobacco: Never   Vaping Use    Vaping Use: Never used   Substance Use Topics    Alcohol use: No    Drug use: No       SCREENINGS        [place] Coma Scale  Eye Opening: Spontaneous  Best Verbal Response: Oriented  Best Motor Response: Obeys commands  [place] Coma Scale Score: 15                CIWA Assessment  BP: 115/86  Heart Rate: 84           PHYSICAL EXAM  1 or more Elements     ED Triage Vitals [02/15/23 1530]   BP Temp Temp Source Heart Rate Resp SpO2 Height Weight   (!) 119/98 97.7 °F (36.5 °C) Oral (!) 177 (!) 33 95 % 4' 11\" (1.499 m) 110 lb (49.9 kg)       Physical Exam    My pulse oximetry interpretation is which is within the normal range    GENERAL APPEARANCE: Awake and alert. Cooperative. No acute distress. HEAD:  Atraumatic. EYES: EOM's grossly intact.    ENT: Mucous membranes are moist.  No trismus. NECK:  Trachea midline. HEART: Irregular irregular tachycardia  LUNGS: Respirations slightly labored. Clear to auscultation bilaterally  ABDOMEN: Soft. Non-tender. No guarding or rebound. EXTREMITIES: No acute deformities. SKIN: Warm and dry. NEUROLOGICAL: Moves all 4 extremities spontaneously. PSYCHIATRIC: Normal mood. DIAGNOSTIC RESULTS   LABS:    Labs Reviewed   COMPREHENSIVE METABOLIC PANEL - Abnormal; Notable for the following components:       Result Value    BUN 33 (*)     Est, Glom Filt Rate 47 (*)     Glucose 118 (*)     All other components within normal limits   CBC WITH AUTO DIFFERENTIAL - Abnormal; Notable for the following components:    Hematocrit 48.2 (*)     Monocytes % 9.7 (*)     Eosinophils % 3.3 (*)     All other components within normal limits   TROPONIN   MAGNESIUM       When ordered only abnormal lab results are displayed. All other labs were within normal range or not returned as of this dictation. EK lead EKG:  Atrial Fibrillation 167  Axis is   Left axis deviation  QTc is  normal  There is no specific ST-T wave changes appreciated. There is no clear evidence of acute ischemia or infarction. It was compared against an EKG from 23. RADIOLOGY:   Non-plain film images such as CT, Ultrasound and MRI are read by the radiologist. Plain radiographic images are visualized and preliminarily interpreted by the ED Provider with the below findings:        Interpretation per the Radiologist below, if available at the time of this note:    XR CHEST PORTABLE   Final Result   No active cardiopulmonary process.            XR CHEST PORTABLE    Result Date: 2/15/2023  EXAMINATION: ONE XRAY VIEW OF THE CHEST 2/15/2023 4:14 pm COMPARISON: 2023 HISTORY: ORDERING SYSTEM PROVIDED HISTORY: shortness of breath TECHNOLOGIST PROVIDED HISTORY: Reason for exam:->shortness of breath Reason for Exam: shortness of breath Additional signs and symptoms: shortness of breath Relevant Medical/Surgical History: shortness of breath FINDINGS: Cardiac silhouette is within normal limits. Pulmonary vasculature is within normal limits. The lungs are clear. No pneumothorax is identified. Bony structures are unremarkable. No active cardiopulmonary process. No results found. PROCEDURES   Unless otherwise noted below, none     Procedures    CRITICAL CARE TIME       EMERGENCY DEPARTMENT COURSE and DIFFERENTIAL DIAGNOSIS/MDM:   Vitals:    Vitals:    02/15/23 1630 02/15/23 1645 02/15/23 1700 02/15/23 1715   BP: (!) 136/90 130/85 (!) 148/89 115/86   Pulse: 95 92 87 84   Resp: 22 25 26 19   Temp:       TempSrc:       SpO2: 94% 94% 96% 95%   Weight:       Height:           Patient was given the following medications:  Medications   dilTIAZem injection 10 mg (10 mg IntraVENous Given 2/15/23 1544)     Followed by   dilTIAZem 100 mg in sodium chloride 0.9 % 100 mL infusion (ADD-Swink) (0 mg/hr IntraVENous Held 2/15/23 1547)             Is this patient to be included in the SEP-1 Core Measure due to severe sepsis or septic shock? No   Exclusion criteria - the patient is NOT to be included for SEP-1 Core Measure due to: Infection is not suspected    PAST MEDICAL HISTORY      has a past medical history of A-fib ([address] Arthritis, and Thyroid disease. CC/HPI Summary, DDx, ED Course, and Reassessment: Kole Thompson is a 80 y.o. female who presents to the emergency department with irregular tachycardia. Patient is a poor historian and is very hard of hearing. EMS states that the patient was doing a follow-up today for recent admission for A-fib when they noted that she was in a rapid heart rate. Family is now at bedside as patient cannot really tell any history. They state that that she noticed this afternoon that the patient started becoming short of breath. They took her for follow-up at urgent care and noticed that she was in A-fib with RVR.   She does take Eliquis, metoprolol and diltiazem and family reports compliance. Patient does appear tachypneic. Patient he is here with A-fib with RVR. Her heart rate is in the 170s. She does appear tachypneic. I did give her 10 mg of IV diltiazem. This quickly took her heart rate from A-fib with RVR to rate controlled A-fib. Electrolytes and chest x-ray are normal.  Her breathing improved almost immediately and is likely due to the strain on her heart from A-fib. I discussed with Dr. Patrice Beyer, her cardiologist via perfect serve. He is agreeable with plan for discharge home with outpatient follow-up. Family is very agreeable with this. Patient would like to go home as well and is discharged at this time. Family instructed on strict return precautions to the emergency department. She will also follow-up with a cardiologist.    Chronic conditions include arthritis, dementia, A-fib and thyroid disease. I did consider admission however given that the patient is stable after 1 bolus one-time of Cardizem and electrolytes are unremarkable I believe she safe for discharge home. I had shared decision-making with the family and caregivers were agreeable as well. I am the Primary Clinician of Record. FINAL IMPRESSION      1.  Atrial fibrillation, unspecified type [facility])          DISPOSITION/PLAN     DISPOSITION Decision To Discharge 02/15/2023 05:40:31 PM      PATIENT REFERRED TO:  Trinity Petty MD  [address] [facility] [address]  [phone]    Call in 1 day      DISCHARGE MEDICATIONS:  Discharge Medication List as of 2/15/2023  5:42 PM          DISCONTINUED MEDICATIONS:  Discharge Medication List as of 2/15/2023  5:42 PM                 (Please note that portions of this note were completed with a voice recognition program.  Efforts were made to edit the dictations but occasionally words are mis-transcribed.)    Jeremiah Serrano MD (electronically signed)            Dora Chang MD  02/15/23 2316

## 2023-02-24 ENCOUNTER — TELEPHONE (OUTPATIENT)
Dept: CARDIOLOGY CLINIC | Age: 88
End: 2023-02-24

## 2023-02-24 NOTE — TELEPHONE ENCOUNTER
Dipti w/ [facility] reji asking for us to call katherine Mcrae , she has questions about her metoprolol, heart rate today was in 140's this morning

## 2023-02-27 NOTE — TELEPHONE ENCOUNTER
Called patients emergency contact [facility] and spoke to her about the patient. This heart rate of 140 was a one time episode. She has been newly diagnosed with A-fib and she had talked  to [address] and they advised her to increase her metoprolol when this happens which she did not do. The heart rate came down into the 70's in about 10 min. Instructed her that if it goes up and stays up please give us a call back we may need to adjust her medications. Milo HAQUE verbalized understanding of all information given.

## 2023-03-28 RX ORDER — DILTIAZEM HYDROCHLORIDE 180 MG/1
CAPSULE, COATED, EXTENDED RELEASE ORAL
Qty: 90 CAPSULE | Refills: 1 | Status: SHIPPED | OUTPATIENT
Start: 2023-03-28

## 2023-06-05 RX ORDER — APIXABAN 2.5 MG/1
TABLET, FILM COATED ORAL
Qty: 60 TABLET | Refills: 3 | Status: SHIPPED | OUTPATIENT
Start: 2023-06-05

## 2023-07-21 ENCOUNTER — APPOINTMENT (OUTPATIENT)
Dept: CT IMAGING | Age: 88
End: 2023-07-21
Payer: MEDICARE

## 2023-07-21 ENCOUNTER — APPOINTMENT (OUTPATIENT)
Dept: GENERAL RADIOLOGY | Age: 88
End: 2023-07-21
Payer: MEDICARE

## 2023-07-21 ENCOUNTER — HOSPITAL ENCOUNTER (EMERGENCY)
Age: 88
Discharge: HOME | End: 2023-07-22
Attending: EMERGENCY MEDICINE
Payer: MEDICARE

## 2023-07-21 DIAGNOSIS — R41.0 CONFUSION: ICD-10-CM

## 2023-07-21 DIAGNOSIS — N39.0 URINARY TRACT INFECTION WITHOUT HEMATURIA, SITE UNSPECIFIED: Primary | ICD-10-CM

## 2023-07-21 LAB
ALBUMIN SERPL-MCNC: 3.7 GM/DL (ref 3.4–5)
ALP BLD-CCNC: 122 IU/L (ref 40–129)
ALT SERPL-CCNC: <5 U/L (ref 10–40)
ANION GAP SERPL CALCULATED.3IONS-SCNC: 12 MMOL/L (ref 4–16)
AST SERPL-CCNC: 21 IU/L (ref 15–37)
BASOPHILS ABSOLUTE: 0.1 K/CU MM
BASOPHILS RELATIVE PERCENT: 0.5 % (ref 0–1)
BILIRUB SERPL-MCNC: 0.4 MG/DL (ref 0–1)
BUN SERPL-MCNC: 21 MG/DL (ref 6–23)
CALCIUM SERPL-MCNC: 9.4 MG/DL (ref 8.3–10.6)
CHLORIDE BLD-SCNC: 108 MMOL/L (ref 99–110)
CO2: 22 MMOL/L (ref 21–32)
CREAT SERPL-MCNC: 1.3 MG/DL (ref 0.6–1.1)
DIFFERENTIAL TYPE: ABNORMAL
EOSINOPHILS ABSOLUTE: 0.1 K/CU MM
EOSINOPHILS RELATIVE PERCENT: 0.9 % (ref 0–3)
GFR SERPL CREATININE-BSD FRML MDRD: 39 ML/MIN/1.73M2
GLUCOSE SERPL-MCNC: 123 MG/DL (ref 70–99)
HCT VFR BLD CALC: 47.7 % (ref 37–47)
HEMOGLOBIN: 15.1 GM/DL (ref 12.5–16)
IMMATURE NEUTROPHIL %: 0.2 % (ref 0–0.43)
LYMPHOCYTES ABSOLUTE: 2.3 K/CU MM
LYMPHOCYTES RELATIVE PERCENT: 24 % (ref 24–44)
MCH RBC QN AUTO: 29.1 PG (ref 27–31)
MCHC RBC AUTO-ENTMCNC: 31.7 % (ref 32–36)
MCV RBC AUTO: 91.9 FL (ref 78–100)
MONOCYTES ABSOLUTE: 0.9 K/CU MM
MONOCYTES RELATIVE PERCENT: 9.1 % (ref 0–4)
PDW BLD-RTO: 14.1 % (ref 11.7–14.9)
PLATELET # BLD: 248 K/CU MM (ref 140–440)
PMV BLD AUTO: 9.7 FL (ref 7.5–11.1)
POTASSIUM SERPL-SCNC: 4.9 MMOL/L (ref 3.5–5.1)
RBC # BLD: 5.19 M/CU MM (ref 4.2–5.4)
SEGMENTED NEUTROPHILS ABSOLUTE COUNT: 6.3 K/CU MM
SEGMENTED NEUTROPHILS RELATIVE PERCENT: 65.3 % (ref 36–66)
SODIUM BLD-SCNC: 142 MMOL/L (ref 135–145)
TOTAL IMMATURE NEUTOROPHIL: 0.02 K/CU MM
TOTAL PROTEIN: 6.8 GM/DL (ref 6.4–8.2)
WBC # BLD: 9.7 K/CU MM (ref 4–10.5)

## 2023-07-21 PROCEDURE — 85025 COMPLETE CBC W/AUTO DIFF WBC: CPT

## 2023-07-21 PROCEDURE — 87086 URINE CULTURE/COLONY COUNT: CPT

## 2023-07-21 PROCEDURE — 99284 EMERGENCY DEPT VISIT MOD MDM: CPT

## 2023-07-21 PROCEDURE — 71045 X-RAY EXAM CHEST 1 VIEW: CPT

## 2023-07-21 PROCEDURE — 81001 URINALYSIS AUTO W/SCOPE: CPT

## 2023-07-21 PROCEDURE — 70450 CT HEAD/BRAIN W/O DYE: CPT

## 2023-07-21 PROCEDURE — 80053 COMPREHEN METABOLIC PANEL: CPT

## 2023-07-21 RX ORDER — 0.9 % SODIUM CHLORIDE 0.9 %
500 INTRAVENOUS SOLUTION INTRAVENOUS ONCE
Status: DISCONTINUED | OUTPATIENT
Start: 2023-07-21 | End: 2023-07-22 | Stop reason: HOSPADM

## 2023-07-21 ASSESSMENT — PAIN SCALES - GENERAL: PAINLEVEL_OUTOF10: 0

## 2023-07-21 ASSESSMENT — PAIN - FUNCTIONAL ASSESSMENT: PAIN_FUNCTIONAL_ASSESSMENT: 0-10

## 2023-07-22 VITALS
RESPIRATION RATE: 16 BRPM | SYSTOLIC BLOOD PRESSURE: 102 MMHG | TEMPERATURE: 97.7 F | HEIGHT: 59 IN | DIASTOLIC BLOOD PRESSURE: 80 MMHG | BODY MASS INDEX: 22.18 KG/M2 | HEART RATE: 66 BPM | OXYGEN SATURATION: 98 % | WEIGHT: 110 LBS

## 2023-07-22 LAB
BACTERIA: ABNORMAL /HPF
BILIRUBIN URINE: NEGATIVE MG/DL
BLOOD, URINE: ABNORMAL
CAST TYPE: ABNORMAL /HPF
CLARITY: ABNORMAL
COLOR: YELLOW
COMMENT UA: ABNORMAL
CRYSTAL TYPE: NEGATIVE /HPF
EPITHELIAL CELLS, UA: 5 /HPF
GLUCOSE, URINE: NEGATIVE MG/DL
KETONES, URINE: NEGATIVE MG/DL
LEUKOCYTE ESTERASE, URINE: ABNORMAL
NITRITE URINE, QUANTITATIVE: NEGATIVE
PH, URINE: 6 (ref 5–8)
PROTEIN UA: ABNORMAL MG/DL
RBC URINE: 0 /HPF (ref 0–6)
SPECIFIC GRAVITY UA: >1.03 (ref 1–1.03)
UROBILINOGEN, URINE: 0.2 MG/DL (ref 0.2–1)
WBC UA: 20 /HPF (ref 0–5)

## 2023-07-22 PROCEDURE — 6370000000 HC RX 637 (ALT 250 FOR IP): Performed by: EMERGENCY MEDICINE

## 2023-07-22 RX ORDER — CEPHALEXIN 500 MG/1
500 CAPSULE ORAL ONCE
Status: COMPLETED | OUTPATIENT
Start: 2023-07-22 | End: 2023-07-22

## 2023-07-22 RX ORDER — CEPHALEXIN 500 MG/1
500 CAPSULE ORAL 2 TIMES DAILY
Qty: 20 CAPSULE | Refills: 0 | Status: SHIPPED | OUTPATIENT
Start: 2023-07-22 | End: 2023-08-01

## 2023-07-22 RX ADMIN — CEPHALEXIN 500 MG: 500 CAPSULE ORAL at 00:47

## 2023-07-22 NOTE — DISCHARGE INSTRUCTIONS
Your urine test today was suspicious for urinary tract infection. Urine culture is being sent. Please have your primary care physician follow the urine culture. You are being placed on antibiotics. If you develop any worsening or concerning symptoms, please seek immediate medical evaluation.

## 2023-07-22 NOTE — ED PROVIDER NOTES
[address] ENCOUNTER      Pt Name: Yovanny Carver  MRN: [id number]  [facility] 3/26/1931  Date of evaluation: 7/21/2023  Provider: Kyleigh Blas MD    CHIEF COMPLAINT       Chief Complaint   Patient presents with    Altered Mental Status     H/o UTI, intermittent confusion since this afternoon          HISTORY OF PRESENT ILLNESS      Yovanny Carver is a 80 y.o. female who presents to the emergency department  for   Chief Complaint   Patient presents with    Altered Mental Status     H/o UTI, intermittent confusion since this afternoon        80-year-old female presents for reported altered mental status. She presents with family who provides collateral information. Family reports that today she was demonstrating some confusion. No report that she had any recent infectious symptoms like fever or chills. Has not had any acute complaints. No report of any focal neurodeficits or lateralizing symptoms. No falls, trauma or injury. She was taken initially to urgent care but was not able to provide a urine sample. She was sent to the emergency department for further evaluation. She presents ambulatory. She is answering simple questions and following commands. No signs of respiratory distress. She does not have any acute complaints. Family is at bedside for collateral information. Nursing Notes, Triage Notes & Vital Signs were reviewed. REVIEW OF SYSTEMS    (2-9 systems for level 4, 10 or more for level 5)     Review of Systems    Except as noted above the remainder of the review of systems was reviewed and negative. PAST MEDICAL HISTORY     Past Medical History:   Diagnosis Date    A-fib [facility])     Arthritis     Thyroid disease        Prior to Admission medications    Medication Sig Start Date End Date Taking?  Authorizing Provider   cephALEXin (KEFLEX) 500 MG capsule Take 1 capsule by mouth 2 times daily for 10 days 7/22/23 8/1/23 Yes Abnormal; Notable for the following components:    WBC, UA 20 (*)     Bacteria, UA FEW (*)     All other components within normal limits   CULTURE, URINE          RADIOLOGY:     Non-plain film images such as CT, Ultrasound and MRI are read by the radiologist. Plain radiographic images are visualized and preliminarily interpreted by the emergency physician. Interpretation per the Radiologist below, if available at the time of this note:    CT HEAD WO CONTRAST   Final Result   1. Stable CT scan of the head without acute intracranial abnormality. 2.  Generalized age-related cortical atrophy, with intracranial   atherosclerosis and CT findings suggestive of chronic microvascular ischemic   change. XR CHEST PORTABLE   Final Result   1. No acute pulmonary disease. 2. Senescent pulmonary changes. 3. Calcific atherosclerosis aorta. 4. Cardiomegaly. 5. Possible small hiatal hernia.                ED BEDSIDE ULTRASOUND:   Performed by ED Physician Chelo Garcia MD       LABS:  Labs Reviewed   COMPREHENSIVE METABOLIC PANEL - Abnormal; Notable for the following components:       Result Value    Creatinine 1.3 (*)     Est, Glom Filt Rate 39 (*)     Glucose 123 (*)     ALT <5 (*)     All other components within normal limits   CBC WITH AUTO DIFFERENTIAL - Abnormal; Notable for the following components:    Hematocrit 47.7 (*)     MCHC 31.7 (*)     Monocytes % 9.1 (*)     All other components within normal limits   URINALYSIS - Abnormal; Notable for the following components:    Clarity, UA SLIGHTLY CLOUDY (*)     Blood, Urine SMALL NUMBER OR AMOUNT OBSERVED (*)     Protein, UA TRACE (*)     Leukocyte Esterase, Urine MODERATE NUMBER OR AMOUNT OBSERVED (*)     All other components within normal limits   MICROSCOPIC URINALYSIS - Abnormal; Notable for the following components:    WBC, UA 20 (*)     Bacteria, UA FEW (*)     All other components within normal limits   CULTURE, URINE       All other labs were

## 2023-07-22 NOTE — ED NOTES
Patient does not have IV access. Blood specimen was obtained by venopuncture. Patient given water to drink and family states they will help her take a sip every five minutes or so.      Vanessa Medina RN  07/22/23 8122

## 2023-07-23 LAB
CULTURE: NORMAL
Lab: NORMAL
SPECIMEN: NORMAL

## 2023-10-23 RX ORDER — APIXABAN 2.5 MG/1
TABLET, FILM COATED ORAL
Qty: 60 TABLET | Refills: 3 | Status: SHIPPED | OUTPATIENT
Start: 2023-10-23

## 2023-11-16 ENCOUNTER — HOSPITAL ENCOUNTER (EMERGENCY)
Age: 88
Discharge: HOME | End: 2023-11-16
Attending: EMERGENCY MEDICINE
Payer: MEDICARE

## 2023-11-16 ENCOUNTER — APPOINTMENT (OUTPATIENT)
Dept: GENERAL RADIOLOGY | Age: 88
End: 2023-11-16
Payer: MEDICARE

## 2023-11-16 VITALS
BODY MASS INDEX: 22.18 KG/M2 | WEIGHT: 110 LBS | HEART RATE: 96 BPM | DIASTOLIC BLOOD PRESSURE: 98 MMHG | TEMPERATURE: 98.5 F | HEIGHT: 59 IN | SYSTOLIC BLOOD PRESSURE: 136 MMHG | RESPIRATION RATE: 23 BRPM | OXYGEN SATURATION: 99 %

## 2023-11-16 DIAGNOSIS — I48.91 ATRIAL FIBRILLATION WITH RAPID VENTRICULAR RESPONSE (HCC): Primary | ICD-10-CM

## 2023-11-16 LAB
ALBUMIN SERPL-MCNC: 3.9 GM/DL (ref 3.4–5)
ALP BLD-CCNC: 110 IU/L (ref 40–129)
ALT SERPL-CCNC: 17 U/L (ref 10–40)
ANION GAP SERPL CALCULATED.3IONS-SCNC: 13 MMOL/L (ref 4–16)
AST SERPL-CCNC: 23 IU/L (ref 15–37)
BASOPHILS ABSOLUTE: 0.1 K/CU MM
BASOPHILS RELATIVE PERCENT: 0.7 % (ref 0–1)
BILIRUB SERPL-MCNC: 0.4 MG/DL (ref 0–1)
BUN SERPL-MCNC: 18 MG/DL (ref 6–23)
CALCIUM SERPL-MCNC: 9 MG/DL (ref 8.3–10.6)
CHLORIDE BLD-SCNC: 107 MMOL/L (ref 99–110)
CO2: 24 MMOL/L (ref 21–32)
CREAT SERPL-MCNC: 1 MG/DL (ref 0.6–1.1)
DIFFERENTIAL TYPE: ABNORMAL
EOSINOPHILS ABSOLUTE: 0.3 K/CU MM
EOSINOPHILS RELATIVE PERCENT: 3.4 % (ref 0–3)
GFR SERPL CREATININE-BSD FRML MDRD: 53 ML/MIN/1.73M2
GLUCOSE SERPL-MCNC: 130 MG/DL (ref 70–99)
HCT VFR BLD CALC: 47.8 % (ref 37–47)
HEMOGLOBIN: 15.3 GM/DL (ref 12.5–16)
IMMATURE NEUTROPHIL %: 0.2 % (ref 0–0.43)
LYMPHOCYTES ABSOLUTE: 2.6 K/CU MM
LYMPHOCYTES RELATIVE PERCENT: 31.8 % (ref 24–44)
MAGNESIUM: 2 MG/DL (ref 1.8–2.4)
MCH RBC QN AUTO: 29.8 PG (ref 27–31)
MCHC RBC AUTO-ENTMCNC: 32 % (ref 32–36)
MCV RBC AUTO: 93.2 FL (ref 78–100)
MONOCYTES ABSOLUTE: 0.9 K/CU MM
MONOCYTES RELATIVE PERCENT: 11.5 % (ref 0–4)
PDW BLD-RTO: 14.6 % (ref 11.7–14.9)
PLATELET # BLD: 227 K/CU MM (ref 140–440)
PMV BLD AUTO: 9.7 FL (ref 7.5–11.1)
POTASSIUM SERPL-SCNC: 4.1 MMOL/L (ref 3.5–5.1)
PRO-BNP: 2840 PG/ML
RBC # BLD: 5.13 M/CU MM (ref 4.2–5.4)
SEGMENTED NEUTROPHILS ABSOLUTE COUNT: 4.3 K/CU MM
SEGMENTED NEUTROPHILS RELATIVE PERCENT: 52.4 % (ref 36–66)
SODIUM BLD-SCNC: 144 MMOL/L (ref 135–145)
TOTAL IMMATURE NEUTOROPHIL: 0.02 K/CU MM
TOTAL PROTEIN: 7.2 GM/DL (ref 6.4–8.2)
TROPONIN, HIGH SENSITIVITY: 9 NG/L (ref 0–14)
TROPONIN, HIGH SENSITIVITY: 9 NG/L (ref 0–14)
WBC # BLD: 8.2 K/CU MM (ref 4–10.5)

## 2023-11-16 PROCEDURE — 2500000003 HC RX 250 WO HCPCS: Performed by: EMERGENCY MEDICINE

## 2023-11-16 PROCEDURE — 96366 THER/PROPH/DIAG IV INF ADDON: CPT

## 2023-11-16 PROCEDURE — 96365 THER/PROPH/DIAG IV INF INIT: CPT

## 2023-11-16 PROCEDURE — 71045 X-RAY EXAM CHEST 1 VIEW: CPT

## 2023-11-16 PROCEDURE — 96376 TX/PRO/DX INJ SAME DRUG ADON: CPT

## 2023-11-16 PROCEDURE — 99284 EMERGENCY DEPT VISIT MOD MDM: CPT

## 2023-11-16 PROCEDURE — 93005 ELECTROCARDIOGRAM TRACING: CPT | Performed by: EMERGENCY MEDICINE

## 2023-11-16 PROCEDURE — 96375 TX/PRO/DX INJ NEW DRUG ADDON: CPT

## 2023-11-16 PROCEDURE — 83735 ASSAY OF MAGNESIUM: CPT

## 2023-11-16 PROCEDURE — 85025 COMPLETE CBC W/AUTO DIFF WBC: CPT

## 2023-11-16 PROCEDURE — 6360000002 HC RX W HCPCS: Performed by: EMERGENCY MEDICINE

## 2023-11-16 PROCEDURE — 80053 COMPREHEN METABOLIC PANEL: CPT

## 2023-11-16 PROCEDURE — 84484 ASSAY OF TROPONIN QUANT: CPT

## 2023-11-16 PROCEDURE — 83880 ASSAY OF NATRIURETIC PEPTIDE: CPT

## 2023-11-16 RX ORDER — DILTIAZEM HYDROCHLORIDE 5 MG/ML
10 INJECTION INTRAVENOUS ONCE
Status: COMPLETED | OUTPATIENT
Start: 2023-11-16 | End: 2023-11-16

## 2023-11-16 RX ORDER — FUROSEMIDE 10 MG/ML
20 INJECTION INTRAMUSCULAR; INTRAVENOUS ONCE
Status: COMPLETED | OUTPATIENT
Start: 2023-11-16 | End: 2023-11-16

## 2023-11-16 RX ORDER — 0.9 % SODIUM CHLORIDE 0.9 %
1000 INTRAVENOUS SOLUTION INTRAVENOUS ONCE
Status: DISCONTINUED | OUTPATIENT
Start: 2023-11-16 | End: 2023-11-16

## 2023-11-16 RX ADMIN — DEXTROSE MONOHYDRATE 5 MG/HR: 50 INJECTION, SOLUTION INTRAVENOUS at 15:42

## 2023-11-16 RX ADMIN — FUROSEMIDE 20 MG: 10 INJECTION, SOLUTION INTRAMUSCULAR; INTRAVENOUS at 17:47

## 2023-11-16 RX ADMIN — DILTIAZEM HYDROCHLORIDE 10 MG: 5 INJECTION, SOLUTION INTRAVENOUS at 15:39

## 2023-11-16 ASSESSMENT — PAIN - FUNCTIONAL ASSESSMENT: PAIN_FUNCTIONAL_ASSESSMENT: 0-10

## 2023-11-16 ASSESSMENT — PAIN SCALES - GENERAL: PAINLEVEL_OUTOF10: 0

## 2023-11-17 LAB
EKG ATRIAL RATE: 1 BPM
EKG DIAGNOSIS: NORMAL
EKG Q-T INTERVAL: 266 MS
EKG QRS DURATION: 102 MS
EKG QTC CALCULATION (BAZETT): 395 MS
EKG R AXIS: -54 DEGREES
EKG T AXIS: 119 DEGREES
EKG VENTRICULAR RATE: 133 BPM

## 2023-11-22 ENCOUNTER — OFFICE VISIT (OUTPATIENT)
Dept: CARDIOLOGY CLINIC | Age: 88
End: 2023-11-22
Payer: MEDICARE

## 2023-11-22 VITALS
HEART RATE: 93 BPM | SYSTOLIC BLOOD PRESSURE: 122 MMHG | DIASTOLIC BLOOD PRESSURE: 80 MMHG | HEIGHT: 59 IN | BODY MASS INDEX: 21.13 KG/M2 | WEIGHT: 104.8 LBS

## 2023-11-22 DIAGNOSIS — I38 VALVULAR HEART DISEASE: ICD-10-CM

## 2023-11-22 DIAGNOSIS — I10 ESSENTIAL HYPERTENSION: ICD-10-CM

## 2023-11-22 DIAGNOSIS — I50.22 CHRONIC SYSTOLIC HEART FAILURE (HCC): ICD-10-CM

## 2023-11-22 DIAGNOSIS — I48.19 PERSISTENT ATRIAL FIBRILLATION (HCC): Primary | ICD-10-CM

## 2023-11-22 PROCEDURE — 99214 OFFICE O/P EST MOD 30 MIN: CPT | Performed by: INTERNAL MEDICINE

## 2023-11-22 PROCEDURE — 1123F ACP DISCUSS/DSCN MKR DOCD: CPT | Performed by: INTERNAL MEDICINE

## 2023-11-22 PROCEDURE — 93000 ELECTROCARDIOGRAM COMPLETE: CPT | Performed by: INTERNAL MEDICINE

## 2023-11-22 RX ORDER — DILTIAZEM HYDROCHLORIDE 240 MG/1
240 CAPSULE, COATED, EXTENDED RELEASE ORAL DAILY
Qty: 30 CAPSULE | Refills: 3 | Status: SHIPPED | OUTPATIENT
Start: 2023-11-22 | End: 2024-03-21

## 2023-11-22 NOTE — PATIENT INSTRUCTIONS
We are committed to providing you the best care possible. If you receive a survey after visiting one of our offices, please take time to share your experience concerning your physician office visit. These surveys are confidential and no health information about you is shared. We are eager to improve for you and we are counting on your feedback to help make that happen. **It is YOUR responsibilty to bring medication bottles and/or updated medication list to [facility]. This will allow us to better serve you and all your healthcare needs**  Thank you for allowing us to care for you today! We want to ensure we can follow your treatment plan and we strive to give you the best outcomes and experience possible. If you ever have a life threatening emergency and call 911 - for an ambulance (EMS)   Our providers can only care for you at:   [facility] or [facility]. Even if you have someone take you or you drive yourself we can only care for you in a [facility]. Our providers are not setup at the other healthcare locations! Please be informed that if you contact our office outside of normal business hours the physician on call cannot help with any scheduling or rescheduling issues, procedure instruction questions or any type of medication issue. We advise you for any urgent/emergency that you go to the nearest emergency room!     PLEASE CALL OUR OFFICE DURING NORMAL BUSINESS HOURS    Monday - Friday   8 am to 5 pm    [place]: [address] [place]: [phone]    [place]:  [phone]

## 2024-02-10 ENCOUNTER — APPOINTMENT (OUTPATIENT)
Dept: GENERAL RADIOLOGY | Age: 89
DRG: 308 | End: 2024-02-10
Payer: MEDICARE

## 2024-02-10 ENCOUNTER — HOSPITAL ENCOUNTER (INPATIENT)
Age: 89
LOS: 3 days | Discharge: HOME HEALTH | DRG: 308 | End: 2024-02-13
Attending: EMERGENCY MEDICINE | Admitting: HOSPITALIST
Payer: MEDICARE

## 2024-02-10 DIAGNOSIS — I50.9 ACUTE ON CHRONIC CONGESTIVE HEART FAILURE, UNSPECIFIED HEART FAILURE TYPE (HCC): ICD-10-CM

## 2024-02-10 DIAGNOSIS — I48.91 ATRIAL FIBRILLATION WITH RVR (HCC): Primary | ICD-10-CM

## 2024-02-10 DIAGNOSIS — J10.1 INFLUENZA A: ICD-10-CM

## 2024-02-10 LAB
ALBUMIN SERPL-MCNC: 4.1 GM/DL (ref 3.4–5)
ALP BLD-CCNC: 98 IU/L (ref 40–129)
ALT SERPL-CCNC: 16 U/L (ref 10–40)
ANION GAP SERPL CALCULATED.3IONS-SCNC: 17 MMOL/L (ref 7–16)
AST SERPL-CCNC: 28 IU/L (ref 15–37)
BACTERIA: NEGATIVE /HPF
BILIRUB SERPL-MCNC: 0.6 MG/DL (ref 0–1)
BILIRUBIN URINE: NEGATIVE MG/DL
BLOOD, URINE: NEGATIVE
BUN SERPL-MCNC: 24 MG/DL (ref 6–23)
CALCIUM SERPL-MCNC: 8.8 MG/DL (ref 8.3–10.6)
CAST TYPE: NORMAL /HPF
CHLORIDE BLD-SCNC: 105 MMOL/L (ref 99–110)
CLARITY: CLEAR
CO2: 21 MMOL/L (ref 21–32)
COLOR: YELLOW
CREAT SERPL-MCNC: 1.2 MG/DL (ref 0.6–1.1)
CRYSTAL TYPE: NEGATIVE /HPF
EKG ATRIAL RATE: 166 BPM
EKG DIAGNOSIS: NORMAL
EKG Q-T INTERVAL: 292 MS
EKG QRS DURATION: 106 MS
EKG QTC CALCULATION (BAZETT): 477 MS
EKG R AXIS: -55 DEGREES
EKG T AXIS: 132 DEGREES
EKG VENTRICULAR RATE: 161 BPM
EPITHELIAL CELLS, UA: NEGATIVE /HPF
GFR SERPL CREATININE-BSD FRML MDRD: 42 ML/MIN/1.73M2
GLUCOSE SERPL-MCNC: 112 MG/DL (ref 70–99)
GLUCOSE, URINE: NEGATIVE MG/DL
HCT VFR BLD CALC: 44.4 % (ref 37–47)
HEMOGLOBIN: 14.2 GM/DL (ref 12.5–16)
INFLUENZA A ANTIGEN: DETECTED
INFLUENZA B ANTIGEN: NOT DETECTED
KETONES, URINE: NEGATIVE MG/DL
LEUKOCYTE ESTERASE, URINE: ABNORMAL
MAGNESIUM: 2.1 MG/DL (ref 1.8–2.4)
MCH RBC QN AUTO: 30.1 PG (ref 27–31)
MCHC RBC AUTO-ENTMCNC: 32 % (ref 32–36)
MCV RBC AUTO: 94.3 FL (ref 78–100)
NITRITE URINE, QUANTITATIVE: NEGATIVE
PDW BLD-RTO: 14.5 % (ref 11.7–14.9)
PH, URINE: 6 (ref 5–8)
PLATELET # BLD: 193 K/CU MM (ref 140–440)
PMV BLD AUTO: 10.1 FL (ref 7.5–11.1)
POTASSIUM SERPL-SCNC: 5.1 MMOL/L (ref 3.5–5.1)
PRO-BNP: 9763 PG/ML
PROTEIN UA: NEGATIVE MG/DL
RBC # BLD: 4.71 M/CU MM (ref 4.2–5.4)
RBC URINE: NEGATIVE /HPF (ref 0–6)
SARS-COV-2 RDRP RESP QL NAA+PROBE: NOT DETECTED
SODIUM BLD-SCNC: 143 MMOL/L (ref 135–145)
SOURCE: NORMAL
SPECIFIC GRAVITY UA: 1.02 (ref 1–1.03)
TOTAL PROTEIN: 7.1 GM/DL (ref 6.4–8.2)
TROPONIN, HIGH SENSITIVITY: 15 NG/L (ref 0–14)
TROPONIN, HIGH SENSITIVITY: 17 NG/L (ref 0–14)
TSH SERPL DL<=0.005 MIU/L-ACNC: 2.6 UIU/ML (ref 0.27–4.2)
UROBILINOGEN, URINE: 0.2 MG/DL (ref 0.2–1)
WBC # BLD: 7.5 K/CU MM (ref 4–10.5)
WBC UA: 2 /HPF (ref 0–5)

## 2024-02-10 PROCEDURE — 6370000000 HC RX 637 (ALT 250 FOR IP): Performed by: EMERGENCY MEDICINE

## 2024-02-10 PROCEDURE — 93010 ELECTROCARDIOGRAM REPORT: CPT | Performed by: INTERNAL MEDICINE

## 2024-02-10 PROCEDURE — 96375 TX/PRO/DX INJ NEW DRUG ADDON: CPT

## 2024-02-10 PROCEDURE — 2500000003 HC RX 250 WO HCPCS: Performed by: EMERGENCY MEDICINE

## 2024-02-10 PROCEDURE — 94664 DEMO&/EVAL PT USE INHALER: CPT

## 2024-02-10 PROCEDURE — 71045 X-RAY EXAM CHEST 1 VIEW: CPT

## 2024-02-10 PROCEDURE — 87502 INFLUENZA DNA AMP PROBE: CPT

## 2024-02-10 PROCEDURE — 2000000000 HC ICU R&B

## 2024-02-10 PROCEDURE — 6370000000 HC RX 637 (ALT 250 FOR IP): Performed by: PHYSICIAN ASSISTANT

## 2024-02-10 PROCEDURE — 83735 ASSAY OF MAGNESIUM: CPT

## 2024-02-10 PROCEDURE — 87635 SARS-COV-2 COVID-19 AMP PRB: CPT

## 2024-02-10 PROCEDURE — 83880 ASSAY OF NATRIURETIC PEPTIDE: CPT

## 2024-02-10 PROCEDURE — 84484 ASSAY OF TROPONIN QUANT: CPT

## 2024-02-10 PROCEDURE — 81001 URINALYSIS AUTO W/SCOPE: CPT

## 2024-02-10 PROCEDURE — 84145 PROCALCITONIN (PCT): CPT

## 2024-02-10 PROCEDURE — 6360000002 HC RX W HCPCS: Performed by: EMERGENCY MEDICINE

## 2024-02-10 PROCEDURE — 94640 AIRWAY INHALATION TREATMENT: CPT

## 2024-02-10 PROCEDURE — 99285 EMERGENCY DEPT VISIT HI MDM: CPT

## 2024-02-10 PROCEDURE — 93005 ELECTROCARDIOGRAM TRACING: CPT | Performed by: EMERGENCY MEDICINE

## 2024-02-10 PROCEDURE — 80053 COMPREHEN METABOLIC PANEL: CPT

## 2024-02-10 PROCEDURE — 96365 THER/PROPH/DIAG IV INF INIT: CPT

## 2024-02-10 PROCEDURE — 84443 ASSAY THYROID STIM HORMONE: CPT

## 2024-02-10 PROCEDURE — 85027 COMPLETE CBC AUTOMATED: CPT

## 2024-02-10 RX ORDER — IPRATROPIUM BROMIDE AND ALBUTEROL SULFATE 2.5; .5 MG/3ML; MG/3ML
1 SOLUTION RESPIRATORY (INHALATION) ONCE
Status: COMPLETED | OUTPATIENT
Start: 2024-02-10 | End: 2024-02-10

## 2024-02-10 RX ORDER — LEVOTHYROXINE SODIUM 0.07 MG/1
75 TABLET ORAL DAILY
Status: DISCONTINUED | OUTPATIENT
Start: 2024-02-11 | End: 2024-02-13 | Stop reason: HOSPADM

## 2024-02-10 RX ORDER — IPRATROPIUM BROMIDE AND ALBUTEROL SULFATE 2.5; .5 MG/3ML; MG/3ML
1 SOLUTION RESPIRATORY (INHALATION)
Status: DISCONTINUED | OUTPATIENT
Start: 2024-02-10 | End: 2024-02-13 | Stop reason: HOSPADM

## 2024-02-10 RX ORDER — HYDROXYZINE HYDROCHLORIDE 10 MG/1
10 TABLET, FILM COATED ORAL 3 TIMES DAILY
Status: DISCONTINUED | OUTPATIENT
Start: 2024-02-10 | End: 2024-02-13 | Stop reason: HOSPADM

## 2024-02-10 RX ORDER — SODIUM CHLORIDE 9 MG/ML
INJECTION, SOLUTION INTRAVENOUS PRN
Status: DISCONTINUED | OUTPATIENT
Start: 2024-02-10 | End: 2024-02-13 | Stop reason: HOSPADM

## 2024-02-10 RX ORDER — ONDANSETRON 2 MG/ML
4 INJECTION INTRAMUSCULAR; INTRAVENOUS EVERY 6 HOURS PRN
Status: DISCONTINUED | OUTPATIENT
Start: 2024-02-10 | End: 2024-02-13 | Stop reason: HOSPADM

## 2024-02-10 RX ORDER — OSELTAMIVIR PHOSPHATE 30 MG/1
30 CAPSULE ORAL DAILY
Status: DISCONTINUED | OUTPATIENT
Start: 2024-02-10 | End: 2024-02-13 | Stop reason: HOSPADM

## 2024-02-10 RX ORDER — SODIUM CHLORIDE 0.9 % (FLUSH) 0.9 %
5-40 SYRINGE (ML) INJECTION PRN
Status: DISCONTINUED | OUTPATIENT
Start: 2024-02-10 | End: 2024-02-13 | Stop reason: HOSPADM

## 2024-02-10 RX ORDER — ONDANSETRON 4 MG/1
4 TABLET, ORALLY DISINTEGRATING ORAL EVERY 8 HOURS PRN
Status: DISCONTINUED | OUTPATIENT
Start: 2024-02-10 | End: 2024-02-13 | Stop reason: HOSPADM

## 2024-02-10 RX ORDER — DIVALPROEX SODIUM 125 MG/1
125 CAPSULE, COATED PELLETS ORAL 2 TIMES DAILY PRN
Status: DISCONTINUED | OUTPATIENT
Start: 2024-02-10 | End: 2024-02-13 | Stop reason: HOSPADM

## 2024-02-10 RX ORDER — DILTIAZEM HYDROCHLORIDE 5 MG/ML
10 INJECTION INTRAVENOUS ONCE
Status: COMPLETED | OUTPATIENT
Start: 2024-02-10 | End: 2024-02-10

## 2024-02-10 RX ORDER — HYDROXYZINE HCL 10 MG/5 ML
10 SOLUTION, ORAL ORAL 3 TIMES DAILY
Status: DISCONTINUED | OUTPATIENT
Start: 2024-02-10 | End: 2024-02-10 | Stop reason: CLARIF

## 2024-02-10 RX ORDER — DILTIAZEM HYDROCHLORIDE 240 MG/1
240 CAPSULE, COATED, EXTENDED RELEASE ORAL DAILY
Status: DISCONTINUED | OUTPATIENT
Start: 2024-02-10 | End: 2024-02-13 | Stop reason: HOSPADM

## 2024-02-10 RX ORDER — PANTOPRAZOLE SODIUM 40 MG/1
40 TABLET, DELAYED RELEASE ORAL
Status: DISCONTINUED | OUTPATIENT
Start: 2024-02-11 | End: 2024-02-13 | Stop reason: HOSPADM

## 2024-02-10 RX ORDER — FUROSEMIDE 10 MG/ML
40 INJECTION INTRAMUSCULAR; INTRAVENOUS ONCE
Status: COMPLETED | OUTPATIENT
Start: 2024-02-10 | End: 2024-02-10

## 2024-02-10 RX ORDER — ACETAMINOPHEN 325 MG/1
650 TABLET ORAL EVERY 6 HOURS PRN
Status: DISCONTINUED | OUTPATIENT
Start: 2024-02-10 | End: 2024-02-13 | Stop reason: HOSPADM

## 2024-02-10 RX ORDER — ACETAMINOPHEN 650 MG/1
650 SUPPOSITORY RECTAL EVERY 6 HOURS PRN
Status: DISCONTINUED | OUTPATIENT
Start: 2024-02-10 | End: 2024-02-13 | Stop reason: HOSPADM

## 2024-02-10 RX ORDER — LANOLIN ALCOHOL/MO/W.PET/CERES
3 CREAM (GRAM) TOPICAL NIGHTLY
Status: DISCONTINUED | OUTPATIENT
Start: 2024-02-10 | End: 2024-02-13 | Stop reason: HOSPADM

## 2024-02-10 RX ORDER — SODIUM CHLORIDE 0.9 % (FLUSH) 0.9 %
5-40 SYRINGE (ML) INJECTION EVERY 12 HOURS SCHEDULED
Status: DISCONTINUED | OUTPATIENT
Start: 2024-02-10 | End: 2024-02-13 | Stop reason: HOSPADM

## 2024-02-10 RX ORDER — IPRATROPIUM BROMIDE AND ALBUTEROL SULFATE 2.5; .5 MG/3ML; MG/3ML
1 SOLUTION RESPIRATORY (INHALATION) ONCE
Status: DISCONTINUED | OUTPATIENT
Start: 2024-02-10 | End: 2024-02-10

## 2024-02-10 RX ORDER — POLYETHYLENE GLYCOL 3350 17 G/17G
17 POWDER, FOR SOLUTION ORAL DAILY PRN
Status: DISCONTINUED | OUTPATIENT
Start: 2024-02-10 | End: 2024-02-13 | Stop reason: HOSPADM

## 2024-02-10 RX ADMIN — IPRATROPIUM BROMIDE AND ALBUTEROL SULFATE 1 DOSE: .5; 3 SOLUTION RESPIRATORY (INHALATION) at 18:30

## 2024-02-10 RX ADMIN — HYDROXYZINE HYDROCHLORIDE 10 MG: 10 TABLET ORAL at 20:44

## 2024-02-10 RX ADMIN — DEXTROSE MONOHYDRATE 2.5 MG/HR: 50 INJECTION, SOLUTION INTRAVENOUS at 15:42

## 2024-02-10 RX ADMIN — OSELTAMIVIR PHOSPHATE 30 MG: 30 CAPSULE ORAL at 20:44

## 2024-02-10 RX ADMIN — DILTIAZEM HYDROCHLORIDE 240 MG: 240 CAPSULE, COATED, EXTENDED RELEASE ORAL at 20:46

## 2024-02-10 RX ADMIN — APIXABAN 2.5 MG: 2.5 TABLET, FILM COATED ORAL at 20:44

## 2024-02-10 RX ADMIN — METOPROLOL TARTRATE 12.5 MG: 25 TABLET, FILM COATED ORAL at 20:43

## 2024-02-10 RX ADMIN — Medication 3 MG: at 20:44

## 2024-02-10 RX ADMIN — DIVALPROEX SODIUM 125 MG: 125 CAPSULE ORAL at 20:43

## 2024-02-10 RX ADMIN — FUROSEMIDE 40 MG: 10 INJECTION, SOLUTION INTRAMUSCULAR; INTRAVENOUS at 15:32

## 2024-02-10 RX ADMIN — DILTIAZEM HYDROCHLORIDE 10 MG: 5 INJECTION, SOLUTION INTRAVENOUS at 15:37

## 2024-02-10 NOTE — H&P
V2.0  History and Physical      Name:  Breezy Hernández /Age/Sex: 3/26/1931  (89 y.o. female)   MRN & CSN:  [id number] & [id number] Encounter Date/Time: 2/10/2024 4:52 PM EST   Location:   PCP: Liz De La Cruz DO       Hospital Day: 1    Assessment and Plan:   Breezy Hernández is a 89 y.o. female with a past medical history of atrial fibrillation on Eliquis, thyroid disease who presents with dyspnea, cough, tachycardia.     Atrial fibrillation with RVR   - history of persistent Afib, [facility]  - HR 150s upon presentation to the ED, BP stable  - likely secondary to missed meds, acute CHF and influenza  - started on cardizem gtt with improvement, rate now 90-100s  - ED physician discussed with Dr. Cox - OK to stay in [place] if rate is controlled   - continue cardizem gtt, restart home PO cardizem and metoprolol, wean drip as able  - monitor on tele   - check TSH, mag    Acute on chronic systolic/diastolic CHF  - presented with dyspnea, LE edema  - echo 2023 with EF 45%, G2DD, global hypokinesis, moderate AR, mild-mod MR  - BNP 9K  - CXR with left basilar atelectasis vs. pneumonia   - no hypoxia  - received IV lasix 40mg in ED with 800cc UOP, continue to monitor response   -Strict intake/output, daily weights    Influenza A  - presented with dyspnea, cough, malaise, symptoms started   - CXR with left basilar atelectasis vs. PNA  - doubt bacterial process as no leukocytosis, afebrile and symptoms started in last 48 hours  - check procal   - started on Tamiflu in ED, continue   - scheduled Duonebs    CKDIII  -Cr 1.2, near baseline   - avoid nephrotoxins   - monitor renal function closely with diuresis     Mechanical falls  - niece reported multiple falls in the last several weeks, last being 1 week ago, all mechanical in nature  - no head or other acute injuries, has some bruising to left thigh   - fall precautions   - PT/OT    Hyopthyroidism   - continue home Synthroid  - check

## 2024-02-10 NOTE — PROGRESS NOTES
Pt arrived to room 6 from ED at 1820. Pt in bed, bed alarm on, family at bedside. Pt on cardizem gtt. VSS

## 2024-02-10 NOTE — ED PROVIDER NOTES
Disposition Discussion:  Patient is positive for influenza A which I believe may be causing some of her symptoms and she was started on Tamiflu for this.  She did not take her Cardizem this morning which she should have and her heart rate has improved with 1 bolus here.  Patient was given a dose of Lasix as she does appear slightly fluid overloaded.  My plan is to bring her in for hospitalization for her dyspnea which is likely multifactorial in nature.  Will discuss the case with HM team here to see if they are amenable to managing this year.  She does not have any significant cardiac compromise noted at this time and so I did discuss the case with Dr. Cox per their request and he was also okay as long as she is rate controlled.  He stated if she decompensates further they can always transfer at that time.  I relayed this information to  team who states they will come down to evaluate the patient.    Is this patient to be included in the SEP-1 core measure due to severe sepsis or septic shock? No Exclusion criteria - the patient is NOT to be included for SEP-1 Core Measure due to: Viral etiology found or highly suspected (including COVID-19) without concomitant bacterial infection     Disposition: Hospitalized     I am the primary physician of record    Clinical Impression:  1. Atrial fibrillation with RVR (HCC)    2. Acute on chronic congestive heart failure, unspecified heart failure type (HCC)    3. Influenza A      Disposition referral (if applicable):  No follow-up provider specified.  Disposition medications (if applicable):  New Prescriptions    No medications on file       Comment: Please note this report has been produced using speech recognition software and may contain errors related to that system including errors in grammar, punctuation, and spelling, as well as words and phrases that may be inappropriate. If there are any questions or concerns please feel free to contact the dictating

## 2024-02-10 NOTE — PROGRESS NOTES
4 Eyes Skin Assessment     NAME:  Breezy Hernández  YOB: 1931  MEDICAL RECORD NUMBER:  [id number]    The patient is being assessed for  Admission    I agree that at least one RN has performed a thorough Head to Toe Skin Assessment on the patient. ALL assessment sites listed below have been assessed.      Areas assessed by both nurses:    Head, Face, Ears, Shoulders, Back, Chest, Arms, Elbows, Hands, Sacrum. Buttock, Coccyx, Ischium, Legs. Feet and Heels, and Under Medical Devices         Does the Patient have a Wound? No noted wound(s)       [place] Prevention initiated by RN: Yes  Wound Care Orders initiated by RN: No    Pressure Injury (Stage 3,4, Unstageable, DTI, NWPT, and Complex wounds) if present, place Wound referral order by RN under : No    New Ostomies, if present place, Ostomy referral order under : No     Nurse 1 eSignature: Electronically signed by Shira Irene RN on 2/10/24 at 6:53 PM EST    **SHARE this note so that the co-signing nurse can place an eSignature**    Nurse 2 eSignature: Electronically signed by Lissa Fuentes RN on 2/10/24 at 6:59 PM EST

## 2024-02-11 LAB
ANION GAP SERPL CALCULATED.3IONS-SCNC: 12 MMOL/L (ref 7–16)
BASOPHILS ABSOLUTE: 0 K/CU MM
BASOPHILS RELATIVE PERCENT: 0.5 % (ref 0–1)
BUN SERPL-MCNC: 26 MG/DL (ref 6–23)
CALCIUM SERPL-MCNC: 7.9 MG/DL (ref 8.3–10.6)
CHLORIDE BLD-SCNC: 104 MMOL/L (ref 99–110)
CO2: 26 MMOL/L (ref 21–32)
CREAT SERPL-MCNC: 1.2 MG/DL (ref 0.6–1.1)
DIFFERENTIAL TYPE: ABNORMAL
EOSINOPHILS ABSOLUTE: 0 K/CU MM
EOSINOPHILS RELATIVE PERCENT: 0.7 % (ref 0–3)
GFR SERPL CREATININE-BSD FRML MDRD: 42 ML/MIN/1.73M2
GLUCOSE SERPL-MCNC: 93 MG/DL (ref 70–99)
HCT VFR BLD CALC: 36.2 % (ref 37–47)
HEMOGLOBIN: 11.7 GM/DL (ref 12.5–16)
IMMATURE NEUTROPHIL %: 0.2 % (ref 0–0.43)
LYMPHOCYTES ABSOLUTE: 1.2 K/CU MM
LYMPHOCYTES RELATIVE PERCENT: 19.5 % (ref 24–44)
MAGNESIUM: 1.9 MG/DL (ref 1.8–2.4)
MCH RBC QN AUTO: 30.1 PG (ref 27–31)
MCHC RBC AUTO-ENTMCNC: 32.3 % (ref 32–36)
MCV RBC AUTO: 93.1 FL (ref 78–100)
MONOCYTES ABSOLUTE: 1.1 K/CU MM
PDW BLD-RTO: 14.4 % (ref 11.7–14.9)
PLATELET # BLD: 163 K/CU MM (ref 140–440)
PMV BLD AUTO: 10.4 FL (ref 7.5–11.1)
POTASSIUM SERPL-SCNC: 3.8 MMOL/L (ref 3.5–5.1)
PROCALCITONIN SERPL-MCNC: 0.1 NG/ML
RBC # BLD: 3.89 M/CU MM (ref 4.2–5.4)
SEGMENTED NEUTROPHILS ABSOLUTE COUNT: 3.8 K/CU MM
SEGMENTED NEUTROPHILS RELATIVE PERCENT: 61.3 % (ref 36–66)
SODIUM BLD-SCNC: 142 MMOL/L (ref 135–145)
TOTAL IMMATURE NEUTOROPHIL: 0.01 K/CU MM
WBC # BLD: 6.1 K/CU MM (ref 4–10.5)

## 2024-02-11 PROCEDURE — 2580000003 HC RX 258: Performed by: PHYSICIAN ASSISTANT

## 2024-02-11 PROCEDURE — 2700000000 HC OXYGEN THERAPY PER DAY

## 2024-02-11 PROCEDURE — 94640 AIRWAY INHALATION TREATMENT: CPT

## 2024-02-11 PROCEDURE — 83735 ASSAY OF MAGNESIUM: CPT

## 2024-02-11 PROCEDURE — 6370000000 HC RX 637 (ALT 250 FOR IP): Performed by: PHYSICIAN ASSISTANT

## 2024-02-11 PROCEDURE — 2500000003 HC RX 250 WO HCPCS: Performed by: NURSE PRACTITIONER

## 2024-02-11 PROCEDURE — 80048 BASIC METABOLIC PNL TOTAL CA: CPT

## 2024-02-11 PROCEDURE — 85025 COMPLETE CBC W/AUTO DIFF WBC: CPT

## 2024-02-11 PROCEDURE — 2000000000 HC ICU R&B

## 2024-02-11 RX ADMIN — IPRATROPIUM BROMIDE AND ALBUTEROL SULFATE 1 DOSE: 2.5; .5 SOLUTION RESPIRATORY (INHALATION) at 07:26

## 2024-02-11 RX ADMIN — APIXABAN 2.5 MG: 2.5 TABLET, FILM COATED ORAL at 21:03

## 2024-02-11 RX ADMIN — METOPROLOL TARTRATE 12.5 MG: 25 TABLET, FILM COATED ORAL at 21:04

## 2024-02-11 RX ADMIN — IPRATROPIUM BROMIDE AND ALBUTEROL SULFATE 1 DOSE: 2.5; .5 SOLUTION RESPIRATORY (INHALATION) at 20:15

## 2024-02-11 RX ADMIN — DEXTROSE MONOHYDRATE 5 MG/HR: 50 INJECTION, SOLUTION INTRAVENOUS at 17:32

## 2024-02-11 RX ADMIN — IPRATROPIUM BROMIDE AND ALBUTEROL SULFATE 1 DOSE: 2.5; .5 SOLUTION RESPIRATORY (INHALATION) at 15:20

## 2024-02-11 RX ADMIN — HYDROXYZINE HYDROCHLORIDE 10 MG: 10 TABLET ORAL at 21:03

## 2024-02-11 RX ADMIN — Medication 3 MG: at 21:04

## 2024-02-11 RX ADMIN — IPRATROPIUM BROMIDE AND ALBUTEROL SULFATE 1 DOSE: 2.5; .5 SOLUTION RESPIRATORY (INHALATION) at 10:55

## 2024-02-11 RX ADMIN — DILTIAZEM HYDROCHLORIDE 240 MG: 240 CAPSULE, COATED, EXTENDED RELEASE ORAL at 13:04

## 2024-02-11 RX ADMIN — APIXABAN 2.5 MG: 2.5 TABLET, FILM COATED ORAL at 10:24

## 2024-02-11 RX ADMIN — HYDROXYZINE HYDROCHLORIDE 10 MG: 10 TABLET ORAL at 13:04

## 2024-02-11 RX ADMIN — LEVOTHYROXINE SODIUM 75 MCG: 0.07 TABLET ORAL at 05:35

## 2024-02-11 RX ADMIN — HYDROXYZINE HYDROCHLORIDE 10 MG: 10 TABLET ORAL at 10:24

## 2024-02-11 RX ADMIN — OSELTAMIVIR PHOSPHATE 30 MG: 30 CAPSULE ORAL at 17:32

## 2024-02-11 RX ADMIN — SODIUM CHLORIDE, PRESERVATIVE FREE 10 ML: 5 INJECTION INTRAVENOUS at 10:25

## 2024-02-11 RX ADMIN — PANTOPRAZOLE SODIUM 40 MG: 40 TABLET, DELAYED RELEASE ORAL at 05:35

## 2024-02-11 NOTE — PLAN OF CARE
Problem: Skin/Tissue Integrity  Goal: Absence of new skin breakdown  Description: 1.  Monitor for areas of redness and/or skin breakdown  2.  Assess vascular access sites hourly  3.  Every 4-6 hours minimum:  Change oxygen saturation probe site  4.  Every 4-6 hours:  If on nasal continuous positive airway pressure, respiratory therapy assess nares and determine need for appliance change or resting period.  Outcome: Progressing     Problem: Safety - Adult  Goal: Free from fall injury  Outcome: Progressing     Problem: ABCDS Injury Assessment  Goal: Absence of physical injury  Outcome: Progressing

## 2024-02-11 NOTE — PROGRESS NOTES
V2.0  [facility] [place]      Name:  Breezy Hernández /Age/Sex: 3/26/1931  (89 y.o. female)   MRN & CSN:  [id number] & [id number] Encounter Date/Time: 2024 9:46 AM EST    Location:   PCP: Liz De La Cruz DO       Hospital Day: 2    Assessment and Plan:   Breezy Hernández is a 89 y.o. female with pmh of atrial fibrillation on Eliquis, hypothyroidism who presents with Atrial fibrillation with RVR (HCC)    Plan:  Atrial fibrillation with RVR   - history of persistent Afib, follows [facility]  - HR 150s upon presentation to the ED, BP stable  - likely secondary to missed meds, acute CHF and influenza  -SP Cardizem drip  -Continue oral Cardizem 240 Mg daily  -Current heart rate is in 70s     Acute on chronic systolic/diastolic CHF  - presented with dyspnea, LE edema  - echo 2023 with EF 45%, G2DD, global hypokinesis, moderate AR, mild-mod MR  - BNP 9K  - CXR with left basilar atelectasis vs. pneumonia   -No hypoxia  -No signs of fluid overload this morning, hold diuretics due to soft blood pressure    Influenza A  - presented with dyspnea, cough, malaise, symptoms started   - CXR with left basilar atelectasis vs. PNA  - started on Tamiflu in ED, continue   -Procalcitonin is negative  - scheduled Duonebs     CKDIII  -Cr 1.2, near baseline   - avoid nephrotoxins   - monitor renal function closely with diuresis      Mechanical falls  - niece reported multiple falls in the last several weeks, last being 1 week ago, all mechanical in nature  - no head or other acute injuries, has some bruising to left thigh   - fall precautions   - PT/OT     Hyopthyroidism   - continue home Synthroid  -TSH 2.6 normal     Dementia   - patient is alert and oriented x1-2 at baseline   - does sundown per niece  - schedule melatonin, maintain sleep/wake cycle, delirium precautions   - depakote sprinkle PRN for agitation     Anxiety   - continue home Atarax       Diet ADULT DIET; Regular; No Added Salt

## 2024-02-11 NOTE — PLAN OF CARE
Problem: Skin/Tissue Integrity  Goal: Absence of new skin breakdown  Description: 1.  Monitor for areas of redness and/or skin breakdown  2.  Assess vascular access sites hourly  3.  Every 4-6 hours minimum:  Change oxygen saturation probe site  4.  Every 4-6 hours:  If on nasal continuous positive airway pressure, respiratory therapy assess nares and determine need for appliance change or resting period.  2/11/2024 1045 by Shira Irene RN  Outcome: Progressing  2/10/2024 2219 by Loli Gomez RN  Outcome: Progressing     Problem: Safety - Adult  Goal: Free from fall injury  2/11/2024 1045 by Shira Irene RN  Outcome: Progressing  2/10/2024 2219 by Loli Gomez RN  Outcome: Progressing     Problem: ABCDS Injury Assessment  Goal: Absence of physical injury  2/11/2024 1045 by Shira Irene RN  Outcome: Progressing  2/10/2024 2219 by Loli Gomez RN  Outcome: Progressing

## 2024-02-12 LAB
ANION GAP SERPL CALCULATED.3IONS-SCNC: 12 MMOL/L (ref 7–16)
BASOPHILS ABSOLUTE: 0 K/CU MM
BASOPHILS RELATIVE PERCENT: 0.3 % (ref 0–1)
BUN SERPL-MCNC: 29 MG/DL (ref 6–23)
CALCIUM SERPL-MCNC: 7.8 MG/DL (ref 8.3–10.6)
CHLORIDE BLD-SCNC: 107 MMOL/L (ref 99–110)
CO2: 23 MMOL/L (ref 21–32)
CREAT SERPL-MCNC: 1.4 MG/DL (ref 0.6–1.1)
DIFFERENTIAL TYPE: ABNORMAL
EOSINOPHILS ABSOLUTE: 0.2 K/CU MM
EOSINOPHILS RELATIVE PERCENT: 3.2 % (ref 0–3)
GFR SERPL CREATININE-BSD FRML MDRD: 35 ML/MIN/1.73M2
GLUCOSE SERPL-MCNC: 98 MG/DL (ref 70–99)
HCT VFR BLD CALC: 36.4 % (ref 37–47)
HEMOGLOBIN: 11.6 GM/DL (ref 12.5–16)
IMMATURE NEUTROPHIL %: 0.3 % (ref 0–0.43)
LYMPHOCYTES ABSOLUTE: 1.7 K/CU MM
LYMPHOCYTES RELATIVE PERCENT: 27.9 % (ref 24–44)
MAGNESIUM: 2 MG/DL (ref 1.8–2.4)
MCH RBC QN AUTO: 30.1 PG (ref 27–31)
MCHC RBC AUTO-ENTMCNC: 31.9 % (ref 32–36)
MCV RBC AUTO: 94.3 FL (ref 78–100)
MONOCYTES ABSOLUTE: 0.9 K/CU MM
MONOCYTES RELATIVE PERCENT: 14.9 % (ref 0–4)
PDW BLD-RTO: 14.6 % (ref 11.7–14.9)
PLATELET # BLD: 154 K/CU MM (ref 140–440)
PMV BLD AUTO: 10.2 FL (ref 7.5–11.1)
POTASSIUM SERPL-SCNC: 3.7 MMOL/L (ref 3.5–5.1)
RBC # BLD: 3.86 M/CU MM (ref 4.2–5.4)
SEGMENTED NEUTROPHILS ABSOLUTE COUNT: 3.2 K/CU MM
SEGMENTED NEUTROPHILS RELATIVE PERCENT: 53.4 % (ref 36–66)
SODIUM BLD-SCNC: 142 MMOL/L (ref 135–145)
TOTAL IMMATURE NEUTOROPHIL: 0.02 K/CU MM
WBC # BLD: 6 K/CU MM (ref 4–10.5)

## 2024-02-12 PROCEDURE — 97530 THERAPEUTIC ACTIVITIES: CPT

## 2024-02-12 PROCEDURE — 80048 BASIC METABOLIC PNL TOTAL CA: CPT

## 2024-02-12 PROCEDURE — 2580000003 HC RX 258: Performed by: PHYSICIAN ASSISTANT

## 2024-02-12 PROCEDURE — 97166 OT EVAL MOD COMPLEX 45 MIN: CPT

## 2024-02-12 PROCEDURE — 6370000000 HC RX 637 (ALT 250 FOR IP): Performed by: PHYSICIAN ASSISTANT

## 2024-02-12 PROCEDURE — 85025 COMPLETE CBC W/AUTO DIFF WBC: CPT

## 2024-02-12 PROCEDURE — 83735 ASSAY OF MAGNESIUM: CPT

## 2024-02-12 PROCEDURE — 36415 COLL VENOUS BLD VENIPUNCTURE: CPT

## 2024-02-12 PROCEDURE — 94640 AIRWAY INHALATION TREATMENT: CPT

## 2024-02-12 PROCEDURE — 97116 GAIT TRAINING THERAPY: CPT

## 2024-02-12 PROCEDURE — 2000000000 HC ICU R&B

## 2024-02-12 PROCEDURE — 97162 PT EVAL MOD COMPLEX 30 MIN: CPT

## 2024-02-12 RX ORDER — FUROSEMIDE 20 MG/1
20 TABLET ORAL EVERY OTHER DAY
Qty: 45 TABLET | Refills: 0 | Status: SHIPPED | OUTPATIENT
Start: 2024-02-12 | End: 2024-05-12

## 2024-02-12 RX ADMIN — IPRATROPIUM BROMIDE AND ALBUTEROL SULFATE 1 DOSE: 2.5; .5 SOLUTION RESPIRATORY (INHALATION) at 19:44

## 2024-02-12 RX ADMIN — IPRATROPIUM BROMIDE AND ALBUTEROL SULFATE 1 DOSE: 2.5; .5 SOLUTION RESPIRATORY (INHALATION) at 07:28

## 2024-02-12 RX ADMIN — Medication 3 MG: at 20:45

## 2024-02-12 RX ADMIN — PANTOPRAZOLE SODIUM 40 MG: 40 TABLET, DELAYED RELEASE ORAL at 05:44

## 2024-02-12 RX ADMIN — HYDROXYZINE HYDROCHLORIDE 10 MG: 10 TABLET ORAL at 14:16

## 2024-02-12 RX ADMIN — SODIUM CHLORIDE, PRESERVATIVE FREE 10 ML: 5 INJECTION INTRAVENOUS at 10:16

## 2024-02-12 RX ADMIN — METOPROLOL TARTRATE 12.5 MG: 25 TABLET, FILM COATED ORAL at 20:45

## 2024-02-12 RX ADMIN — SODIUM CHLORIDE, PRESERVATIVE FREE 10 ML: 5 INJECTION INTRAVENOUS at 20:46

## 2024-02-12 RX ADMIN — HYDROXYZINE HYDROCHLORIDE 10 MG: 10 TABLET ORAL at 20:45

## 2024-02-12 RX ADMIN — APIXABAN 2.5 MG: 2.5 TABLET, FILM COATED ORAL at 20:45

## 2024-02-12 RX ADMIN — DILTIAZEM HYDROCHLORIDE 240 MG: 240 CAPSULE, COATED, EXTENDED RELEASE ORAL at 12:15

## 2024-02-12 RX ADMIN — OSELTAMIVIR PHOSPHATE 30 MG: 30 CAPSULE ORAL at 17:23

## 2024-02-12 RX ADMIN — LEVOTHYROXINE SODIUM 75 MCG: 0.07 TABLET ORAL at 05:44

## 2024-02-12 RX ADMIN — METOPROLOL TARTRATE 12.5 MG: 25 TABLET, FILM COATED ORAL at 12:15

## 2024-02-12 RX ADMIN — APIXABAN 2.5 MG: 2.5 TABLET, FILM COATED ORAL at 10:15

## 2024-02-12 RX ADMIN — IPRATROPIUM BROMIDE AND ALBUTEROL SULFATE 1 DOSE: 2.5; .5 SOLUTION RESPIRATORY (INHALATION) at 15:01

## 2024-02-12 RX ADMIN — HYDROXYZINE HYDROCHLORIDE 10 MG: 10 TABLET ORAL at 10:15

## 2024-02-12 RX ADMIN — IPRATROPIUM BROMIDE AND ALBUTEROL SULFATE 1 DOSE: 2.5; .5 SOLUTION RESPIRATORY (INHALATION) at 11:05

## 2024-02-12 NOTE — PROGRESS NOTES
Occupational Therapy  Facility/Department: [facility]  Occupational Therapy Initial Assessment    Name: Breezy Hernández  : 3/26/1931  MRN: [id number]  Date of Service: 2024    Discharge Recommendations:  Subacute/Skilled Nursing Facility  OT Equipment Recommendations  Other: Would benefit from continued therapy before taking neice taking her home with homehealth(has had in past)       Patient Diagnosis(es): The primary encounter diagnosis was Atrial fibrillation with RVR (HCC). Diagnoses of Acute on chronic congestive heart failure, unspecified heart failure type (HCC) and Influenza A were also pertinent to this visit.  Past Medical History:  has a past medical history of A-fib (HCC), Arthritis, and Thyroid disease.  Past Surgical History:  has a past surgical history that includes Tonsillectomy; Appendectomy; Cholecystectomy; Hysterectomy; eye surgery; and fracture surgery.    Treatment Diagnosis: A-fib with RVR      Assessment   Performance deficits / Impairments: Decreased functional mobility ;Decreased endurance;Decreased ADL status;Decreased ROM;Decreased strength;Decreased safe awareness;Decreased cognition;Decreased balance  Assessment: Pt is a 89 year old female with recent bout of flu on , was getting weaker and had two falls at home so niece had her come to ER, found to be in A-fib.  Treatment Diagnosis: A-fib with RVR  Prognosis: Fair  Decision Making: Medium Complexity  History: A-fib, CHF, CKD, Falls, niece reports signs of sundowners, anxiety, dementia  REQUIRES OT FOLLOW-UP: Yes  Activity Tolerance  Activity Tolerance: Patient limited by fatigue        Plan   Occupational Therapy Plan  Times Per Week: 2+  Current Treatment Recommendations: Strengthening, ROM, Balance training, Functional mobility training, Endurance training, Cognitive reorientation, Positioning, Equipment evaluation, education, & procurement, Patient/Caregiver education & training, Safety education & training,

## 2024-02-12 NOTE — PROGRESS NOTES
Facility/Department: [facility]  Physical Therapy Initial Assessment    Name Breezy Hernández    3/26/1931   MRN [id number]   Date of Service 2024   Patient Room  -     Patient Diagnoses The primary encounter diagnosis was Atrial fibrillation with RVR (HCC). Diagnoses of Acute on chronic congestive heart failure, unspecified heart failure type (HCC) and Influenza A were also pertinent to this visit.   Past Medical History  has a past medical history of A-fib (HCC), Arthritis, and Thyroid disease.   Past Surgical History  has a past surgical history that includes Tonsillectomy; Appendectomy; Cholecystectomy; Hysterectomy; eye surgery; and fracture surgery.       Discharge Recommendations  SNF, followed by 24 hour assist with [facility]  Equipment: None    Assessment  Conditions Requiring Skilled Therapeutic Intervention: Decreased functional mobility, Decreased strength, Decreased endurance, Decreased ADL status, Decreased high level ADLs/IADLs, Impaired safety awareness and Impaired cognition  Assessment of Evaluation: Patient is a 89 y.o. female who presents to [facility] with atrial fibrillation with RVR and was diagnosed with Influenza A. Patient would benefit from continued skilled physical therapy services to address decreased strength, endurance, impaired balance, and decline in functional mobility.  Treatment Diagnosis: Muscle weakness (generalized), Unsteadiness on feet, and History of falls, at risk for falls  Therapy Prognosis: Good  Decision Making: Medium Complexity  Requires PT Follow-Up: Yes  Activity Tolerance: Patient tolerated evaluation without incident and Patient limited by fatigue     Plan  General Plan: 3+  Treatment Initiation: Strengthening, Endurance training, Balance training, Functional mobility training, Transfer training, Gait training, Neuromuscular re-education, Therapeutic activity , ADL training, IADL training, and Group therapy, Stair training, Safety education & training,

## 2024-02-12 NOTE — PROGRESS NOTES
V2.0  [facility] [place] Hospitalist Progress Note      Name:  Breezy Hernández /Age/Sex: 3/26/1931  (89 y.o. female)   MRN & CSN:  [id number] & [id number] Encounter Date/Time: 2024 9:46 AM EST    Location:   PCP: Liz De La Cruz DO       Hospital Day: 3    Assessment and Plan:   Breezy Hernández is a 89 y.o. female with pmh of atrial fibrillation on Eliquis, hypothyroidism who presents with Atrial fibrillation with RVR (HCC)    Plan:  Atrial fibrillation with RVR   - history of persistent Afib, follows [facility]  - HR 150s upon presentation to the ED, BP stable  - likely secondary to missed meds, acute CHF and influenza  - Cardizem drip discontinued this a.m.  - Continue oral Cardizem and metoprolol     Acute on chronic systolic/diastolic CHF  - presented with dyspnea, LE edema  - echo 2023 with EF 45%, G2DD, global hypokinesis, moderate AR, mild-mod MR  - BNP 9K  - CXR with left basilar atelectasis vs. pneumonia   - No hypoxia  - No signs of fluid overload this morning, continue to hold p.o. diuretics    Influenza A  - presented with dyspnea, cough, malaise, symptoms started   - CXR with left basilar atelectasis vs. PNA  - Continue Tamiflu  - scheduled Duonebs     CKDIII  - sCr 1.4, near baseline   - avoid nephrotoxins       Mechanical falls  - niece reported multiple falls in the last several weeks, last being 1 week ago, all mechanical in nature  - no head or other acute injuries, has some bruising to left thigh   - fall precautions   - PT/OT consult pending     Hyopthyroidism   - continue home Synthroid  - TSH 2.6 normal     Dementia   - patient is alert and oriented x1-2 at baseline   - does sundown per niece  - schedule melatonin, maintain sleep/wake cycle, delirium precautions   - depakote sprinkle PRN for agitation     Anxiety   - continue home Atarax       Diet ADULT DIET; Regular; No Added Salt (3-4 gm)   DVT Prophylaxis [] Lovenox, []  Heparin, [] SCDs, [] Ambulation,  [x] Eliquis,

## 2024-02-12 NOTE — PROGRESS NOTES
Comprehensive Nutrition Assessment    Type and Reason for Visit:  Initial (Low BMI for age)    Nutrition Recommendations/Plan:   Continue to provide QUENTIN diet.   Initiate trial of standard high calorie, high protein oral supplement BID (chocolate per pt request)  Encourage intake at all meals and offer alternatives to optimize po intake  Please document all weights and po intakes  Will monitor weights, labs, po intakes and POC     Malnutrition Assessment:  Malnutrition Status:  Severe malnutrition (02/12/24 1455)    Context:  Acute Illness     Findings of the 6 clinical characteristics of malnutrition:  Energy Intake:  Mild decrease in energy intake (Comment)  Weight Loss:   (3.6% wt loss but niece was unsure of exact time frame of loss)     Body Fat Loss:  Moderate body fat loss Orbital, Triceps, Buccal region   Muscle Mass Loss:  Moderate muscle mass loss Temples (temporalis), Clavicles (pectoralis & deltoids), Hand (interosseous)  Fluid Accumulation:  Unable to assess     Strength:  Not Performed    Nutrition Assessment:    Pt admitted from home with niece for Influenza A, Afib with RVR, acute on chronic CHF. Hx includes CKD stage 3, hypothyroid, dementia, afib. Noted pt is very hard of hearing. Diet order QUENTIN with intakes of 26-50%, 25%, 51-75%. Pt reports \"Oh I am doing alright.\" Niece reports that typically at home she is able to eat well. Has tried Ensure in the past and niece reports she did drink it for a while but then just stopped-willing to trial oral supplement while here to help optimize po intake. Noted 3.6% wt loss-niece is uncertain of the timeframe of weight loss. At this time pt does meet criteria for severe malnutrition based on areas of physical assessment. Will monitor and follow at moderate nutrition risk to ensure adequate oral intakes.    Nutrition Related Findings:    BUN 29 H, Creat 1.4 H, GFR 35 L. Meds include Lasix, Tamiflu. Wound Type: None       Current Nutrition Intake &

## 2024-02-12 NOTE — PLAN OF CARE
Problem: Skin/Tissue Integrity  Goal: Absence of new skin breakdown  Description: 1.  Monitor for areas of redness and/or skin breakdown  2.  Assess vascular access sites hourly  3.  Every 4-6 hours minimum:  Change oxygen saturation probe site  4.  Every 4-6 hours:  If on nasal continuous positive airway pressure, respiratory therapy assess nares and determine need for appliance change or resting period.  2/11/2024 2147 by Zaida Galvez RN  Outcome: Progressing  2/11/2024 1045 by Shira Irene RN  Outcome: Progressing     Problem: Safety - Adult  Goal: Free from fall injury  2/11/2024 2147 by Zaida Galvez RN  Outcome: Progressing  2/11/2024 1045 by Shira Irene RN  Outcome: Progressing     Problem: ABCDS Injury Assessment  Goal: Absence of physical injury  2/11/2024 2147 by Zaida Galvez RN  Outcome: Progressing  2/11/2024 1045 by Shira Irene RN  Outcome: Progressing     Problem: Cardiovascular - Adult  Goal: Maintains optimal cardiac output and hemodynamic stability  Outcome: Progressing  Goal: Absence of cardiac dysrhythmias or at baseline  Outcome: Progressing     Problem: Skin/Tissue Integrity - Adult  Goal: Skin integrity remains intact  Outcome: Progressing  Flowsheets (Taken 2/11/2024 1043 by Shira Irene RN)  Skin Integrity Remains Intact: Monitor for areas of redness and/or skin breakdown     Problem: Musculoskeletal - Adult  Goal: Return mobility to safest level of function  Outcome: Progressing     Problem: Hematologic - Adult  Goal: Maintains hematologic stability  Outcome: Progressing

## 2024-02-13 VITALS
DIASTOLIC BLOOD PRESSURE: 66 MMHG | OXYGEN SATURATION: 95 % | BODY MASS INDEX: 21.61 KG/M2 | SYSTOLIC BLOOD PRESSURE: 133 MMHG | HEART RATE: 71 BPM | TEMPERATURE: 97.7 F | HEIGHT: 59 IN | RESPIRATION RATE: 23 BRPM | WEIGHT: 107.2 LBS

## 2024-02-13 LAB
ANION GAP SERPL CALCULATED.3IONS-SCNC: 10 MMOL/L (ref 7–16)
BASOPHILS ABSOLUTE: 0 K/CU MM
BASOPHILS RELATIVE PERCENT: 0.7 % (ref 0–1)
BUN SERPL-MCNC: 27 MG/DL (ref 6–23)
CALCIUM SERPL-MCNC: 7.7 MG/DL (ref 8.3–10.6)
CHLORIDE BLD-SCNC: 108 MMOL/L (ref 99–110)
CO2: 23 MMOL/L (ref 21–32)
CREAT SERPL-MCNC: 1.2 MG/DL (ref 0.6–1.1)
DIFFERENTIAL TYPE: ABNORMAL
EOSINOPHILS ABSOLUTE: 0.4 K/CU MM
EOSINOPHILS RELATIVE PERCENT: 6.7 % (ref 0–3)
GFR SERPL CREATININE-BSD FRML MDRD: 42 ML/MIN/1.73M2
GLUCOSE SERPL-MCNC: 112 MG/DL (ref 70–99)
HCT VFR BLD CALC: 36.1 % (ref 37–47)
HEMOGLOBIN: 11.4 GM/DL (ref 12.5–16)
IMMATURE NEUTROPHIL %: 0.3 % (ref 0–0.43)
LYMPHOCYTES ABSOLUTE: 2.3 K/CU MM
LYMPHOCYTES RELATIVE PERCENT: 38.7 % (ref 24–44)
MAGNESIUM: 1.9 MG/DL (ref 1.8–2.4)
MCH RBC QN AUTO: 30.4 PG (ref 27–31)
MCHC RBC AUTO-ENTMCNC: 31.6 % (ref 32–36)
MCV RBC AUTO: 96.3 FL (ref 78–100)
MONOCYTES ABSOLUTE: 0.8 K/CU MM
MONOCYTES RELATIVE PERCENT: 13.7 % (ref 0–4)
PDW BLD-RTO: 14.6 % (ref 11.7–14.9)
PLATELET # BLD: 147 K/CU MM (ref 140–440)
PMV BLD AUTO: 9.9 FL (ref 7.5–11.1)
POTASSIUM SERPL-SCNC: 3.7 MMOL/L (ref 3.5–5.1)
PRO-BNP: 983.7 PG/ML
RBC # BLD: 3.75 M/CU MM (ref 4.2–5.4)
SEGMENTED NEUTROPHILS ABSOLUTE COUNT: 2.4 K/CU MM
SEGMENTED NEUTROPHILS RELATIVE PERCENT: 39.9 % (ref 36–66)
SODIUM BLD-SCNC: 141 MMOL/L (ref 135–145)
TOTAL IMMATURE NEUTOROPHIL: 0.02 K/CU MM
WBC # BLD: 6 K/CU MM (ref 4–10.5)

## 2024-02-13 PROCEDURE — 94640 AIRWAY INHALATION TREATMENT: CPT

## 2024-02-13 PROCEDURE — 6370000000 HC RX 637 (ALT 250 FOR IP): Performed by: PHYSICIAN ASSISTANT

## 2024-02-13 PROCEDURE — 80048 BASIC METABOLIC PNL TOTAL CA: CPT

## 2024-02-13 PROCEDURE — 36415 COLL VENOUS BLD VENIPUNCTURE: CPT

## 2024-02-13 PROCEDURE — 2580000003 HC RX 258: Performed by: PHYSICIAN ASSISTANT

## 2024-02-13 PROCEDURE — 85025 COMPLETE CBC W/AUTO DIFF WBC: CPT

## 2024-02-13 PROCEDURE — 83735 ASSAY OF MAGNESIUM: CPT

## 2024-02-13 PROCEDURE — 83880 ASSAY OF NATRIURETIC PEPTIDE: CPT

## 2024-02-13 RX ORDER — OSELTAMIVIR PHOSPHATE 30 MG/1
30 CAPSULE ORAL DAILY
Qty: 2 CAPSULE | Refills: 0 | Status: SHIPPED | OUTPATIENT
Start: 2024-02-13 | End: 2024-02-15

## 2024-02-13 RX ADMIN — PANTOPRAZOLE SODIUM 40 MG: 40 TABLET, DELAYED RELEASE ORAL at 05:19

## 2024-02-13 RX ADMIN — HYDROXYZINE HYDROCHLORIDE 10 MG: 10 TABLET ORAL at 08:51

## 2024-02-13 RX ADMIN — LEVOTHYROXINE SODIUM 75 MCG: 0.07 TABLET ORAL at 05:19

## 2024-02-13 RX ADMIN — SODIUM CHLORIDE, PRESERVATIVE FREE 10 ML: 5 INJECTION INTRAVENOUS at 08:52

## 2024-02-13 RX ADMIN — IPRATROPIUM BROMIDE AND ALBUTEROL SULFATE 1 DOSE: 2.5; .5 SOLUTION RESPIRATORY (INHALATION) at 10:53

## 2024-02-13 RX ADMIN — METOPROLOL TARTRATE 12.5 MG: 25 TABLET, FILM COATED ORAL at 08:50

## 2024-02-13 RX ADMIN — DILTIAZEM HYDROCHLORIDE 240 MG: 240 CAPSULE, COATED, EXTENDED RELEASE ORAL at 08:51

## 2024-02-13 RX ADMIN — APIXABAN 2.5 MG: 2.5 TABLET, FILM COATED ORAL at 08:51

## 2024-02-13 RX ADMIN — IPRATROPIUM BROMIDE AND ALBUTEROL SULFATE 1 DOSE: 2.5; .5 SOLUTION RESPIRATORY (INHALATION) at 07:11

## 2024-02-13 NOTE — PLAN OF CARE
Problem: Skin/Tissue Integrity  Goal: Absence of new skin breakdown  Description: 1.  Monitor for areas of redness and/or skin breakdown  2.  Assess vascular access sites hourly  3.  Every 4-6 hours minimum:  Change oxygen saturation probe site  4.  Every 4-6 hours:  If on nasal continuous positive airway pressure, respiratory therapy assess nares and determine need for appliance change or resting period.  2/13/2024 0956 by Alcides Burroughs RN  Outcome: HH/HSPC Resolved Resume Next Certification Period  2/13/2024 0956 by Alcides Burroughs RN  Outcome: Progressing  2/12/2024 2151 by Zaida Galvez RN  Outcome: Progressing     Problem: Safety - Adult  Goal: Free from fall injury  2/13/2024 0956 by Alcides Burroughs RN  Outcome: Progressing  Flowsheets (Taken 2/13/2024 0956)  Free From Fall Injury: Instruct family/caregiver on patient safety  2/12/2024 2151 by Zaida Galvez RN  Outcome: Progressing     Problem: ABCDS Injury Assessment  Goal: Absence of physical injury  2/13/2024 0956 by Alcides Burroughs RN  Outcome: Progressing  Flowsheets (Taken 2/13/2024 0956)  Absence of Physical Injury: Implement safety measures based on patient assessment  2/12/2024 2151 by Zaida Galvez RN  Outcome: Progressing     Problem: Cardiovascular - Adult  Goal: Maintains optimal cardiac output and hemodynamic stability  2/13/2024 0956 by Alcides Burroughs RN  Outcome: Progressing  Flowsheets (Taken 2/13/2024 0956)  Maintains optimal cardiac output and hemodynamic stability:   Monitor blood pressure and heart rate   Monitor urine output and notify Licensed Independent Practitioner for values outside of normal range   Assess for signs of decreased cardiac output  2/12/2024 2151 by Zaida Galvez RN  Outcome: Progressing  Goal: Absence of cardiac dysrhythmias or at baseline  2/13/2024 0956 by Alcides Burroughs RN  Outcome: Progressing  Flowsheets (Taken 2/13/2024 0956)  Absence of cardiac dysrhythmias or at baseline:

## 2024-02-13 NOTE — PLAN OF CARE
Problem: Skin/Tissue Integrity  Goal: Absence of new skin breakdown  Description: 1.  Monitor for areas of redness and/or skin breakdown  2.  Assess vascular access sites hourly  3.  Every 4-6 hours minimum:  Change oxygen saturation probe site  4.  Every 4-6 hours:  If on nasal continuous positive airway pressure, respiratory therapy assess nares and determine need for appliance change or resting period.  Outcome: Progressing     Problem: Safety - Adult  Goal: Free from fall injury  Outcome: Progressing     Problem: ABCDS Injury Assessment  Goal: Absence of physical injury  Outcome: Progressing     Problem: Cardiovascular - Adult  Goal: Maintains optimal cardiac output and hemodynamic stability  Outcome: Progressing     Problem: Skin/Tissue Integrity - Adult  Goal: Skin integrity remains intact  Outcome: Progressing     Problem: Hematologic - Adult  Goal: Maintains hematologic stability  Outcome: Progressing

## 2024-02-13 NOTE — DISCHARGE SUMMARY
V2.0  Discharge Summary    Name:  Breezy Hernández /Age/Sex: 3/26/1931 (89 y.o. female)   Admit Date: 2/10/2024  Discharge Date: 24    MRN & CSN:  [id number] & [id number] Encounter Date and Time 24 11:17 AM EST    Attending:  Radha Vasquez MD Discharging Provider: ALEJANDRO FIGUEROA NP       Hospital Course:     Brief HPI:  History is mainly obtained from niece at bedside as patient is confused at baseline. Niece reports that starting yesterday patient began experiencing mild productive cough, malaise and loss of appetite. Today she noted the patient was more short of breath, a little more confused and that she had high heart rates up into the 150s. Stated that her oxygen was okay but breathing was more labored. She does have prior history of A-fib. Uses DuoNebs at home for shortness of breath although does not have a diagnosis of COPD or asthma. In the emergency department she was found to be in A-fib with RVR and received a Cardizem bolus and started on Cardizem drip with improvement in heart rates. Also received IV Lasix and has been diuresing well. Patient is already feeling better and better appearing per family members. Niece denies known fevers, chills, nausea, vomiting, diarrhea. Does state that she walks with a walker and did have a mechanical fall without head injury about a week ago. Upon my evaluation, patient is alert and oriented to self, place, not to time or situation.         Patient seen and examined this morning, she is doing well she has no complaints, she tells me she would like to go home.  Heart rate has been controlled on p.o. Cardizem, metoprolol.  She is also on Eliquis 2.5 mg twice daily.  She will be discharging home today in stable condition.  She will need outpatient follow-up with cardiology and nephrology.          Brief Problem Based Course:   Atrial for with RVR most likely secondary to missing medications.  Rate is now controlled continue Cardizem and metoprolol and

## 2024-02-13 NOTE — CARE COORDINATION
CM spoke with the patient's niece Reneal Delmi on the telephone regarding discharge planning.  CM advised Thor of the PT/OT recommendations for SNF placement.  Thor stated that the plan is for the patient to return home and she would like a referral made to [facility]  (therapy only, declined nursing) who the patient has used previously.  Thor was unable to identify any other needs at this time.  CM will follow.      9:42 AM  CM faxed the [facility] order and supporting documentation to [facility] to initiate the referral.  CM will follow.     12:16 PM  CM faxed the discharge instructions to [facility] and spoke with Julia to notify them of the patient's discharge.  CM will follow.

## 2024-02-15 RX ORDER — DILTIAZEM HYDROCHLORIDE 180 MG/1
CAPSULE, COATED, EXTENDED RELEASE ORAL
Qty: 90 CAPSULE | Refills: 3 | Status: SHIPPED | OUTPATIENT
Start: 2024-02-15

## 2024-03-22 RX ORDER — DILTIAZEM HYDROCHLORIDE 240 MG/1
240 CAPSULE, EXTENDED RELEASE ORAL DAILY
Qty: 90 CAPSULE | Refills: 3 | Status: SHIPPED | OUTPATIENT
Start: 2024-03-22

## 2024-04-12 RX ORDER — APIXABAN 2.5 MG/1
TABLET, FILM COATED ORAL
Qty: 60 TABLET | Refills: 3 | OUTPATIENT
Start: 2024-04-12

## 2024-04-29 ENCOUNTER — TELEPHONE (OUTPATIENT)
Dept: CARDIOLOGY CLINIC | Age: 89
End: 2024-04-29

## 2024-04-29 NOTE — TELEPHONE ENCOUNTER
Suad from [facility] would like a call back regarding [facility] BP and HR. running low.

## 2024-05-02 NOTE — TELEPHONE ENCOUNTER
Suad with [facility] called to give numbers that patients family wrote down.  4/30/24 before meds in am  /77 HR68, 1 hour after meds 120/78 HR54   4/30/24 before evening meds HR 82, 1 hour after meds HR 80.  5/1/24 BP before meds 117/74 hr 66, 1 hour after meds hr 58  5/1/24 before evening meds 112/74 hr 85, 1 hour after meds hr 72  5/2/24 before am meds hr78  5/2/24 1 hour after am meds hr 74   Suad call back number is [phone], this is all of the info she has at the moment.

## 2024-05-03 NOTE — TELEPHONE ENCOUNTER
Returned call to Suad left a message. The patients blood pressures look good. Unless patient is having issues with her heart rate and blood pressure we will continue things as they are and make sure she keeps her next appointment.

## 2024-05-04 ENCOUNTER — HOSPITAL ENCOUNTER (INPATIENT)
Age: 89
LOS: 5 days | Discharge: HOME | End: 2024-05-09
Attending: INTERNAL MEDICINE | Admitting: HOSPITALIST
Payer: MEDICARE

## 2024-05-04 ENCOUNTER — HOSPITAL ENCOUNTER (EMERGENCY)
Age: 89
Discharge: ANOTHER HOSPITAL | End: 2024-05-04
Attending: EMERGENCY MEDICINE
Payer: MEDICARE

## 2024-05-04 ENCOUNTER — ANESTHESIA EVENT (OUTPATIENT)
Dept: OPERATING ROOM | Age: 89
End: 2024-05-04
Payer: MEDICARE

## 2024-05-04 ENCOUNTER — APPOINTMENT (OUTPATIENT)
Dept: GENERAL RADIOLOGY | Age: 89
End: 2024-05-04
Payer: MEDICARE

## 2024-05-04 ENCOUNTER — APPOINTMENT (OUTPATIENT)
Dept: CT IMAGING | Age: 89
End: 2024-05-04
Attending: INTERNAL MEDICINE
Payer: MEDICARE

## 2024-05-04 VITALS
OXYGEN SATURATION: 91 % | DIASTOLIC BLOOD PRESSURE: 64 MMHG | WEIGHT: 105.82 LBS | RESPIRATION RATE: 13 BRPM | BODY MASS INDEX: 21.37 KG/M2 | TEMPERATURE: 97.3 F | HEART RATE: 102 BPM | SYSTOLIC BLOOD PRESSURE: 124 MMHG

## 2024-05-04 DIAGNOSIS — I48.11 LONGSTANDING PERSISTENT ATRIAL FIBRILLATION (HCC): ICD-10-CM

## 2024-05-04 DIAGNOSIS — S72.002A CLOSED FRACTURE OF LEFT HIP, INITIAL ENCOUNTER (HCC): Primary | ICD-10-CM

## 2024-05-04 PROBLEM — T14.8XXA FRACTURE: Status: ACTIVE | Noted: 2024-05-04

## 2024-05-04 LAB
ALBUMIN SERPL-MCNC: 3.9 GM/DL (ref 3.4–5)
ALP BLD-CCNC: 146 IU/L (ref 40–129)
ALT SERPL-CCNC: 26 U/L (ref 10–40)
ANION GAP SERPL CALCULATED.3IONS-SCNC: 13 MMOL/L (ref 7–16)
ANISOCYTOSIS: ABNORMAL
AST SERPL-CCNC: 27 IU/L (ref 15–37)
BILIRUB SERPL-MCNC: 0.3 MG/DL (ref 0–1)
BUN SERPL-MCNC: 26 MG/DL (ref 6–23)
CALCIUM SERPL-MCNC: 10.1 MG/DL (ref 8.3–10.6)
CHLORIDE BLD-SCNC: 108 MMOL/L (ref 99–110)
CO2: 23 MMOL/L (ref 21–32)
CREAT SERPL-MCNC: 1.3 MG/DL (ref 0.6–1.1)
DIFFERENTIAL TYPE: ABNORMAL
EKG ATRIAL RATE: 174 BPM
EKG DIAGNOSIS: NORMAL
EKG Q-T INTERVAL: 266 MS
EKG QRS DURATION: 96 MS
EKG QTC CALCULATION (BAZETT): 442 MS
EKG R AXIS: -57 DEGREES
EKG T AXIS: 144 DEGREES
EKG VENTRICULAR RATE: 166 BPM
EOSINOPHILS ABSOLUTE: 0.7 K/CU MM
EOSINOPHILS RELATIVE PERCENT: 6 % (ref 0–3)
GFR, ESTIMATED: 38 ML/MIN/1.73M2
GLUCOSE SERPL-MCNC: 127 MG/DL (ref 70–99)
HCT VFR BLD CALC: 44.9 % (ref 37–47)
HEMOGLOBIN: 14.6 GM/DL (ref 12.5–16)
INR BLD: 1.1 INDEX
LYMPHOCYTES ABSOLUTE: 5.2 K/CU MM
LYMPHOCYTES RELATIVE PERCENT: 44 % (ref 24–44)
MCH RBC QN AUTO: 30.4 PG (ref 27–31)
MCHC RBC AUTO-ENTMCNC: 32.5 % (ref 32–36)
MCV RBC AUTO: 93.3 FL (ref 78–100)
MONOCYTES ABSOLUTE: 0.7 K/CU MM
MONOCYTES RELATIVE PERCENT: 6 % (ref 0–4)
NEUTROPHILS ABSOLUTE: 4.8 K/CU MM
NEUTROPHILS RELATIVE PERCENT: 41 % (ref 36–66)
OVALOCYTES: ABNORMAL
PDW BLD-RTO: 14.6 % (ref 11.7–14.9)
PLATELET # BLD: 216 K/CU MM (ref 140–440)
PLT MORPHOLOGY: ADEQUATE
PMV BLD AUTO: 10.2 FL (ref 7.5–11.1)
POTASSIUM SERPL-SCNC: 4.5 MMOL/L (ref 3.5–5.1)
PROTHROMBIN TIME: 14.9 SECONDS (ref 11.7–14.5)
RBC # BLD: 4.81 M/CU MM (ref 4.2–5.4)
SODIUM BLD-SCNC: 144 MMOL/L (ref 135–145)
TOTAL PROTEIN: 7.1 GM/DL (ref 6.4–8.2)
UNDIFFERENTIATED CELL: 3 %
WBC # BLD: 11.8 K/CU MM (ref 4–10.5)

## 2024-05-04 PROCEDURE — 73700 CT LOWER EXTREMITY W/O DYE: CPT

## 2024-05-04 PROCEDURE — 2140000000 HC CCU INTERMEDIATE R&B

## 2024-05-04 PROCEDURE — 99223 1ST HOSP IP/OBS HIGH 75: CPT | Performed by: INTERNAL MEDICINE

## 2024-05-04 PROCEDURE — 96375 TX/PRO/DX INJ NEW DRUG ADDON: CPT

## 2024-05-04 PROCEDURE — 6370000000 HC RX 637 (ALT 250 FOR IP): Performed by: HOSPITALIST

## 2024-05-04 PROCEDURE — 96374 THER/PROPH/DIAG INJ IV PUSH: CPT

## 2024-05-04 PROCEDURE — 80053 COMPREHEN METABOLIC PANEL: CPT

## 2024-05-04 PROCEDURE — 2580000003 HC RX 258: Performed by: HOSPITALIST

## 2024-05-04 PROCEDURE — 6360000002 HC RX W HCPCS: Performed by: HOSPITALIST

## 2024-05-04 PROCEDURE — 99285 EMERGENCY DEPT VISIT HI MDM: CPT

## 2024-05-04 PROCEDURE — 2500000003 HC RX 250 WO HCPCS: Performed by: HOSPITALIST

## 2024-05-04 PROCEDURE — 85027 COMPLETE CBC AUTOMATED: CPT

## 2024-05-04 PROCEDURE — 2580000003 HC RX 258: Performed by: STUDENT IN AN ORGANIZED HEALTH CARE EDUCATION/TRAINING PROGRAM

## 2024-05-04 PROCEDURE — 99221 1ST HOSP IP/OBS SF/LOW 40: CPT | Performed by: STUDENT IN AN ORGANIZED HEALTH CARE EDUCATION/TRAINING PROGRAM

## 2024-05-04 PROCEDURE — 85007 BL SMEAR W/DIFF WBC COUNT: CPT

## 2024-05-04 PROCEDURE — 94761 N-INVAS EAR/PLS OXIMETRY MLT: CPT

## 2024-05-04 PROCEDURE — 85610 PROTHROMBIN TIME: CPT

## 2024-05-04 PROCEDURE — 73502 X-RAY EXAM HIP UNI 2-3 VIEWS: CPT

## 2024-05-04 PROCEDURE — 2500000003 HC RX 250 WO HCPCS: Performed by: EMERGENCY MEDICINE

## 2024-05-04 PROCEDURE — 6360000002 HC RX W HCPCS: Performed by: EMERGENCY MEDICINE

## 2024-05-04 RX ORDER — DILTIAZEM HYDROCHLORIDE 240 MG/1
240 CAPSULE, COATED, EXTENDED RELEASE ORAL DAILY
Status: DISCONTINUED | OUTPATIENT
Start: 2024-05-04 | End: 2024-05-09 | Stop reason: HOSPADM

## 2024-05-04 RX ORDER — SODIUM CHLORIDE 9 MG/ML
INJECTION, SOLUTION INTRAVENOUS PRN
Status: DISCONTINUED | OUTPATIENT
Start: 2024-05-04 | End: 2024-05-05 | Stop reason: HOSPADM

## 2024-05-04 RX ORDER — ACETAMINOPHEN 325 MG/1
650 TABLET ORAL EVERY 6 HOURS PRN
Status: DISCONTINUED | OUTPATIENT
Start: 2024-05-04 | End: 2024-05-09 | Stop reason: HOSPADM

## 2024-05-04 RX ORDER — SODIUM CHLORIDE 0.9 % (FLUSH) 0.9 %
5-40 SYRINGE (ML) INJECTION PRN
Status: DISCONTINUED | OUTPATIENT
Start: 2024-05-04 | End: 2024-05-09 | Stop reason: HOSPADM

## 2024-05-04 RX ORDER — POTASSIUM CHLORIDE 7.45 MG/ML
10 INJECTION INTRAVENOUS PRN
Status: DISCONTINUED | OUTPATIENT
Start: 2024-05-04 | End: 2024-05-09 | Stop reason: HOSPADM

## 2024-05-04 RX ORDER — SODIUM CHLORIDE 0.9 % (FLUSH) 0.9 %
5-40 SYRINGE (ML) INJECTION PRN
Status: DISCONTINUED | OUTPATIENT
Start: 2024-05-04 | End: 2024-05-05 | Stop reason: HOSPADM

## 2024-05-04 RX ORDER — ONDANSETRON 4 MG/1
4 TABLET, ORALLY DISINTEGRATING ORAL EVERY 8 HOURS PRN
Status: DISCONTINUED | OUTPATIENT
Start: 2024-05-04 | End: 2024-05-09 | Stop reason: HOSPADM

## 2024-05-04 RX ORDER — ONDANSETRON 2 MG/ML
4 INJECTION INTRAMUSCULAR; INTRAVENOUS EVERY 6 HOURS PRN
Status: DISCONTINUED | OUTPATIENT
Start: 2024-05-04 | End: 2024-05-09 | Stop reason: HOSPADM

## 2024-05-04 RX ORDER — DILTIAZEM HYDROCHLORIDE 5 MG/ML
10 INJECTION INTRAVENOUS ONCE
Status: COMPLETED | OUTPATIENT
Start: 2024-05-04 | End: 2024-05-04

## 2024-05-04 RX ORDER — PANTOPRAZOLE SODIUM 40 MG/1
40 TABLET, DELAYED RELEASE ORAL
Status: DISCONTINUED | OUTPATIENT
Start: 2024-05-05 | End: 2024-05-09 | Stop reason: HOSPADM

## 2024-05-04 RX ORDER — HYDROXYZINE HYDROCHLORIDE 10 MG/1
10 TABLET, FILM COATED ORAL 3 TIMES DAILY
Status: DISCONTINUED | OUTPATIENT
Start: 2024-05-04 | End: 2024-05-09 | Stop reason: HOSPADM

## 2024-05-04 RX ORDER — ACETAMINOPHEN 500 MG
1000 TABLET ORAL ONCE
Status: DISCONTINUED | OUTPATIENT
Start: 2024-05-04 | End: 2024-05-05 | Stop reason: HOSPADM

## 2024-05-04 RX ORDER — POTASSIUM CHLORIDE 20 MEQ/1
40 TABLET, EXTENDED RELEASE ORAL PRN
Status: DISCONTINUED | OUTPATIENT
Start: 2024-05-04 | End: 2024-05-09 | Stop reason: HOSPADM

## 2024-05-04 RX ORDER — SODIUM CHLORIDE, SODIUM LACTATE, POTASSIUM CHLORIDE, CALCIUM CHLORIDE 600; 310; 30; 20 MG/100ML; MG/100ML; MG/100ML; MG/100ML
INJECTION, SOLUTION INTRAVENOUS CONTINUOUS
Status: DISCONTINUED | OUTPATIENT
Start: 2024-05-04 | End: 2024-05-05 | Stop reason: HOSPADM

## 2024-05-04 RX ORDER — MAGNESIUM SULFATE IN WATER 40 MG/ML
2000 INJECTION, SOLUTION INTRAVENOUS PRN
Status: DISCONTINUED | OUTPATIENT
Start: 2024-05-04 | End: 2024-05-09 | Stop reason: HOSPADM

## 2024-05-04 RX ORDER — HYDROXYZINE HCL 10 MG/5 ML
10 SOLUTION, ORAL ORAL 3 TIMES DAILY
Status: DISCONTINUED | OUTPATIENT
Start: 2024-05-04 | End: 2024-05-04 | Stop reason: CLARIF

## 2024-05-04 RX ORDER — METOPROLOL TARTRATE 1 MG/ML
5 INJECTION, SOLUTION INTRAVENOUS ONCE
Status: DISCONTINUED | OUTPATIENT
Start: 2024-05-04 | End: 2024-05-09 | Stop reason: HOSPADM

## 2024-05-04 RX ORDER — ACETAMINOPHEN 650 MG/1
650 SUPPOSITORY RECTAL EVERY 6 HOURS PRN
Status: DISCONTINUED | OUTPATIENT
Start: 2024-05-04 | End: 2024-05-09 | Stop reason: HOSPADM

## 2024-05-04 RX ORDER — SODIUM CHLORIDE 0.9 % (FLUSH) 0.9 %
5-40 SYRINGE (ML) INJECTION EVERY 12 HOURS SCHEDULED
Status: DISCONTINUED | OUTPATIENT
Start: 2024-05-04 | End: 2024-05-05 | Stop reason: HOSPADM

## 2024-05-04 RX ORDER — MORPHINE SULFATE 4 MG/ML
4 INJECTION, SOLUTION INTRAMUSCULAR; INTRAVENOUS EVERY 30 MIN PRN
Status: DISCONTINUED | OUTPATIENT
Start: 2024-05-04 | End: 2024-05-04 | Stop reason: HOSPADM

## 2024-05-04 RX ORDER — POLYETHYLENE GLYCOL 3350 17 G/17G
17 POWDER, FOR SOLUTION ORAL DAILY PRN
Status: DISCONTINUED | OUTPATIENT
Start: 2024-05-04 | End: 2024-05-09 | Stop reason: HOSPADM

## 2024-05-04 RX ORDER — SODIUM CHLORIDE 0.9 % (FLUSH) 0.9 %
5-40 SYRINGE (ML) INJECTION EVERY 12 HOURS SCHEDULED
Status: DISCONTINUED | OUTPATIENT
Start: 2024-05-04 | End: 2024-05-09 | Stop reason: HOSPADM

## 2024-05-04 RX ORDER — ENOXAPARIN SODIUM 100 MG/ML
1 INJECTION SUBCUTANEOUS ONCE
Status: COMPLETED | OUTPATIENT
Start: 2024-05-04 | End: 2024-05-04

## 2024-05-04 RX ORDER — LEVOTHYROXINE SODIUM 0.07 MG/1
75 TABLET ORAL DAILY
Status: DISCONTINUED | OUTPATIENT
Start: 2024-05-04 | End: 2024-05-09 | Stop reason: HOSPADM

## 2024-05-04 RX ORDER — MORPHINE SULFATE 2 MG/ML
2 INJECTION, SOLUTION INTRAMUSCULAR; INTRAVENOUS EVERY 4 HOURS PRN
Status: DISCONTINUED | OUTPATIENT
Start: 2024-05-04 | End: 2024-05-09 | Stop reason: HOSPADM

## 2024-05-04 RX ORDER — SODIUM CHLORIDE 9 MG/ML
INJECTION, SOLUTION INTRAVENOUS PRN
Status: DISCONTINUED | OUTPATIENT
Start: 2024-05-04 | End: 2024-05-09 | Stop reason: HOSPADM

## 2024-05-04 RX ADMIN — MORPHINE SULFATE 2 MG: 2 INJECTION, SOLUTION INTRAMUSCULAR; INTRAVENOUS at 11:56

## 2024-05-04 RX ADMIN — SODIUM CHLORIDE, POTASSIUM CHLORIDE, SODIUM LACTATE AND CALCIUM CHLORIDE: 600; 310; 30; 20 INJECTION, SOLUTION INTRAVENOUS at 09:34

## 2024-05-04 RX ADMIN — SODIUM CHLORIDE 5 MG/HR: 900 INJECTION, SOLUTION INTRAVENOUS at 12:49

## 2024-05-04 RX ADMIN — DILTIAZEM HYDROCHLORIDE 10 MG: 5 INJECTION, SOLUTION INTRAVENOUS at 05:02

## 2024-05-04 RX ADMIN — MORPHINE SULFATE 2 MG: 2 INJECTION, SOLUTION INTRAMUSCULAR; INTRAVENOUS at 22:20

## 2024-05-04 RX ADMIN — DILTIAZEM HYDROCHLORIDE 240 MG: 240 CAPSULE, EXTENDED RELEASE ORAL at 11:56

## 2024-05-04 RX ADMIN — METOPROLOL TARTRATE 12.5 MG: 25 TABLET, FILM COATED ORAL at 11:56

## 2024-05-04 RX ADMIN — ENOXAPARIN SODIUM 50 MG: 100 INJECTION SUBCUTANEOUS at 13:24

## 2024-05-04 RX ADMIN — MORPHINE SULFATE 4 MG: 4 INJECTION, SOLUTION INTRAMUSCULAR; INTRAVENOUS at 03:52

## 2024-05-04 RX ADMIN — DILTIAZEM HYDROCHLORIDE 10 MG: 5 INJECTION, SOLUTION INTRAVENOUS at 13:11

## 2024-05-04 RX ADMIN — SODIUM CHLORIDE, PRESERVATIVE FREE 10 ML: 5 INJECTION INTRAVENOUS at 11:56

## 2024-05-04 RX ADMIN — LEVOTHYROXINE SODIUM 75 MCG: 0.07 TABLET ORAL at 11:56

## 2024-05-04 ASSESSMENT — PAIN SCALES - GENERAL
PAINLEVEL_OUTOF10: 2
PAINLEVEL_OUTOF10: 8
PAINLEVEL_OUTOF10: 8
PAINLEVEL_OUTOF10: 0

## 2024-05-04 ASSESSMENT — PAIN DESCRIPTION - LOCATION
LOCATION: HIP
LOCATION: BACK;LEG
LOCATION: HIP

## 2024-05-04 ASSESSMENT — PAIN DESCRIPTION - ORIENTATION
ORIENTATION: LEFT

## 2024-05-04 ASSESSMENT — PAIN DESCRIPTION - PAIN TYPE
TYPE: ACUTE PAIN

## 2024-05-04 ASSESSMENT — PAIN - FUNCTIONAL ASSESSMENT
PAIN_FUNCTIONAL_ASSESSMENT: INTOLERABLE, UNABLE TO DO ANY ACTIVE OR PASSIVE ACTIVITIES
PAIN_FUNCTIONAL_ASSESSMENT: ACTIVITIES ARE NOT PREVENTED
PAIN_FUNCTIONAL_ASSESSMENT: FACE, LEGS, ACTIVITY, CRY, AND CONSOLABILITY (FLACC)

## 2024-05-04 ASSESSMENT — PAIN DESCRIPTION - FREQUENCY
FREQUENCY: CONTINUOUS
FREQUENCY: CONTINUOUS

## 2024-05-04 ASSESSMENT — PAIN DESCRIPTION - ONSET
ONSET: SUDDEN

## 2024-05-04 ASSESSMENT — PAIN DESCRIPTION - DESCRIPTORS
DESCRIPTORS: OTHER (COMMENT)
DESCRIPTORS: ACHING
DESCRIPTORS: OTHER (COMMENT)

## 2024-05-04 NOTE — PROGRESS NOTES
4 Eyes Skin Assessment     NAME:  Breezy Hernández  YOB: 1931  MEDICAL RECORD NUMBER:  [id number]    The patient is being assessed for  Admission    I agree that at least one RN has performed a thorough Head to Toe Skin Assessment on the patient. ALL assessment sites listed below have been assessed.      Areas assessed by both nurses:    Head, Face, Ears, Shoulders, Back, Chest, Arms, Elbows, Hands, Sacrum. Buttock, Coccyx, Ischium, Legs. Feet and Heels, and Under Medical Devices         Does the Patient have a Wound? No noted wound(s)       [place]ont Prevention initiated by RN: Yes  Wound Care Orders initiated by RN: No    Pressure Injury (Stage 3,4, Unstageable, DTI, NWPT, and Complex wounds) if present, place Wound referral order by RN under : No    New Ostomies, if present place, Ostomy referral order under : No     Nurse 1 eSignature: Electronically signed by Salima العراقي RN on 5/4/24 at 7:48 AM EDT    **SHARE this note so that the co-signing nurse can place an eSignature**    Nurse 2 eSignature: Electronically signed by Mitali White RN on 5/4/24 at 2:15 PM EDT

## 2024-05-04 NOTE — FLOWSHEET NOTE
[facility] RN rounded on pt for DI score of 54.  Pt in bed awake resting quietly.  PT has no C/O pain. PT stated she slept well last night.  Family arrived and at bedside.  No questions at this time.  VSS except HR is elevated at times.  PT niece stated she fell last night then came to the ER,  pt has no C/O pain from fall.  She is [place].  No other needs at this time.    Refill was sent to pharmacy as requested

## 2024-05-04 NOTE — CONSULTS
Orthopaedic Consult    Patient Name: Breezy Hernández   (3/26/1931)  MRN   [id number]   Today's date:  5/4/2024    CHIEF COMPLAINT:   Left hip pain    HISTORY OF PRESENT ILLNESS:      The patient is a 89 y.o. female  who presented to the ER after a fall.      This was a fall from standing height which occurred at home when the patient tripped over the carpet in her bathroom earlier this morning when attempting to use the bathroom.    After the fall the patient was unable to ambulate and the pain was sudden, sharp and rated 10/10.  X-rays were done in the ER showing a displaced basicervical left femoral neck fracture.  Patient was subsequently admitted by the hospitalist to undergo preoperative medical management for planned operative intervention.    Patient does not communicate well due to being hard of hearing and having cognitive dysfunction.  I was able to discuss the patient directly with her niece who is the POA.    The patient denied any chest pain, shortness of breath or syncopal episode prior to the fall.    Prior to the fall, the patient ambulated with a walker    Current anticoagulation: Eliquis, currently held    Patient denies prior left hip pain  Patient denies prior left hip surgery      Past Medical History         Diagnosis Date    A-fib (HCC)     Arthritis     Thyroid disease        Past Surgical History         Procedure Laterality Date    APPENDECTOMY      CHOLECYSTECTOMY      EYE SURGERY      FRACTURE SURGERY      HYSTERECTOMY (CERVIX STATUS UNKNOWN)      TONSILLECTOMY         Medications Prior to Admission:     Prior to Admission medications    Medication Sig Start Date End Date Taking? Authorizing Provider   dilTIAZem (DILACOR XR) 240 MG extended release capsule Take 1 capsule by mouth daily 3/22/24   Adam Andrews MD   metoprolol tartrate (LOPRESSOR) 25 MG tablet TAKE 1/2 TABLET BY MOUTH TWICE A DAY 2/15/24   Adam Andrews MD   dilTIAZem (CARDIZEM CD) 180 MG extended release capsule  displacement.      Assessment and Plan     1.  Left displaced basicervical femoral neck fracture    The patient has been admitted to the hospitalist service for pre-operative and post-operative medical management.    Based on the fracture pattern I recommend hip cephalomedullary nailing. The goal of surgical intervention is pain control, fracture stabilization and mobility.  Postoperatively the patient will remain in the hospital for pain control, physical therapy, and medical optimization.    Surgical risks and benefits were explained to the patient as well as the family; these included but not limited to infection, hardware complications, dislocation, further displacement and rotation due to the basicervical nature of the fracture, avascular necrosis, nonunion, need for further surgery, DVT, medical complications, and continued pain and difficulty ambulating after surgical intervention.  I also explained the expected amount of blood loss during the surgery as well as risk of neurovascular damage and death.    I did have a conversation with the patient and their family regarding nonoperative care and what this would entail and they would like to proceed with surgical intervention.    Postoperatively the patient will be weightbearing as tolerated and physical therapy will begin immediately.      Post-operatively, we will assess the patients discharge needs with the help of case management.  The patient will likely require a stay at a skilled nursing unit or ARU.    The patient and family, including the niece who is her POA, understand the risks and benefits of surgery and wishes to proceed with operative intervention.     We are awaiting clearance by the hospitalist team.  Plan will be for surgery tomorrow morning due to patient being on blood thinners and the acuity of the injury.  She will be n.p.o. at midnight tonight    Will obtain formal consent tomorrow morning    Francisco Montilla DO

## 2024-05-04 NOTE — ED PROVIDER NOTES
anticoagulation until after procedure can be completed.    5:01 AM EDT  Patient's heart rate is starting to fluctuate now going from 90s all the way up to 160s (vitals are otherwise reassuring) so we will give her a bolus of Cardizem and will update  physician at [place].      Is this patient to be included in the SEP-1 core measure due to severe sepsis or septic shock? No Exclusion criteria - the patient is NOT to be included for SEP-1 Core Measure due to: Infection is not suspected     Disposition: Transferred to [facility]     I am the primary physician of record    Clinical Impression:  1. Closed fracture of left hip, initial encounter ([facility])      Disposition referral (if applicable):  No follow-up provider specified.  Disposition medications (if applicable):  New Prescriptions    No medications on file       Comment: Please note this report has been produced using speech recognition software and may contain errors related to that system including errors in grammar, punctuation, and spelling, as well as words and phrases that may be inappropriate. If there are any questions or concerns please feel free to contact the dictating provider for clarification.       Héctor Coleman MD  05/04/24 [id number]

## 2024-05-04 NOTE — CONSULTS
INPATIENT CARDIOLOGY CONSULT NOTE         Reason for consultation:   AFIB    History of present illness:Breezy is a 89 y.o.year old who is admitted with left hip pain s/p fall and hip fracture.  Cardio consulted to evaluate patient for A-fib RVR     Patient has prior history of paroxysmal atrial fibrillation, initially diagnosed in January 2023, without any follow-up with cardiology history of dementia hypertension.    Cardiology consulted for A-fib RVR      Pertinent Lab Personally Review     Recent Labs     05/04/24  0355   WBC 11.8*   HGB 14.6   HCT 44.9         Recent Labs     05/04/24  0355      K 4.5      CO2 23   BUN 26*   CREATININE 1.3*     Recent Labs     05/04/24 0355   AST 27   ALT 26   BILITOT 0.3   ALKPHOS 146*     Lab Results   Component Value Date    PROBNP 983.7 (H) 02/13/2024    PROBNP 9,763 (H) 02/10/2024    PROBNP 2,840 (H) 11/16/2023         Past medical history:    has a past medical history of A-fib (HCC), Arthritis, and Thyroid disease.  Past surgical history:   has a past surgical history that includes Tonsillectomy; Appendectomy; Cholecystectomy; Hysterectomy; eye surgery; and fracture surgery.  Social History:   reports that she has never smoked. She has never used smokeless tobacco. She reports that she does not drink alcohol and does not use drugs.  Family history:   no family history of CAD, STROKE of DM    No Known Allergies    acetaminophen (TYLENOL) tablet 1,000 mg, Once  lactated ringers IV soln infusion, Continuous  sodium chloride flush 0.9 % injection 5-40 mL, 2 times per day  sodium chloride flush 0.9 % injection 5-40 mL, PRN  0.9 % sodium chloride infusion, PRN  ceFAZolin (ANCEF) 1,000 mg in sterile water 10 mL IV syringe, On Call to OR  dilTIAZem (CARDIZEM CD) extended release capsule 240 mg, Daily  hydrOXYzine (ATARAX) 10 MG/5ML syrup 10 mg, TID  levothyroxine (SYNTHROID) tablet 75 mcg, Daily  metoprolol tartrate (LOPRESSOR) tablet 12.5 mg, BID  [START  rate control strategy.  She was started on calcium channel blocker Cardizem, metoprolol and Eliquis.  Patient did not follow-up outpatient however.    No patient presents s/p fall with left femoral neck fracture and is anticipating surgery tomorrow morning.    She is also noted to have atrial fibrillation with rapid ventricular response  Stop restart patient on diltiazem and metoprolol  Hold off on oral anticoagulation pending surgery, continue with Lovenox weight-based.    Will continue to pursue rate control strategy  Continue with IV Cardizem drip for now, will likely stop in postop setting.  Recommend outpatient follow-up  Case discussed with patient's family at bedside    Patient is deemed moderate risk for low risk surgery from cardiovascular standpoint.  Cardiology will be available in perioperative phase as needed    Prior documentations in EMR were personally reviewed at length  EKGs, labs, imaging were personally reviewed and interpreted  Case discussed with        Nursing Staff  Family members  Thank you for the consult       Dr. SIOBHAN Andrews  5/4/2024 5:44 PM

## 2024-05-04 NOTE — H&P
SECTION GG    Self Care Performance:   Oral Hygiene: [employer] sets up or cleans up; patient completes activity. [employer]  assists only prior to or following the activity.   Toileting Hygiene: [employer] does less than half the effort. [employer] lifts, holds  or supports trunk or limbs but provides less than half the effort.   Shower/Bathe Self: [employer] does less than half the effort. [employer] lifts, holds  or supports trunk or limbs but provides less than half the effort.   Lower Body Dressing: [employer] does more than half the effort. [employer] lifts or  holds trunk or limbs and provides more than half the effort.   Putting On/Taking Off Footwear: [employer] does more than half the effort. [employer]  lifts or holds trunk or limbs and provides more than half the effort.    Self Care Discharge Goals:   Oral Hygiene: Patient completed the activities by themself with no assistance  from a helper.   Toileting Hygiene: Patient completed the activities by themself with no  assistance from a helper.   Shower/Bathe Self: Patient completed the activities by themself with no  assistance from a helper.   Upper Body Dressing: Patient completed the activities by themself with no  assistance from a helper.   Lower Body Dressing: Patient completed the activities by themself with no  assistance from a helper.   Putting On/Taking Off Footwear: Patient completed the activities by themself  with no assistance from a helper.    Mobility Toilet Transfer Performance: [employer] provides verbal cues or  touching/steadying assistance as patient completes activity.    Mobility Toilet Transfer Discharge Goal: Patient completed the activities by  themself with no assistance from a helper.    Signed by: Main De La Garza OT     rebound.   Musculoskeletal:         General: No tenderness (no tenderness when palpation left hip).      Cervical back: No tenderness.      Right lower leg: Edema (c) present.      Left lower leg: Edema (trace to 1+) present.   Lymphadenopathy:      Cervical: No cervical adenopathy.   Neurological:      General: No focal deficit present.      Mental Status: She is alert.      Comments: Difficult to follow instructions because of her hearing and dementia  No obvious deficits   Psychiatric:         Mood and Affect: Mood normal.         Behavior: Behavior normal.            Past Medical History:   PMHx   Past Medical History:   Diagnosis Date    A-fib (HCC)     Arthritis     Thyroid disease      PSHX:  has a past surgical history that includes Tonsillectomy; Appendectomy; Cholecystectomy; Hysterectomy; eye surgery; and fracture surgery.  Allergies: No Known Allergies  Fam HX:  family history is not on file.  Soc HX:   Social History     Socioeconomic History    Marital status:    Tobacco Use    Smoking status: Never    Smokeless tobacco: Never   Vaping Use    Vaping Use: Never used   Substance and Sexual Activity    Alcohol use: No    Drug use: No    Sexual activity: Never     Social Determinants of Health     Food Insecurity: No Food Insecurity (5/4/2024)    Hunger Vital Sign     Worried About Running Out of Food in the Last Year: Never true     Ran Out of Food in the Last Year: Never true   Transportation Needs: No Transportation Needs (5/4/2024)    PRAPARE - Transportation     Lack of Transportation (Medical): No     Lack of Transportation (Non-Medical): No   Housing Stability: Low Risk  (5/4/2024)    Housing Stability Vital Sign     Unable to Pay for Housing in the Last Year: No     Number of Places Lived in the Last Year: 1     Unstable Housing in the Last Year: No       Medications:   Medications:    acetaminophen  1,000 mg Oral Once    sodium chloride flush  5-40 mL IntraVENous 2 times per day     ceFAZolin (ANCEF) IVPB  1,000 mg IntraVENous On Call to OR    dilTIAZem  240 mg Oral Daily    hydrOXYzine  10 mg Oral TID    levothyroxine  75 mcg Oral Daily    metoprolol tartrate  12.5 mg Oral BID    [START ON 5/5/2024] pantoprazole  40 mg Oral QAM AC    sodium chloride flush  5-40 mL IntraVENous 2 times per day    metoprolol  5 mg IntraVENous Once      Infusions:    lactated ringers IV soln 125 mL/hr at 05/04/24 0934    sodium chloride      sodium chloride       PRN Meds: sodium chloride flush, 5-40 mL, PRN  sodium chloride, , PRN  sodium chloride flush, 5-40 mL, PRN  sodium chloride, , PRN  ondansetron, 4 mg, Q8H PRN   Or  ondansetron, 4 mg, Q6H PRN  polyethylene glycol, 17 g, Daily PRN  acetaminophen, 650 mg, Q6H PRN   Or  acetaminophen, 650 mg, Q6H PRN  potassium chloride, 40 mEq, PRN   Or  potassium alternative oral replacement, 40 mEq, PRN   Or  potassium chloride, 10 mEq, PRN  magnesium sulfate, 2,000 mg, PRN  morphine, 2 mg, Q4H PRN        Labs      CBC:   Recent Labs     05/04/24  0355   WBC 11.8*   HGB 14.6        BMP:    Recent Labs     05/04/24  0355      K 4.5      CO2 23   BUN 26*   CREATININE 1.3*   GLUCOSE 127*     Hepatic:   Recent Labs     05/04/24  0355   AST 27   ALT 26   BILITOT 0.3   ALKPHOS 146*     Lipids: No results found for: \"CHOL\", \"HDL\", \"TRIG\"  Hemoglobin A1C: No results found for: \"LABA1C\"  TSH: No results found for: \"TSH\"  Troponin:   Lab Results   Component Value Date/Time    TROPONINT <0.010 02/15/2023 03:30 PM    TROPONINT 0.018 01/14/2023 09:20 PM    TROPONINT 0.015 01/14/2023 01:15 PM     Lactic Acid: No results for input(s): \"LACTA\" in the last 72 hours.  BNP: No results for input(s): \"PROBNP\" in the last 72 hours.  UA:  Lab Results   Component Value Date/Time    NITRU NEGATIVE 02/10/2024 04:15 PM    COLORU YELLOW 02/10/2024 04:15 PM    PHUR 6.0 02/10/2024 04:15 PM    WBCUA 2 02/10/2024 04:15 PM    RBCUA NEGATIVE 02/10/2024 04:15 PM    MUCUS 2+ 01/12/2022

## 2024-05-05 ENCOUNTER — ANESTHESIA (OUTPATIENT)
Dept: OPERATING ROOM | Age: 89
End: 2024-05-05
Payer: MEDICARE

## 2024-05-05 ENCOUNTER — APPOINTMENT (OUTPATIENT)
Dept: GENERAL RADIOLOGY | Age: 89
End: 2024-05-05
Attending: INTERNAL MEDICINE
Payer: MEDICARE

## 2024-05-05 LAB
ALBUMIN SERPL-MCNC: 3.5 GM/DL (ref 3.4–5)
ALP BLD-CCNC: 145 IU/L (ref 40–128)
ALT SERPL-CCNC: 85 U/L (ref 10–40)
ANION GAP SERPL CALCULATED.3IONS-SCNC: 10 MMOL/L (ref 7–16)
AST SERPL-CCNC: 155 IU/L (ref 15–37)
BASOPHILS ABSOLUTE: 0 K/CU MM
BASOPHILS RELATIVE PERCENT: 0.4 % (ref 0–1)
BILIRUB SERPL-MCNC: 0.8 MG/DL (ref 0–1)
BUN SERPL-MCNC: 22 MG/DL (ref 6–23)
CALCIUM SERPL-MCNC: 8.3 MG/DL (ref 8.3–10.6)
CHLORIDE BLD-SCNC: 109 MMOL/L (ref 99–110)
CO2: 24 MMOL/L (ref 21–32)
CREAT SERPL-MCNC: 1.1 MG/DL (ref 0.6–1.1)
DIFFERENTIAL TYPE: ABNORMAL
EOSINOPHILS ABSOLUTE: 0.1 K/CU MM
EOSINOPHILS RELATIVE PERCENT: 0.5 % (ref 0–3)
GFR, ESTIMATED: 47 ML/MIN/1.73M2
GLUCOSE SERPL-MCNC: 116 MG/DL (ref 70–99)
HCT VFR BLD CALC: 39.7 % (ref 37–47)
HEMOGLOBIN: 12.4 GM/DL (ref 12.5–16)
IMMATURE NEUTROPHIL %: 0.4 % (ref 0–0.43)
LYMPHOCYTES ABSOLUTE: 1.6 K/CU MM
LYMPHOCYTES RELATIVE PERCENT: 15.5 % (ref 24–44)
MCH RBC QN AUTO: 30 PG (ref 27–31)
MCHC RBC AUTO-ENTMCNC: 31.2 % (ref 32–36)
MCV RBC AUTO: 96.1 FL (ref 78–100)
MONOCYTES ABSOLUTE: 1.3 K/CU MM
MONOCYTES RELATIVE PERCENT: 12.2 % (ref 0–4)
NEUTROPHILS ABSOLUTE: 7.4 K/CU MM
NEUTROPHILS RELATIVE PERCENT: 71 % (ref 36–66)
NUCLEATED RBC %: 0 %
PDW BLD-RTO: 14.6 % (ref 11.7–14.9)
PLATELET # BLD: 188 K/CU MM (ref 140–440)
PMV BLD AUTO: 10.8 FL (ref 7.5–11.1)
POTASSIUM SERPL-SCNC: 4.8 MMOL/L (ref 3.5–5.1)
RBC # BLD: 4.13 M/CU MM (ref 4.2–5.4)
SODIUM BLD-SCNC: 143 MMOL/L (ref 135–145)
TOTAL IMMATURE NEUTOROPHIL: 0.04 K/CU MM
TOTAL NUCLEATED RBC: 0 K/CU MM
TOTAL PROTEIN: 6 GM/DL (ref 6.4–8.2)
WBC # BLD: 10.4 K/CU MM (ref 4–10.5)

## 2024-05-05 PROCEDURE — 94761 N-INVAS EAR/PLS OXIMETRY MLT: CPT

## 2024-05-05 PROCEDURE — 3600000014 HC SURGERY LEVEL 4 ADDTL 15MIN: Performed by: STUDENT IN AN ORGANIZED HEALTH CARE EDUCATION/TRAINING PROGRAM

## 2024-05-05 PROCEDURE — 2140000000 HC CCU INTERMEDIATE R&B

## 2024-05-05 PROCEDURE — 6360000002 HC RX W HCPCS: Performed by: STUDENT IN AN ORGANIZED HEALTH CARE EDUCATION/TRAINING PROGRAM

## 2024-05-05 PROCEDURE — C1769 GUIDE WIRE: HCPCS | Performed by: STUDENT IN AN ORGANIZED HEALTH CARE EDUCATION/TRAINING PROGRAM

## 2024-05-05 PROCEDURE — 7100000001 HC PACU RECOVERY - ADDTL 15 MIN: Performed by: STUDENT IN AN ORGANIZED HEALTH CARE EDUCATION/TRAINING PROGRAM

## 2024-05-05 PROCEDURE — 80053 COMPREHEN METABOLIC PANEL: CPT

## 2024-05-05 PROCEDURE — 27245 TREAT THIGH FRACTURE: CPT | Performed by: STUDENT IN AN ORGANIZED HEALTH CARE EDUCATION/TRAINING PROGRAM

## 2024-05-05 PROCEDURE — 2580000003 HC RX 258: Performed by: INTERNAL MEDICINE

## 2024-05-05 PROCEDURE — 3600000004 HC SURGERY LEVEL 4 BASE: Performed by: STUDENT IN AN ORGANIZED HEALTH CARE EDUCATION/TRAINING PROGRAM

## 2024-05-05 PROCEDURE — 6370000000 HC RX 637 (ALT 250 FOR IP): Performed by: STUDENT IN AN ORGANIZED HEALTH CARE EDUCATION/TRAINING PROGRAM

## 2024-05-05 PROCEDURE — 99233 SBSQ HOSP IP/OBS HIGH 50: CPT | Performed by: INTERNAL MEDICINE

## 2024-05-05 PROCEDURE — 85025 COMPLETE CBC W/AUTO DIFF WBC: CPT

## 2024-05-05 PROCEDURE — 3700000000 HC ANESTHESIA ATTENDED CARE: Performed by: STUDENT IN AN ORGANIZED HEALTH CARE EDUCATION/TRAINING PROGRAM

## 2024-05-05 PROCEDURE — 0QS706Z REPOSITION LEFT UPPER FEMUR WITH INTRAMEDULLARY INTERNAL FIXATION DEVICE, OPEN APPROACH: ICD-10-PCS | Performed by: STUDENT IN AN ORGANIZED HEALTH CARE EDUCATION/TRAINING PROGRAM

## 2024-05-05 PROCEDURE — 2580000003 HC RX 258

## 2024-05-05 PROCEDURE — 6360000002 HC RX W HCPCS

## 2024-05-05 PROCEDURE — 3700000001 HC ADD 15 MINUTES (ANESTHESIA): Performed by: STUDENT IN AN ORGANIZED HEALTH CARE EDUCATION/TRAINING PROGRAM

## 2024-05-05 PROCEDURE — 2700000000 HC OXYGEN THERAPY PER DAY

## 2024-05-05 PROCEDURE — 7100000000 HC PACU RECOVERY - FIRST 15 MIN: Performed by: STUDENT IN AN ORGANIZED HEALTH CARE EDUCATION/TRAINING PROGRAM

## 2024-05-05 PROCEDURE — 2580000003 HC RX 258: Performed by: STUDENT IN AN ORGANIZED HEALTH CARE EDUCATION/TRAINING PROGRAM

## 2024-05-05 PROCEDURE — 36415 COLL VENOUS BLD VENIPUNCTURE: CPT

## 2024-05-05 PROCEDURE — 2709999900 HC NON-CHARGEABLE SUPPLY: Performed by: STUDENT IN AN ORGANIZED HEALTH CARE EDUCATION/TRAINING PROGRAM

## 2024-05-05 PROCEDURE — 99221 1ST HOSP IP/OBS SF/LOW 40: CPT | Performed by: STUDENT IN AN ORGANIZED HEALTH CARE EDUCATION/TRAINING PROGRAM

## 2024-05-05 PROCEDURE — 76000 FLUOROSCOPY <1 HR PHYS/QHP: CPT

## 2024-05-05 PROCEDURE — 2500000003 HC RX 250 WO HCPCS: Performed by: INTERNAL MEDICINE

## 2024-05-05 PROCEDURE — 2720000010 HC SURG SUPPLY STERILE: Performed by: STUDENT IN AN ORGANIZED HEALTH CARE EDUCATION/TRAINING PROGRAM

## 2024-05-05 PROCEDURE — C1713 ANCHOR/SCREW BN/BN,TIS/BN: HCPCS | Performed by: STUDENT IN AN ORGANIZED HEALTH CARE EDUCATION/TRAINING PROGRAM

## 2024-05-05 PROCEDURE — 2580000003 HC RX 258: Performed by: HOSPITALIST

## 2024-05-05 DEVICE — ZNN CMN LAG SCREW 10.5X80
Type: IMPLANTABLE DEVICE | Site: HIP | Status: FUNCTIONAL
Brand: ZIMMER® NATURAL NAIL® SYSTEM

## 2024-05-05 DEVICE — IMPLANTABLE DEVICE: Type: IMPLANTABLE DEVICE | Site: HIP | Status: FUNCTIONAL

## 2024-05-05 DEVICE — ZNN CMN NAIL 10MMX21.5CM 130L
Type: IMPLANTABLE DEVICE | Site: HIP | Status: FUNCTIONAL
Brand: ZIMMER® NATURAL NAIL® SYSTEM

## 2024-05-05 DEVICE — 3.2 MM THREADED PIN
Type: IMPLANTABLE DEVICE | Site: HIP | Status: FUNCTIONAL
Brand: ZIMMER® NATURAL NAIL™ SYSTEM

## 2024-05-05 RX ORDER — SODIUM CHLORIDE 0.9 % (FLUSH) 0.9 %
5-40 SYRINGE (ML) INJECTION EVERY 12 HOURS SCHEDULED
Status: DISCONTINUED | OUTPATIENT
Start: 2024-05-05 | End: 2024-05-09 | Stop reason: HOSPADM

## 2024-05-05 RX ORDER — LIDOCAINE HYDROCHLORIDE 20 MG/ML
INJECTION, SOLUTION INTRAVENOUS PRN
Status: DISCONTINUED | OUTPATIENT
Start: 2024-05-05 | End: 2024-05-05 | Stop reason: SDUPTHER

## 2024-05-05 RX ORDER — DEXAMETHASONE SODIUM PHOSPHATE 4 MG/ML
INJECTION, SOLUTION INTRA-ARTICULAR; INTRALESIONAL; INTRAMUSCULAR; INTRAVENOUS; SOFT TISSUE PRN
Status: DISCONTINUED | OUTPATIENT
Start: 2024-05-05 | End: 2024-05-05 | Stop reason: SDUPTHER

## 2024-05-05 RX ORDER — SODIUM CHLORIDE 0.9 % (FLUSH) 0.9 %
5-40 SYRINGE (ML) INJECTION PRN
Status: DISCONTINUED | OUTPATIENT
Start: 2024-05-05 | End: 2024-05-05 | Stop reason: HOSPADM

## 2024-05-05 RX ORDER — ONDANSETRON 2 MG/ML
INJECTION INTRAMUSCULAR; INTRAVENOUS PRN
Status: DISCONTINUED | OUTPATIENT
Start: 2024-05-05 | End: 2024-05-05 | Stop reason: SDUPTHER

## 2024-05-05 RX ORDER — HEPARIN SODIUM 5000 [USP'U]/ML
5000 INJECTION, SOLUTION INTRAVENOUS; SUBCUTANEOUS 2 TIMES DAILY
Status: DISCONTINUED | OUTPATIENT
Start: 2024-05-05 | End: 2024-05-09 | Stop reason: HOSPADM

## 2024-05-05 RX ORDER — BUPIVACAINE HYDROCHLORIDE 5 MG/ML
INJECTION, SOLUTION PERINEURAL
Status: COMPLETED | OUTPATIENT
Start: 2024-05-05 | End: 2024-05-05

## 2024-05-05 RX ORDER — FENTANYL CITRATE 50 UG/ML
INJECTION, SOLUTION INTRAMUSCULAR; INTRAVENOUS PRN
Status: DISCONTINUED | OUTPATIENT
Start: 2024-05-05 | End: 2024-05-05 | Stop reason: SDUPTHER

## 2024-05-05 RX ORDER — PROPOFOL 10 MG/ML
INJECTION, EMULSION INTRAVENOUS PRN
Status: DISCONTINUED | OUTPATIENT
Start: 2024-05-05 | End: 2024-05-05 | Stop reason: SDUPTHER

## 2024-05-05 RX ORDER — SODIUM CHLORIDE, SODIUM LACTATE, POTASSIUM CHLORIDE, CALCIUM CHLORIDE 600; 310; 30; 20 MG/100ML; MG/100ML; MG/100ML; MG/100ML
INJECTION, SOLUTION INTRAVENOUS CONTINUOUS PRN
Status: DISCONTINUED | OUTPATIENT
Start: 2024-05-05 | End: 2024-05-05 | Stop reason: SDUPTHER

## 2024-05-05 RX ORDER — SODIUM CHLORIDE, SODIUM LACTATE, POTASSIUM CHLORIDE, CALCIUM CHLORIDE 600; 310; 30; 20 MG/100ML; MG/100ML; MG/100ML; MG/100ML
INJECTION, SOLUTION INTRAVENOUS CONTINUOUS
Status: DISPENSED | OUTPATIENT
Start: 2024-05-05 | End: 2024-05-06

## 2024-05-05 RX ORDER — NALOXONE HYDROCHLORIDE 0.4 MG/ML
INJECTION, SOLUTION INTRAMUSCULAR; INTRAVENOUS; SUBCUTANEOUS PRN
Status: DISCONTINUED | OUTPATIENT
Start: 2024-05-05 | End: 2024-05-05 | Stop reason: HOSPADM

## 2024-05-05 RX ORDER — SODIUM CHLORIDE 0.9 % (FLUSH) 0.9 %
5-40 SYRINGE (ML) INJECTION PRN
Status: DISCONTINUED | OUTPATIENT
Start: 2024-05-05 | End: 2024-05-05

## 2024-05-05 RX ORDER — ESMOLOL HYDROCHLORIDE 10 MG/ML
INJECTION INTRAVENOUS PRN
Status: DISCONTINUED | OUTPATIENT
Start: 2024-05-05 | End: 2024-05-05 | Stop reason: SDUPTHER

## 2024-05-05 RX ORDER — SODIUM CHLORIDE, SODIUM LACTATE, POTASSIUM CHLORIDE, CALCIUM CHLORIDE 600; 310; 30; 20 MG/100ML; MG/100ML; MG/100ML; MG/100ML
INJECTION, SOLUTION INTRAVENOUS CONTINUOUS
Status: DISCONTINUED | OUTPATIENT
Start: 2024-05-05 | End: 2024-05-05

## 2024-05-05 RX ORDER — OXYCODONE HYDROCHLORIDE 5 MG/1
5 TABLET ORAL
Status: DISCONTINUED | OUTPATIENT
Start: 2024-05-05 | End: 2024-05-05 | Stop reason: HOSPADM

## 2024-05-05 RX ORDER — SODIUM CHLORIDE 9 MG/ML
INJECTION, SOLUTION INTRAVENOUS PRN
Status: DISCONTINUED | OUTPATIENT
Start: 2024-05-05 | End: 2024-05-09 | Stop reason: HOSPADM

## 2024-05-05 RX ORDER — OXYCODONE HYDROCHLORIDE 10 MG/1
10 TABLET ORAL EVERY 4 HOURS PRN
Status: DISCONTINUED | OUTPATIENT
Start: 2024-05-05 | End: 2024-05-09 | Stop reason: HOSPADM

## 2024-05-05 RX ORDER — METOPROLOL TARTRATE 1 MG/ML
5 INJECTION, SOLUTION INTRAVENOUS ONCE
Status: COMPLETED | OUTPATIENT
Start: 2024-05-05 | End: 2024-05-05

## 2024-05-05 RX ORDER — ACETAMINOPHEN 325 MG/1
650 TABLET ORAL EVERY 4 HOURS PRN
Status: DISCONTINUED | OUTPATIENT
Start: 2024-05-05 | End: 2024-05-09 | Stop reason: HOSPADM

## 2024-05-05 RX ORDER — SODIUM CHLORIDE 0.9 % (FLUSH) 0.9 %
5-40 SYRINGE (ML) INJECTION PRN
Status: DISCONTINUED | OUTPATIENT
Start: 2024-05-05 | End: 2024-05-09 | Stop reason: HOSPADM

## 2024-05-05 RX ORDER — SODIUM CHLORIDE 9 MG/ML
INJECTION, SOLUTION INTRAVENOUS PRN
Status: DISCONTINUED | OUTPATIENT
Start: 2024-05-05 | End: 2024-05-05 | Stop reason: HOSPADM

## 2024-05-05 RX ORDER — SODIUM CHLORIDE 0.9 % (FLUSH) 0.9 %
5-40 SYRINGE (ML) INJECTION EVERY 12 HOURS SCHEDULED
Status: DISCONTINUED | OUTPATIENT
Start: 2024-05-05 | End: 2024-05-05 | Stop reason: HOSPADM

## 2024-05-05 RX ORDER — PHENYLEPHRINE HCL IN 0.9% NACL 1 MG/10 ML
SYRINGE (ML) INTRAVENOUS PRN
Status: DISCONTINUED | OUTPATIENT
Start: 2024-05-05 | End: 2024-05-05 | Stop reason: SDUPTHER

## 2024-05-05 RX ORDER — ONDANSETRON 2 MG/ML
4 INJECTION INTRAMUSCULAR; INTRAVENOUS
Status: DISCONTINUED | OUTPATIENT
Start: 2024-05-05 | End: 2024-05-05 | Stop reason: HOSPADM

## 2024-05-05 RX ORDER — SODIUM CHLORIDE 9 MG/ML
INJECTION, SOLUTION INTRAVENOUS PRN
Status: DISCONTINUED | OUTPATIENT
Start: 2024-05-05 | End: 2024-05-05

## 2024-05-05 RX ORDER — SODIUM CHLORIDE 0.9 % (FLUSH) 0.9 %
5-40 SYRINGE (ML) INJECTION EVERY 12 HOURS SCHEDULED
Status: DISCONTINUED | OUTPATIENT
Start: 2024-05-05 | End: 2024-05-05

## 2024-05-05 RX ORDER — ACETAMINOPHEN 500 MG
1000 TABLET ORAL ONCE
Status: DISCONTINUED | OUTPATIENT
Start: 2024-05-05 | End: 2024-05-05

## 2024-05-05 RX ORDER — OXYCODONE HYDROCHLORIDE 5 MG/1
5 TABLET ORAL EVERY 4 HOURS PRN
Status: DISCONTINUED | OUTPATIENT
Start: 2024-05-05 | End: 2024-05-09 | Stop reason: HOSPADM

## 2024-05-05 RX ORDER — CEFAZOLIN SODIUM 1 G/3ML
INJECTION, POWDER, FOR SOLUTION INTRAMUSCULAR; INTRAVENOUS PRN
Status: DISCONTINUED | OUTPATIENT
Start: 2024-05-05 | End: 2024-05-05 | Stop reason: SDUPTHER

## 2024-05-05 RX ORDER — ENOXAPARIN SODIUM 100 MG/ML
40 INJECTION SUBCUTANEOUS DAILY
Status: DISCONTINUED | OUTPATIENT
Start: 2024-05-05 | End: 2024-05-05

## 2024-05-05 RX ADMIN — SODIUM CHLORIDE, POTASSIUM CHLORIDE, SODIUM LACTATE AND CALCIUM CHLORIDE: 600; 310; 30; 20 INJECTION, SOLUTION INTRAVENOUS at 15:16

## 2024-05-05 RX ADMIN — MORPHINE SULFATE 2 MG: 2 INJECTION, SOLUTION INTRAMUSCULAR; INTRAVENOUS at 19:57

## 2024-05-05 RX ADMIN — DEXAMETHASONE SODIUM PHOSPHATE 4 MG: 4 INJECTION, SOLUTION INTRAMUSCULAR; INTRAVENOUS at 08:39

## 2024-05-05 RX ADMIN — FENTANYL CITRATE 50 MCG: 50 INJECTION, SOLUTION INTRAMUSCULAR; INTRAVENOUS at 09:46

## 2024-05-05 RX ADMIN — FENTANYL CITRATE 50 MCG: 50 INJECTION, SOLUTION INTRAMUSCULAR; INTRAVENOUS at 10:02

## 2024-05-05 RX ADMIN — LIDOCAINE HYDROCHLORIDE 50 MG: 20 INJECTION, SOLUTION INTRAVENOUS at 08:28

## 2024-05-05 RX ADMIN — SODIUM CHLORIDE, POTASSIUM CHLORIDE, SODIUM LACTATE AND CALCIUM CHLORIDE: 600; 310; 30; 20 INJECTION, SOLUTION INTRAVENOUS at 08:25

## 2024-05-05 RX ADMIN — ONDANSETRON 4 MG: 2 INJECTION INTRAMUSCULAR; INTRAVENOUS at 08:39

## 2024-05-05 RX ADMIN — SODIUM CHLORIDE, PRESERVATIVE FREE 10 ML: 5 INJECTION INTRAVENOUS at 05:43

## 2024-05-05 RX ADMIN — SODIUM CHLORIDE, PRESERVATIVE FREE 10 ML: 5 INJECTION INTRAVENOUS at 19:58

## 2024-05-05 RX ADMIN — PROPOFOL 50 MG: 10 INJECTION, EMULSION INTRAVENOUS at 08:28

## 2024-05-05 RX ADMIN — WATER 1000 MG: 1 INJECTION INTRAMUSCULAR; INTRAVENOUS; SUBCUTANEOUS at 16:37

## 2024-05-05 RX ADMIN — DILTIAZEM HYDROCHLORIDE 240 MG: 240 CAPSULE, EXTENDED RELEASE ORAL at 13:45

## 2024-05-05 RX ADMIN — METOPROLOL TARTRATE 12.5 MG: 25 TABLET, FILM COATED ORAL at 13:45

## 2024-05-05 RX ADMIN — ESMOLOL HYDROCHLORIDE 20 MG: 10 INJECTION, SOLUTION INTRAVENOUS at 09:46

## 2024-05-05 RX ADMIN — Medication 0.2 MG: at 09:29

## 2024-05-05 RX ADMIN — Medication 0.2 MG: at 08:28

## 2024-05-05 RX ADMIN — HEPARIN SODIUM 5000 UNITS: 5000 INJECTION INTRAVENOUS; SUBCUTANEOUS at 19:58

## 2024-05-05 RX ADMIN — METOPROLOL TARTRATE 12.5 MG: 25 TABLET, FILM COATED ORAL at 23:30

## 2024-05-05 RX ADMIN — HYDROXYZINE HYDROCHLORIDE 10 MG: 10 TABLET ORAL at 13:45

## 2024-05-05 RX ADMIN — METOPROLOL TARTRATE 5 MG: 5 INJECTION INTRAVENOUS at 13:58

## 2024-05-05 RX ADMIN — CEFAZOLIN 1 G: 1 INJECTION, POWDER, FOR SOLUTION INTRAMUSCULAR; INTRAVENOUS; PARENTERAL at 08:40

## 2024-05-05 RX ADMIN — Medication 0.2 MG: at 08:56

## 2024-05-05 RX ADMIN — FENTANYL CITRATE 50 MCG: 50 INJECTION, SOLUTION INTRAMUSCULAR; INTRAVENOUS at 09:09

## 2024-05-05 RX ADMIN — FENTANYL CITRATE 50 MCG: 50 INJECTION, SOLUTION INTRAMUSCULAR; INTRAVENOUS at 08:28

## 2024-05-05 ASSESSMENT — PAIN SCALES - GENERAL
PAINLEVEL_OUTOF10: 0
PAINLEVEL_OUTOF10: 8

## 2024-05-05 ASSESSMENT — PAIN DESCRIPTION - ORIENTATION: ORIENTATION: LEFT

## 2024-05-05 ASSESSMENT — PAIN - FUNCTIONAL ASSESSMENT: PAIN_FUNCTIONAL_ASSESSMENT: PREVENTS OR INTERFERES SOME ACTIVE ACTIVITIES AND ADLS

## 2024-05-05 ASSESSMENT — PAIN DESCRIPTION - DESCRIPTORS: DESCRIPTORS: ACHING

## 2024-05-05 ASSESSMENT — PAIN DESCRIPTION - LOCATION: LOCATION: HIP

## 2024-05-05 NOTE — PROGRESS NOTES
In-Patient Progress Note    Patient:  Breezy Hernández 89 y.o. female MRN: [id number]     Date of Service: 5/5/2024    Hospital Day: 2      Chief complaint: had no chief complaint listed for this encounter.      Assessment and Plan   Breezy Hernández, a 89 y.o. female, was admitted on 5/4/2024 with complaints of had no chief complaint listed for this encounter.    Assessment and plan    Left femoral neck fracture-x-ray of left hip shows acute fracture of the base of the left femoral neck with displacement.  Appears to have been a mechanical fall.  Orthopedic surgery following.  Patient underwent intramedullary nail abe insertion for the left femoral neck fracture.  A-fib with RVR-likely due to combination of pain from hip fracture and patient not receiving her home medications today.  Restart home meds of diltiazem and metoprolol.  Cardizem drip was initiated that heart rate control-off of drip.  Eliquis was held for surgery and was given a dose of Lovenox 1 Mg per KG on the day of admission.  Restart Eliquis from tomorrow.  CKD 3-creatinine 1.3.  This appears to be around patient's baseline.  Chronic systolic/diastolic CHF-3-week surgery started IV fluids.  Will decrease rate to try and avoid fluid overload.  Hold Lasix until after surgery.              Diet ADULT DIET; Regular   DVT Prophylaxis [] Lovenox, []  Heparin, [] SCDs, [] Ambulation,  [] Eliquis, [] Xarelto  [] Coumadin   Peptic ulcer prophylaxis -   Code Status Full Code   Disposition From / Current living situation : home  Expected Disposition: home  Estimated Date of Discharge: TBD  Patient requires continued admission due to left femoral neck fracture, A-fib RVR, pending PT/OT assessment   Surrogate Decision Maker/ SHABNAM Haley (katherine)       Personally reviewed Lab Studies and Imaging         Subjective:     Chief Complaint: No chief complaint on file.       Breezy Hernández is a 89 y.o. female who presents after a fall and having left femoral   Left eye: No discharge.      Conjunctiva/sclera: Conjunctivae normal.      Pupils: Pupils are equal, round, and reactive to light.   Neck:      Thyroid: No thyromegaly.      Vascular: No JVD.      Trachea: No tracheal deviation.   Cardiovascular:      Rate and Rhythm: Normal rate and regular rhythm.      Heart sounds: Normal heart sounds. No murmur heard.     No friction rub. No gallop.   Pulmonary:      Effort: Pulmonary effort is normal. No respiratory distress.      Breath sounds: Normal breath sounds. No stridor. No wheezing or rales.   Chest:      Chest wall: No tenderness.   Abdominal:      General: Bowel sounds are normal. There is no distension.      Palpations: Abdomen is soft. There is no mass.      Tenderness: There is no abdominal tenderness. There is no guarding or rebound.      Hernia: No hernia is present.   Genitourinary:     Vagina: Normal.   Musculoskeletal:         General: No tenderness or deformity. Normal range of motion.      Cervical back: Normal range of motion and neck supple.   Lymphadenopathy:      Cervical: No cervical adenopathy.   Skin:     General: Skin is warm and dry.      Capillary Refill: Capillary refill takes less than 2 seconds.      Coloration: Skin is not pale.      Findings: No erythema or rash.   Neurological:      Mental Status: She is alert and oriented to person, place, and time.      Cranial Nerves: No cranial nerve deficit.      Motor: No abnormal muscle tone.      Coordination: Coordination normal.      Deep Tendon Reflexes: Reflexes normal.   Psychiatric:         Behavior: Behavior normal.         Thought Content: Thought content normal.         Judgment: Judgment normal.           Current Medications      sodium chloride flush  5-40 mL IntraVENous 2 times per day    ceFAZolin (ANCEF) IVPB  1,000 mg IntraVENous Q8H    enoxaparin  40 mg SubCUTAneous Daily    dilTIAZem  240 mg Oral Daily    levothyroxine  75 mcg Oral Daily    metoprolol tartrate  12.5 mg Oral BID

## 2024-05-05 NOTE — PROGRESS NOTES
Pt hr 130-170's and bp 143/113 recheck bp 146/120- Dr Andrews notified of changes, pt more alert and giving oral medication. While trying to give oral meds pt was having hard time getting them down- Dr Andrews aware and new orders noted.

## 2024-05-05 NOTE — OP NOTE
Operative Note      Patient: Breezy Hernández  YOB: 1931  MRN: [id number]    Date of Procedure: 5/5/2024    Pre-Op Diagnosis Codes:     * Closed fracture of left hip with routine healing, subsequent encounter [S72.002D]    Post-Op Diagnosis: Same       Procedure(s):  HIP INTRAMEDULLARY NAIL BK INSERTION - Left    Surgeon(s):  Francisco Montilla DO    Assistant:   First Assistant: John Haywood    Anesthesia: General    Estimated Blood Loss (mL): less than 50     Complications: None    Specimens:   * No specimens in log *    Implants:  Implant Name Type Inv. Item Serial No.  Lot No. LRB No. Used Action   NAIL CPHLMDLLRY L215MM D10MM 130DG SHRT LEFT CNTR CLMN DPHSL - [id number]  NAIL CPHLMDLLRY L215MM D10MM 130DG SHRT LEFT CNTR CLMN DPHSL  [employer]- [id number] Left 1 Implanted   SCREW IM L80MM [id number] TI ALLY LAG RJ NAIL - [id number]  SCREW IM L80MM [id number] TI ALLY LAG RJ NAIL  [employer]- [id number] Left 1 Implanted   SCREW BONE L275MM D5MM HEAD D35MM CRTCL TTNM NNCNNLTD NNLCKN - [id number]  SCREW BONE L275MM D5MM HEAD D35MM CRTCL TTNM NNCNNLTD NNLCKN  [employer]- [id number] Left 1 Implanted   SCREW BONE L275MM D5MM HEAD D35MM CRTCL TTNM NNCNNLTD NNLCKN - [id number]  SCREW BONE L275MM D5MM HEAD D35MM CRTCL TTNM NNCNNLTD NNLCKN  [employer]- [id number] Left 1 Implanted   PIN FIX [id number] THRD FOR CEPHALOMEDULLARY SM RJ NAIL SYS - [id number]  PIN FIX [id number] THRD FOR CEPHALOMEDULLARY SM RJ NAIL SYS  [employer]-  Left 4 Implanted         Drains: * No LDAs found *    Findings:  Infection Present At Time Of Surgery (PATOS) (choose all levels that have infection present):  No infection present  Other Findings: Please see dictated op report    Detailed Description of Procedure:   PROCEDURES PERFORMED:  1.  Closed reduction and intramedullary nail fixation of the left  intertrochanteric hip fracture using [employer] short 10 mm 130

## 2024-05-05 NOTE — PROGRESS NOTES
Pt return from PACU at this time. Pt resting quietly, hr 100-130's, bp 94/73 (81) Dr Andrews made aware- pt lethargic and unable to take oral meds at this time.

## 2024-05-05 NOTE — PROGRESS NOTES
4 Eyes Skin Assessment     NAME:  Breezy Hernández  YOB: 1931  MEDICAL RECORD NUMBER:  [id number]    The patient is being assessed for  Transfer to [facility]- 4-N    I agree that at least one RN has performed a thorough Head to Toe Skin Assessment on the patient. ALL assessment sites listed below have been assessed.      Areas assessed by both nurses:    Head, Face, Ears, Shoulders, Back, Chest, Arms, Elbows, Hands, Sacrum. Buttock, Coccyx, Ischium, Legs. Feet and Heels, and Under Medical Devices         Does the Patient have a Wound? No noted wound(s)       Lamont Prevention initiated by RN: Yes  Wound Care Orders initiated by RN: No    Pressure Injury (Stage 3,4, Unstageable, DTI, NWPT, and Complex wounds) if present, place Wound referral order by RN under : No    New Ostomies, if present place, Ostomy referral order under : No     Nurse 1 eSignature: Electronically signed by Jesenia Kirby RN on 5/5/24 at 10:54 AM EDT    **SHARE this note so that the co-signing nurse can place an eSignature**    Nurse 2 eSignature: Electronically signed by Nancy Valle RN on 5/5/24 at 11:55 AM EDTElectronically signed by Nancy Valle RN on 5/5/24 at 11:55 AM EDT

## 2024-05-05 NOTE — PROGRESS NOTES
Dr Montilla notified of pt dressings having draining/bleeding- with 1 being fully saturated but not leaking out as of yet. Per Dr Montilla re-enforce with abd pads

## 2024-05-05 NOTE — PROGRESS NOTES
CARDIOLOGY PROGRESS NOTE                                                  Name:  Breezy Hernández /Age/Sex: 3/26/1931  (89 y.o. female)   MRN & CSN:  [id number] & [id number] Admission Date/Time: 2024  7:09 AM   Location:  [facility]3/3123- PCP: Sharmila Driscoll APRN - NP         Admit Date:  2024  Hospital Day: 2      SUBJECTIVE:     Seen patient as follow up as consultation for  Atrial fibrillation    Patient is postop and somnolent.      TELEMETRY: A-fib RVR, currently in rate controlled.        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    Discontinue oral anticoagulation as patient is high risk for falls at an advanced age.  Recommend low-dose aspirin and Plavix if okay with surgery.  Consider for Watchman procedure in outpatient setting    Risk factor modification         Past medical history:    has a past medical history of A-fib (HCC), Arthritis, and Thyroid disease.  Past surgical history:   has a past surgical history that includes Tonsillectomy; Appendectomy; Cholecystectomy; Hysterectomy; eye surgery; and fracture surgery.  Social History:   reports that she has never smoked. She has never used smokeless tobacco. She reports that she does not drink alcohol and does not use drugs.  Family history:  family history

## 2024-05-05 NOTE — PROGRESS NOTES
Orthopaedic Consult    Patient Name: Breezy Hernández   (3/26/1931)  MRN   [id number]   Today's date:  5/5/2024    CHIEF COMPLAINT:   Left hip pain      Updated HPI: Patient seen and examined resting comfortably bed.  Remains confused.  No questions about today's planned procedure.  No issues overnight per nursing staff.    HISTORY OF PRESENT ILLNESS:      The patient is a 89 y.o. female  who presented to the ER after a fall.      This was a fall from standing height which occurred at home when the patient tripped over the carpet in her bathroom earlier this morning when attempting to use the bathroom.    After the fall the patient was unable to ambulate and the pain was sudden, sharp and rated 10/10.  X-rays were done in the ER showing a displaced basicervical left femoral neck fracture.  Patient was subsequently admitted by the hospitalist to undergo preoperative medical management for planned operative intervention.    Patient does not communicate well due to being hard of hearing and having cognitive dysfunction.  I was able to discuss the patient directly with her niece who is the POA.    The patient denied any chest pain, shortness of breath or syncopal episode prior to the fall.    Prior to the fall, the patient ambulated with a walker    Current anticoagulation: Eliquis, currently held    Patient denies prior left hip pain  Patient denies prior left hip surgery      Past Medical History         Diagnosis Date    A-fib (HCC)     Arthritis     Thyroid disease        Past Surgical History         Procedure Laterality Date    APPENDECTOMY      CHOLECYSTECTOMY      EYE SURGERY      FRACTURE SURGERY      HYSTERECTOMY (CERVIX STATUS UNKNOWN)      TONSILLECTOMY         Medications Prior to Admission:     Prior to Admission medications    Medication Sig Start Date End Date Taking? Authorizing Provider   dilTIAZem (DILACOR XR) 240 MG extended release capsule Take 1 capsule by mouth daily 3/22/24   Adam Andrews MD  identified. Femoral heads located within acetabulum. Sacral ala place.     IMPRESSION:  1. 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  2. Comminuted intratrochanteric fracture of the proximal left femur. Femoral head located within acetabulum.  3. Sigmoid diverticulosis        PREVIOUS IMAGING:   3 views of a left hip demonstrate:  TECHNIQUE: AP view of the pelvis and additional AP and lateral views of the left hip are obtained.     FINDINGS: There is an acute fracture of the base of the neck of the left femur, with mild displacement. The femoral shaft is displaced superiorly.     The femoral head remains located within the acetabulum. The bony pelvis is intact.     Skin staples project over the anterior lower abdominal and pelvic midline.     IMPRESSION:     Acute fracture of the base of the left femoral neck with displacement.      Assessment and Plan     1.  Left displaced basicervical femoral neck fracture    The patient has been admitted to the hospitalist service for pre-operative and post-operative medical management.    Based on the fracture pattern I recommend hip cephalomedullary nailing. The goal of surgical intervention is pain control, fracture stabilization and mobility.  Postoperatively the patient will remain in the hospital for pain control, physical therapy, and medical optimization.    Surgical risks and benefits were explained to the patient as well as the family; these included but not limited to infection, hardware complications, dislocation, further displacement and rotation due to the basicervical nature of the fracture, avascular necrosis, nonunion, need for further surgery, DVT, medical complications, and continued pain and difficulty ambulating after surgical intervention.  I also explained the expected amount of blood loss during the surgery as well as risk of neurovascular damage and death.    I did have a conversation with the patient and

## 2024-05-05 NOTE — PROGRESS NOTES
1039: Patient arrived in PACU from OR s/p left hip nail insertion. Received report from Mitra BARRETT and Demi EMMANUEL.  1117: Notified anesthesia about patient being in a-fib rvr. Patient does have current history of a-fib rvr and was on cardizem prior to surgery.   1120: Dr. Asencio bedside.   1136: Called report to nurse Ba.   1143: Spoke with patients family member Vendal on the phone, gave update on patient.   1155: Patient awake and back to baseline mentation. 98% on room air. Left hip dressing clean, dry and intact, no bleeding.

## 2024-05-05 NOTE — PROGRESS NOTES
Dr Horvath notified of pt seeming to have difficulty swallowing- taking forever to swallow food and when gave oral meds she would not swallow them.

## 2024-05-06 LAB
HCT VFR BLD CALC: 27.5 % (ref 37–47)
HCT VFR BLD CALC: 29.2 % (ref 37–47)
HEMOGLOBIN: 8.3 GM/DL (ref 12.5–16)
HEMOGLOBIN: 9.1 GM/DL (ref 12.5–16)

## 2024-05-06 PROCEDURE — 92610 EVALUATE SWALLOWING FUNCTION: CPT

## 2024-05-06 PROCEDURE — 99232 SBSQ HOSP IP/OBS MODERATE 35: CPT | Performed by: INTERNAL MEDICINE

## 2024-05-06 PROCEDURE — 97162 PT EVAL MOD COMPLEX 30 MIN: CPT

## 2024-05-06 PROCEDURE — 97112 NEUROMUSCULAR REEDUCATION: CPT

## 2024-05-06 PROCEDURE — 97535 SELF CARE MNGMENT TRAINING: CPT

## 2024-05-06 PROCEDURE — 2700000000 HC OXYGEN THERAPY PER DAY

## 2024-05-06 PROCEDURE — 85018 HEMOGLOBIN: CPT

## 2024-05-06 PROCEDURE — 85014 HEMATOCRIT: CPT

## 2024-05-06 PROCEDURE — 6370000000 HC RX 637 (ALT 250 FOR IP): Performed by: STUDENT IN AN ORGANIZED HEALTH CARE EDUCATION/TRAINING PROGRAM

## 2024-05-06 PROCEDURE — 6360000002 HC RX W HCPCS: Performed by: STUDENT IN AN ORGANIZED HEALTH CARE EDUCATION/TRAINING PROGRAM

## 2024-05-06 PROCEDURE — 36415 COLL VENOUS BLD VENIPUNCTURE: CPT

## 2024-05-06 PROCEDURE — 2140000000 HC CCU INTERMEDIATE R&B

## 2024-05-06 PROCEDURE — APPNB180 APP NON BILLABLE TIME > 60 MINS: Performed by: NURSE PRACTITIONER

## 2024-05-06 PROCEDURE — 97166 OT EVAL MOD COMPLEX 45 MIN: CPT

## 2024-05-06 PROCEDURE — 97530 THERAPEUTIC ACTIVITIES: CPT

## 2024-05-06 PROCEDURE — 6370000000 HC RX 637 (ALT 250 FOR IP): Performed by: INTERNAL MEDICINE

## 2024-05-06 PROCEDURE — 94761 N-INVAS EAR/PLS OXIMETRY MLT: CPT

## 2024-05-06 PROCEDURE — 2580000003 HC RX 258: Performed by: STUDENT IN AN ORGANIZED HEALTH CARE EDUCATION/TRAINING PROGRAM

## 2024-05-06 RX ORDER — ASPIRIN 81 MG/1
81 TABLET, CHEWABLE ORAL DAILY
Status: DISCONTINUED | OUTPATIENT
Start: 2024-05-06 | End: 2024-05-09 | Stop reason: HOSPADM

## 2024-05-06 RX ORDER — CLOPIDOGREL BISULFATE 75 MG/1
75 TABLET ORAL DAILY
Status: DISCONTINUED | OUTPATIENT
Start: 2024-05-06 | End: 2024-05-09 | Stop reason: HOSPADM

## 2024-05-06 RX ADMIN — METOPROLOL TARTRATE 12.5 MG: 25 TABLET, FILM COATED ORAL at 21:20

## 2024-05-06 RX ADMIN — OXYCODONE HYDROCHLORIDE 5 MG: 5 TABLET ORAL at 09:33

## 2024-05-06 RX ADMIN — HYDROXYZINE HYDROCHLORIDE 10 MG: 10 TABLET ORAL at 21:20

## 2024-05-06 RX ADMIN — METOPROLOL TARTRATE 12.5 MG: 25 TABLET, FILM COATED ORAL at 12:30

## 2024-05-06 RX ADMIN — SODIUM CHLORIDE, PRESERVATIVE FREE 10 ML: 5 INJECTION INTRAVENOUS at 09:34

## 2024-05-06 RX ADMIN — CLOPIDOGREL BISULFATE 75 MG: 75 TABLET ORAL at 16:46

## 2024-05-06 RX ADMIN — HEPARIN SODIUM 5000 UNITS: 5000 INJECTION INTRAVENOUS; SUBCUTANEOUS at 21:21

## 2024-05-06 RX ADMIN — LEVOTHYROXINE SODIUM 75 MCG: 0.07 TABLET ORAL at 05:56

## 2024-05-06 RX ADMIN — ASPIRIN 81 MG: 81 TABLET, CHEWABLE ORAL at 16:47

## 2024-05-06 RX ADMIN — HYDROXYZINE HYDROCHLORIDE 10 MG: 10 TABLET ORAL at 12:01

## 2024-05-06 RX ADMIN — WATER 1000 MG: 1 INJECTION INTRAMUSCULAR; INTRAVENOUS; SUBCUTANEOUS at 09:34

## 2024-05-06 RX ADMIN — PANTOPRAZOLE SODIUM 40 MG: 40 TABLET, DELAYED RELEASE ORAL at 05:56

## 2024-05-06 RX ADMIN — DILTIAZEM HYDROCHLORIDE 240 MG: 240 CAPSULE, EXTENDED RELEASE ORAL at 09:33

## 2024-05-06 RX ADMIN — HYDROXYZINE HYDROCHLORIDE 10 MG: 10 TABLET ORAL at 09:33

## 2024-05-06 RX ADMIN — SODIUM CHLORIDE, PRESERVATIVE FREE 10 ML: 5 INJECTION INTRAVENOUS at 21:21

## 2024-05-06 RX ADMIN — SODIUM CHLORIDE, PRESERVATIVE FREE 5 ML: 5 INJECTION INTRAVENOUS at 21:29

## 2024-05-06 RX ADMIN — WATER 1000 MG: 1 INJECTION INTRAMUSCULAR; INTRAVENOUS; SUBCUTANEOUS at 02:48

## 2024-05-06 RX ADMIN — HEPARIN SODIUM 5000 UNITS: 5000 INJECTION INTRAVENOUS; SUBCUTANEOUS at 09:33

## 2024-05-06 ASSESSMENT — PAIN DESCRIPTION - DESCRIPTORS
DESCRIPTORS: ACHING
DESCRIPTORS: ACHING

## 2024-05-06 ASSESSMENT — PAIN DESCRIPTION - LOCATION
LOCATION: HIP
LOCATION: HIP

## 2024-05-06 ASSESSMENT — PAIN SCALES - GENERAL
PAINLEVEL_OUTOF10: 9
PAINLEVEL_OUTOF10: 2
PAINLEVEL_OUTOF10: 1

## 2024-05-06 ASSESSMENT — PAIN DESCRIPTION - ORIENTATION
ORIENTATION: LEFT
ORIENTATION: LEFT

## 2024-05-06 NOTE — PROGRESS NOTES
Breezy Hernández (3/26/1931)    Daily Progress Note-  Francisco Montilla DO                   Today's Date:   5/6/2024          Subjective:   Patient seen and examined resting comfortably in bed  Doing well postoperatively.    Pain controlled  No issues overnight per nursing staff  Per the notes, it does not appear that the patient is work with therapy or been weightbearing yet.  Again, she does not communicate very well with me    Objective:    Patient Vitals for the past 4 hrs:   BP Temp Temp src Pulse Resp SpO2   05/06/24 0400 119/66 97.5 °F (36.4 °C) Oral 83 17 99 %        Vitals:    05/06/24 0400   BP: 119/66   Pulse: 83   Resp: 17   Temp: 97.5 °F (36.4 °C)   SpO2: 99%        Physical Exam:     The patient is  awake and confused, at baseline  Resting comfortably in bed    Operative extremity:    The dressing is mildly saturated with blood but intact  Sensation and motor function intact distally  Leg lengths equal and appropriately aligned  Minimally tender over incision sites with no hematoma    No additional areas of tenderness or pain in the bilateral upper extremities or contralateral lower extremity.  Neurovascularly intact throughout bilateral upper extremities and contralateral lower extremity    Brisk capillary refill and negative Homans bilaterally.  Compartments are soft and compressible      LABS   CBC:   Recent Labs     05/04/24  0355 05/05/24  0148 05/06/24  0535   WBC 11.8* 10.4  --    HGB 14.6 12.4* 9.1*    188  --        IMAGING   No new orthopedic imaging    Assessment and Plan     1.  POD # 1  Hip left cephalomedullary nailing    1:  Physical, Occupational therapy consult for mobilization   -Toe-touch weightbearing    -No additional precautions  2:  DVT prophylaxis   -Switch from Lovenox to heparin after discussion with the pharmacist  3:  Continue Pain Control   -wean to PO  meds  4.  Medical management per hospitalist service  5.  D/C Planning:     -Per case management  6.  No additional orthopedic intervention at this time.         Francisco Montilla, DO

## 2024-05-06 NOTE — PROGRESS NOTES
Occupational Therapy  [facility] ACUTE CARE OCCUPATIONAL THERAPY EVALUATION  Breezy Hernández, 3/26/1931, 3123/3123-A, 5/6/2024    Discharge Recommendation: Encourage facility for post-acute rehabilitation, anticipate 1-2 hours per day and 5 days per week.    History  [place]:  There were no encounter diagnoses.  Patient  has a past medical history of A-fib (HCC), Arthritis, and Thyroid disease.  Patient  has a past surgical history that includes Tonsillectomy; Appendectomy; Cholecystectomy; Hysterectomy; eye surgery; fracture surgery; and hip surgery (Left, 5/5/2024).    Subjective:  Patient states:  \"Can I ask you a question? I don't know what to ask\"   Pain:  indicates pain in L LE with movement, but reports no pain at rest.    Communication: RN, CM, co-eval with PT Leigh for safety   Restrictions: TTWB L LE, general precautions, fall risk     Home Setup/Prior level of function   Lives With: Other (comment) (Niece)  Home Layout: One level  Home Access: Stairs to enter with rails  Entrance Stairs - Number of Steps: 3 steps to enter with unilateral railing  Bathroom Shower/Tub: Tub/Shower unit, Shower chair with back  Bathroom Toilet:  (comfort height, no grab bars)  Bathroom Equipment: Grab bars in shower  Home Equipment: Grab bars, Walker, rolling, Walker, 4 wheeled  Receives Help From: Family  ADL Assistance: Needs assistance  Bath: Maximum assistance  Dressing: Maximum assistance (Min A UB, Max A LB)  Grooming: Stand by assistance (after set up seated)  Feeding: Modified independent   Toileting: Needs assistance (assist for hygiene if incontinent, otherwise CGA)  Homemaking Responsibilities: No (Niece completes)  Ambulation Assistance: Independent (to bathroom)  Active : No  Mode of Transportation: Car  Occupation: Retired  Type of Occupation:   IADL Comments: Niece completes  **taken from prior evaluation 2/2024 as pt is poor historian this date    Examination of body systems (includes

## 2024-05-06 NOTE — PROGRESS NOTES
SLP ALL NOTES  Facility/Department: [address]   CLINICAL BEDSIDE SWALLOW EVALUATION    NAME: Breezy Hernández  : 3/26/1931  MRN: [id number]    IMPRESSIONS: Breezy Hernández was referred for a bedside swallow evaluation d/t prolonged oral manipulation/mastication vs oral holding, reported by nsg. Pt was admitted 2024 d/t lt hip fx, 2/2 mechanical fall. Relevant medical hx includes: dementia, CHF, anxiety, hypothyroidism. No hx of dysphagia PTA.     Pt was evaluated seated upright in bed, pleasant and coop. Pt required verbal cues and encouragement for PO intake, often stating \"you go ahead and eat that\" when offered trials. Minimal ability to participate in oral mechanism examination, SLP observed informally throughout evaluation and judged to be grossly WFL. Pt was seen with breakfast tray, accepting PO trials of thin liquids via cup/straw, puree consistency and regular solids. Oral stage was WFL with adequate labial seal, oral manipulation/mastication, AP transit and oral clearance. Pharyngeal stage appears WFL with no s/s of aspiration across consistencies.     Recommend cont regular solids with thin liquids, with aspiration precautions and assistance feeding. Meds crushed in puree as able. Results/recommendations d/w pt and RN, RN verbalized understanding and agreement. No further inpatient needs ID at this time.     ADMISSION DATE: 2024  ADMITTING DIAGNOSIS: has AMS (altered mental status); Physical debility; Atrial fibrillation (HCC); Atrial fibrillation with RVR (HCC); Fracture; and Displaced fracture of left femoral neck (HCC) on their problem list.  ONSET DATE: this admission    Date of Eval: 2024  Evaluating Therapist: Dilia Dunn MS, CCC-SLP    Current Diet level:  Current Diet : Regular  Current Liquid Diet : Thin    Primary Complaint  None    Pain:  Pain Assessment  Pain Assessment: 0-10  Pain Level: 9  Patient's Stated Pain Goal: 0 - No pain  Pain Location: Hip  Pain Orientation:  Left  Pain Descriptors: Aching  Functional Pain Assessment: Prevents or interferes some active activities and ADLs  Non-Pharmaceutical Pain Intervention(s): Emotional support  Response to Pain Intervention: Patient satisfied  Side Effects: No reported side effects  Faces, Legs, Activity, Cry, and Consolability (FLACC)  Face (F): occasional grimace or frown, withdrawn, disinterested  Legs (L): normal position or relaxed  Activity (A): lying quietly, normal position, moves easily  Cry (C): moans or whimpers, occasional complaint  Consolability (C): content, relaxed  FLACC Score : 2    Reason for Referral  Breezy Hernández was referred for a bedside swallow evaluation to assess the efficiency of her swallow function, identify signs and symptoms of aspiration and make recommendations regarding safe dietary consistencies, effective compensatory strategies, and safe eating environment.    Impression  Dysphagia Diagnosis: Swallow function appears [facility]  Dysphagia Outcome Severity Scale: Level 6: Within functional limits/Modified independence     Treatment Plan  Requires SLP Intervention: No  Duration of Treatment: N/A        Recommended Diet and Intervention  Diet Solids Recommendation: Regular  Liquid Consistency Recommendation: Thin  Recommended Form of Meds: Crushed in puree as able  Recommendations: Assistance with meals       Compensatory Swallowing Strategies  Compensatory Swallowing Strategies : Upright as possible for all oral intake;Small bites/sips    Treatment/Goals  Short-term Goals  Timeframe for Short-term Goals: N/A  Long-term Goals  Timeframe for Long-term Goals: N/A    General  Chart Reviewed: Yes  Behavior/Cognition: Alert;Pleasant mood;Confused;Requires cueing  Respiratory Status: O2 via nasual cannula  O2 Device: Nasal cannula  Communication Observation: Functional  Follows Directions: Simple (given max cues)  Dentition: Adequate  Patient Positioning: Upright in bed  Baseline Vocal Quality: Normal  Prior

## 2024-05-06 NOTE — PROGRESS NOTES
In-Patient Progress Note    Patient:  Breezy Hernández 89 y.o. female MRN: [id number]     Date of Service: 5/6/2024    Hospital Day: 3      Chief complaint: had no chief complaint listed for this encounter.      Assessment and Plan   Breezy Hernández, a 89 y.o. female, was admitted on 5/4/2024 with complaints of had no chief complaint listed for this encounter.    Assessment and plan    Left femoral neck fracture-x-ray of left hip shows acute fracture of the base of the left femoral neck with displacement.  Appears to have been a mechanical fall.  Orthopedic surgery following.  Patient underwent intramedullary nail abe insertion for the left femoral neck fracture.  A-fib with RVR-likely due to combination of pain from hip fracture and patient not receiving her home medications today.  Restart home meds of diltiazem and metoprolol.  Cardizem drip was initiated that heart rate control-off of drip. HR controlled on metoprolol 12.5 mg twice daily. Eliquis was held for surgery and was given a dose of Lovenox 1 Mg per KG on the day of admission.  Restarted eliquis but patient is a high fall risk - switched to aspirin and plavix. DVT prophylaxis dose of heparin.  CKD 3-creatinine 1.3.  This appears to be around patient's baseline.  Chronic systolic/diastolic CHF-3-week surgery started IV fluids.  Will decrease rate to try and avoid fluid overload.  Hold Lasix until after surgery.              Diet ADULT DIET; Regular   DVT Prophylaxis [] Lovenox, []  Heparin, [] SCDs, [] Ambulation,  [] Eliquis, [] Xarelto  [] Coumadin   Peptic ulcer prophylaxis -   Code Status Full Code   Disposition From / Current living situation : home  Expected Disposition: home  Estimated Date of Discharge: TBD  Patient requires continued admission due to left femoral neck fracture, A-fib RVR, pending PT/OT assessment   Surrogate Decision Maker/ SHABNAM Haley (katherine)       Personally reviewed Lab Studies and Imaging         Subjective:     Chief

## 2024-05-06 NOTE — CONSULTS
[facility] ACUTE CARE PHYSICAL THERAPY EVALUATION  Brezey Hernández, 3/26/1931, 3123/3123-A, 5/6/2024    History  [place]:  There were no encounter diagnoses.  Patient  has a past medical history of A-fib (HCC), Arthritis, and Thyroid disease.  Patient  has a past surgical history that includes Tonsillectomy; Appendectomy; Cholecystectomy; Hysterectomy; eye surgery; fracture surgery; and hip surgery (Left, 5/5/2024).    Discharge Recommendation: Encourage facility for moderate post-acute rehabilitation, anticipate 1-2 hours per day and 5 days per week.     Equipment: TBD at next level of care    Subjective:    Patient states:  \"I want to get out of here and go home!\"      Pain:  denies pain.      Communication with other providers:  Handoff to RN, OT    Restrictions: TTWB L LE, general precautions, fall risk    Home Setup/Prior level of function   Lives With: Other (comment) (Niece)  Home Layout: One level  Home Access: Stairs to enter with rails  Entrance Stairs - Number of Steps: 3 steps to enter with unilateral railing  Bathroom Shower/Tub: Tub/Shower unit, Shower chair with back  Bathroom Toilet:  (comfort height, no grab bars)  Bathroom Equipment: Grab bars in shower  Home Equipment: Grab bars, Walker, rolling, Walker, 4 wheeled  Receives Help From: Family  ADL Assistance: Needs assistance  Bath: Maximum assistance  Dressing: Maximum assistance (Min A UB, Max A LB)  Grooming: Stand by assistance (after set up seated)  Feeding: Modified independent   Toileting: Needs assistance (assist for hygiene if incontinent, otherwise CGA)  Homemaking Responsibilities: No (Niece completes)  Ambulation Assistance: Independent (to bathroom)  Active : No  Mode of Transportation: Car  Occupation: Retired  Type of Occupation:   IADL Comments: Niece completes  **taken from prior evaluation 2/2024 as pt is poor historian this date    Examination of body systems (includes body structures/functions,

## 2024-05-06 NOTE — PROGRESS NOTES
CARDIOLOGY PROGRESS NOTE                                                  Name:  Breezy Hernández /Age/Sex: 3/26/1931  (89 y.o. female)   MRN & CSN:  [id number] & [id number] Admission Date/Time: 2024  7:09 AM   Location:  [facility]3/3123 PCP: Sharmila Driscoll APRN - NP         Admit Date:  2024  Hospital Day: 3      SUBJECTIVE:     Seen patient as follow up as consultation for  Atrial fibrillation    Patient is is alert and hoping to go home      TELEMETRY: A-fib RVR, currently in rate controlled.         CARDIOLOGY ATTENDING ADDENDUM    I have seen, spoken to and examined this patient personally, independent of the NP/PAC. I have reviewed the hospital care given to date and reviewed all pertinent labs and imaging. I have spoken with patient, nursing staff and provided written and verbal instructions .The above note has been reviewed. I have spent substantive (>50%) amount of time in formulating patient care.               Physical Exam:       Head: normal  Eye: normal  Chest:    Clear,   0 Basilar crackles   Cardiovascular:  S1S2  IR         MEDICAL DECISION MAKING :        S/p mechanical fall with left femoral neck fracture  S/p ORIF 2024  Persistent atrial fibrillation now rate controlled  Post op atrial fibrillation- remains in atrial fibrillation   Rate control only due to fall risk.   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 SIOBHAN Andrews MD      +++++++++++++++++++++++++++++++++++++++       Electronically signed by ALEJANDRO Salguero CNP on 2024 at 4:51 PM

## 2024-05-06 NOTE — PLAN OF CARE
Problem: Discharge Planning  Goal: Discharge to home or other facility with appropriate resources  5/5/2024 2014 by Yolette Benitez RN  Outcome: Progressing  5/5/2024 1754 by Jesenia Kirby RN  Outcome: Progressing  Flowsheets (Taken 5/5/2024 1039 by Tangela Sen RN)  Discharge to home or other facility with appropriate resources:   Identify barriers to discharge with patient and caregiver   Arrange for needed discharge resources and transportation as appropriate     Problem: Pain  Goal: Verbalizes/displays adequate comfort level or baseline comfort level  5/5/2024 2014 by Yolette Benitez RN  Outcome: Progressing  5/5/2024 1754 by Jesenia Kirby RN  Outcome: Progressing  Flowsheets  Taken 5/5/2024 1200 by Tangela Sen RN  Verbalizes/displays adequate comfort level or baseline comfort level:   Encourage patient to monitor pain and request assistance   Assess pain using appropriate pain scale  Taken 5/5/2024 1115 by Tangela Sen RN  Verbalizes/displays adequate comfort level or baseline comfort level: Encourage patient to monitor pain and request assistance  Taken 5/5/2024 1052 by Tangela Sen RN  Verbalizes/displays adequate comfort level or baseline comfort level: Encourage patient to monitor pain and request assistance  Taken 5/5/2024 1039 by Tangela Sen RN  Verbalizes/displays adequate comfort level or baseline comfort level: Encourage patient to monitor pain and request assistance     Problem: Skin/Tissue Integrity  Goal: Absence of new skin breakdown  Description: 1.  Monitor for areas of redness and/or skin breakdown  2.  Assess vascular access sites hourly  3.  Every 4-6 hours minimum:  Change oxygen saturation probe site  4.  Every 4-6 hours:  If on nasal continuous positive airway pressure, respiratory therapy assess nares and determine need for appliance change or resting period.  5/5/2024 2014 by Yolette Benitez RN  Outcome: Progressing  5/5/2024 1754 by Jesenia Kirby RN  Outcome:

## 2024-05-06 NOTE — CARE COORDINATION
05/06/24 1542   Service Assessment   Patient Orientation Other (see comment)  (dementia)   Cognition Dementia / Early Alzheimer's   History Provided By Child/Family   Primary Caregiver Family   Support Systems Family Members   Patient's Healthcare Decision Maker is: Named in Scanned ACP Document   PCP Verified by CM Yes   Prior Functional Level Assistance with the following:;Mobility   Current Functional Level Assistance with the following:   Can patient return to prior living arrangement Unknown at present   Ability to make needs known: Poor   Family able to assist with home care needs: Yes   Would you like for me to discuss the discharge plan with any other family members/significant others, and if so, who? Yes  (niece)   Financial Resources Medicare   Community Resources ECF/Home Care     CM in to see Pt to initiate discharge planning.  Pt from home with her niece.  Pt niece Vendal present.     Pt uses a walker at baseline.  Vendal is in agreement with therapy recommendations.  First choice [facility], second [facility].     PS to Dr. Benites with referral  CM call to [facility] with referral.     CM following

## 2024-05-07 LAB
HCT VFR BLD CALC: 25.4 % (ref 37–47)
HEMOGLOBIN: 7.7 GM/DL (ref 12.5–16)

## 2024-05-07 PROCEDURE — 6370000000 HC RX 637 (ALT 250 FOR IP): Performed by: STUDENT IN AN ORGANIZED HEALTH CARE EDUCATION/TRAINING PROGRAM

## 2024-05-07 PROCEDURE — 97530 THERAPEUTIC ACTIVITIES: CPT

## 2024-05-07 PROCEDURE — 6360000002 HC RX W HCPCS: Performed by: STUDENT IN AN ORGANIZED HEALTH CARE EDUCATION/TRAINING PROGRAM

## 2024-05-07 PROCEDURE — 6370000000 HC RX 637 (ALT 250 FOR IP): Performed by: INTERNAL MEDICINE

## 2024-05-07 PROCEDURE — 85014 HEMATOCRIT: CPT

## 2024-05-07 PROCEDURE — 97110 THERAPEUTIC EXERCISES: CPT

## 2024-05-07 PROCEDURE — 94761 N-INVAS EAR/PLS OXIMETRY MLT: CPT

## 2024-05-07 PROCEDURE — 2140000000 HC CCU INTERMEDIATE R&B

## 2024-05-07 PROCEDURE — 94150 VITAL CAPACITY TEST: CPT

## 2024-05-07 PROCEDURE — 36415 COLL VENOUS BLD VENIPUNCTURE: CPT

## 2024-05-07 PROCEDURE — 2580000003 HC RX 258: Performed by: STUDENT IN AN ORGANIZED HEALTH CARE EDUCATION/TRAINING PROGRAM

## 2024-05-07 PROCEDURE — 85018 HEMOGLOBIN: CPT

## 2024-05-07 RX ORDER — BISACODYL 10 MG
10 SUPPOSITORY, RECTAL RECTAL ONCE
Status: COMPLETED | OUTPATIENT
Start: 2024-05-07 | End: 2024-05-07

## 2024-05-07 RX ORDER — FUROSEMIDE 20 MG/1
20 TABLET ORAL EVERY OTHER DAY
Qty: 45 TABLET | Refills: 0 | Status: ON HOLD | OUTPATIENT
Start: 2024-05-07 | End: 2024-08-05

## 2024-05-07 RX ADMIN — SODIUM CHLORIDE, PRESERVATIVE FREE 10 ML: 5 INJECTION INTRAVENOUS at 20:14

## 2024-05-07 RX ADMIN — BISACODYL 10 MG: 10 SUPPOSITORY RECTAL at 17:28

## 2024-05-07 RX ADMIN — LEVOTHYROXINE SODIUM 75 MCG: 0.07 TABLET ORAL at 09:47

## 2024-05-07 RX ADMIN — HEPARIN SODIUM 5000 UNITS: 5000 INJECTION INTRAVENOUS; SUBCUTANEOUS at 09:47

## 2024-05-07 RX ADMIN — HYDROXYZINE HYDROCHLORIDE 10 MG: 10 TABLET ORAL at 13:53

## 2024-05-07 RX ADMIN — ASPIRIN 81 MG: 81 TABLET, CHEWABLE ORAL at 09:47

## 2024-05-07 RX ADMIN — OXYCODONE HYDROCHLORIDE 5 MG: 5 TABLET ORAL at 13:54

## 2024-05-07 RX ADMIN — METOPROLOL TARTRATE 12.5 MG: 25 TABLET, FILM COATED ORAL at 09:48

## 2024-05-07 RX ADMIN — HYDROXYZINE HYDROCHLORIDE 10 MG: 10 TABLET ORAL at 09:47

## 2024-05-07 RX ADMIN — HYDROXYZINE HYDROCHLORIDE 10 MG: 10 TABLET ORAL at 20:13

## 2024-05-07 RX ADMIN — SODIUM CHLORIDE, PRESERVATIVE FREE 10 ML: 5 INJECTION INTRAVENOUS at 09:49

## 2024-05-07 RX ADMIN — DILTIAZEM HYDROCHLORIDE 240 MG: 240 CAPSULE, EXTENDED RELEASE ORAL at 09:47

## 2024-05-07 RX ADMIN — HEPARIN SODIUM 5000 UNITS: 5000 INJECTION INTRAVENOUS; SUBCUTANEOUS at 20:13

## 2024-05-07 RX ADMIN — OXYCODONE HYDROCHLORIDE 5 MG: 5 TABLET ORAL at 09:47

## 2024-05-07 RX ADMIN — CLOPIDOGREL BISULFATE 75 MG: 75 TABLET ORAL at 09:47

## 2024-05-07 RX ADMIN — PANTOPRAZOLE SODIUM 40 MG: 40 TABLET, DELAYED RELEASE ORAL at 09:47

## 2024-05-07 RX ADMIN — MORPHINE SULFATE 2 MG: 2 INJECTION, SOLUTION INTRAMUSCULAR; INTRAVENOUS at 22:01

## 2024-05-07 RX ADMIN — METOPROLOL TARTRATE 12.5 MG: 25 TABLET, FILM COATED ORAL at 20:14

## 2024-05-07 ASSESSMENT — PAIN - FUNCTIONAL ASSESSMENT
PAIN_FUNCTIONAL_ASSESSMENT: PREVENTS OR INTERFERES SOME ACTIVE ACTIVITIES AND ADLS

## 2024-05-07 ASSESSMENT — PAIN SCALES - GENERAL
PAINLEVEL_OUTOF10: 0
PAINLEVEL_OUTOF10: 10
PAINLEVEL_OUTOF10: 3
PAINLEVEL_OUTOF10: 7
PAINLEVEL_OUTOF10: 8

## 2024-05-07 ASSESSMENT — PAIN DESCRIPTION - ORIENTATION
ORIENTATION: LEFT

## 2024-05-07 ASSESSMENT — PAIN DESCRIPTION - ONSET
ONSET: SUDDEN

## 2024-05-07 ASSESSMENT — PAIN DESCRIPTION - DESCRIPTORS
DESCRIPTORS: ACHING

## 2024-05-07 ASSESSMENT — PAIN SCALES - WONG BAKER
WONGBAKER_NUMERICALRESPONSE: NO HURT

## 2024-05-07 ASSESSMENT — PAIN DESCRIPTION - LOCATION
LOCATION: HIP

## 2024-05-07 ASSESSMENT — PAIN DESCRIPTION - FREQUENCY
FREQUENCY: CONTINUOUS

## 2024-05-07 ASSESSMENT — PAIN DESCRIPTION - PAIN TYPE
TYPE: ACUTE PAIN

## 2024-05-07 NOTE — PLAN OF CARE
Problem: Discharge Planning  Goal: Discharge to home or other facility with appropriate resources  5/7/2024 1046 by Crys Hylton RN  Outcome: Progressing  5/7/2024 0710 by Bonifacio Aguilar RN  Outcome: Progressing     Problem: Pain  Goal: Verbalizes/displays adequate comfort level or baseline comfort level  5/7/2024 1046 by Crys Hylton RN  Outcome: Progressing  5/7/2024 0710 by Bonifacio Aguilar RN  Outcome: Progressing     Problem: Skin/Tissue Integrity  Goal: Absence of new skin breakdown  Description: 1.  Monitor for areas of redness and/or skin breakdown  2.  Assess vascular access sites hourly  3.  Every 4-6 hours minimum:  Change oxygen saturation probe site  4.  Every 4-6 hours:  If on nasal continuous positive airway pressure, respiratory therapy assess nares and determine need for appliance change or resting period.  5/7/2024 1046 by Crys Hylton RN  Outcome: Progressing  5/7/2024 0710 by Bonifacio Aguilar RN  Outcome: Progressing     Problem: Safety - Adult  Goal: Free from fall injury  5/7/2024 1046 by Crys Hylton RN  Outcome: Progressing  5/7/2024 0710 by Bonifacio Aguilar RN  Outcome: Progressing     Problem: ABCDS Injury Assessment  Goal: Absence of physical injury  5/7/2024 1046 by Crys Hylton RN  Outcome: Progressing  5/7/2024 0710 by Bonifacio Aguilar RN  Outcome: Progressing     Problem: Neurosensory - Adult  Goal: Achieves stable or improved neurological status  Outcome: Progressing     Problem: Cardiovascular - Adult  Goal: Maintains optimal cardiac output and hemodynamic stability  Outcome: Progressing     Problem: Skin/Tissue Integrity - Adult  Goal: Skin integrity remains intact  Outcome: Progressing     Problem: Musculoskeletal - Adult  Goal: Return mobility to safest level of function  Outcome: Progressing     Problem: Genitourinary - Adult  Goal: Absence of urinary retention  Outcome: Progressing

## 2024-05-07 NOTE — PROGRESS NOTES
In-Patient Progress Note    Patient:  Breezy Hernández 89 y.o. female MRN: [id number]     Date of Service: 5/7/2024    Hospital Day: 4      Chief complaint: had no chief complaint listed for this encounter.      Assessment and Plan   Breezy Hernández, a 89 y.o. female, was admitted on 5/4/2024 with complaints of had no chief complaint listed for this encounter.    Assessment and plan    Left femoral neck fracture-x-ray of left hip shows acute fracture of the base of the left femoral neck with displacement.  Appears to have been a mechanical fall.  Orthopedic surgery following.  Patient underwent intramedullary nail abe insertion for the left femoral neck fracture POD 2, hemoglobin downtrending, will check FOBT  A-fib with RVR-likely due to combination of pain from hip fracture and patient not receiving her home medications today.  Restart home meds of diltiazem and metoprolol.  Cardizem drip was initiated that heart rate control-off of drip. HR controlled on metoprolol 12.5 mg twice daily. Eliquis was held for surgery and was given a dose of Lovenox 1 Mg per KG on the day of admission.  Restarted eliquis but patient is a high fall risk - switched to aspirin and plavix. DVT prophylaxis dose of heparin.  CKD 3-creatinine 1.1.  This appears to be around patient's baseline.  Chronic systolic/diastolic CHF-continue GDMT as tolerated            Diet ADULT DIET; Regular   DVT Prophylaxis [] Lovenox, [x]  Heparin, [] SCDs, [] Ambulation,  [] Eliquis, [] Xarelto  [] Coumadin   Peptic ulcer prophylaxis -   Code Status Full Code   Disposition From / Current living situation : home  Expected Disposition: SNF  Estimated Date of Discharge: TBD  Patient requires continued admission due to placement   Surrogate Decision Maker/ SHABNAM Haley (katherine)       Personally reviewed Lab Studies and Imaging         Subjective:     Chief Complaint: No chief complaint on file.       Breezy Hernández is a 89 y.o. female who presents after a  Please follow up with ordering provider.     CT HIP LEFT WO CONTRAST    Result Date: 5/4/2024  Reason: Reactive planning, hip pain CT hip left without contrast TECHNIQUE: Collimated helical images are made through the left hip in a bone algorithm. Up-to-date CT equipment and radiation dose reduction techniques were employed. FINDINGS: Within the anterior pelvis a large 14 x 17 cm cystic lesion with septations. Diverticula identified along the sigmoid colon. A comminuted intratrochanteric fracture the proximal left femur is identified. Femoral heads located within acetabulum. Sacral ala place.     1. 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 2. Comminuted intratrochanteric fracture of the proximal left femur. Femoral head located within acetabulum. 3. Sigmoid diverticulosis Electronically signed by MD Sumeet Nunn    XR HIP 2-3 VW W PELVIS LEFT    Result Date: 5/4/2024  AP PELVIS AND LEFT HIP: HISTORY: Left hip pain. TECHNIQUE: AP view of the pelvis and additional AP and lateral views of the left hip are obtained. FINDINGS: There is an acute fracture of the base of the neck of the left femur, with mild displacement. The femoral shaft is displaced superiorly. The femoral head remains located within the acetabulum. The bony pelvis is intact. Skin staples project over the anterior lower abdominal and pelvic midline.     Acute fracture of the base of the left femoral neck with displacement. Electronically signed by Natasha Landers MD      Recent Results (from the past 24 hour(s))   Hemoglobin and Hematocrit    Collection Time: 05/06/24  6:30 PM   Result Value Ref Range    Hemoglobin 8.3 (L) 12.5 - 16.0 GM/DL    Hematocrit 27.5 (L) 37 - 47 %   Hemoglobin and Hematocrit    Collection Time: 05/07/24 10:53 AM   Result Value Ref Range    Hemoglobin 7.7 (L) 12.5 - 16.0 GM/DL    Hematocrit 25.4 (L) 37 - 47 %           Electronically signed by Vida Chavez MD on 5/7/2024 at 12:07

## 2024-05-07 NOTE — PROGRESS NOTES
Breezy Hernández (3/26/1931)    Daily Progress Note-  Francisco Montilla DO                   Today's Date:   5/7/2024          Subjective:   Patient seen and examined resting comfortably in bed  Doing well postoperatively.    Pain controlled  No issues overnight per nursing staff  Per the notes, it does not appear that the patient is work with therapy or been weightbearing yet.  Again, she does not communicate very well with me    Objective:    No data found.       Vitals:    05/06/24 2117   BP: 115/83   Pulse: 65   Resp: 18   Temp: 97.8 °F (36.6 °C)   SpO2: 100%        Physical Exam:     The patient is  awake and confused, at baseline  Resting comfortably in bed    Operative extremity:    The dressing is mildly saturated with blood but intact  Sensation and motor function intact distally  Leg lengths equal and appropriately aligned  Minimally tender over incision sites with no hematoma    No additional areas of tenderness or pain in the bilateral upper extremities or contralateral lower extremity.  Neurovascularly intact throughout bilateral upper extremities and contralateral lower extremity    Brisk capillary refill and negative Homans bilaterally.  Compartments are soft and compressible      LABS   CBC:   Recent Labs     05/05/24  0148 05/06/24  0535 05/06/24  1830   WBC 10.4  --   --    HGB 12.4* 9.1* 8.3*     --   --        IMAGING   No new orthopedic imaging    Assessment and Plan     1.  POD # 2  Hip left cephalomedullary nailing    1:  Physical, Occupational therapy consult for mobilization   -Toe-touch weightbearing    -No additional precautions  2:  DVT prophylaxis   -Switch from Lovenox to heparin after discussion with the pharmacist  3:  Continue Pain Control   -wean to PO meds  4.  Medical management per hospitalist service   -Continue to monitor hemoglobin.  Continues downward

## 2024-05-07 NOTE — CARE COORDINATION
Per Dr. Benites, Pt not appropriate for [facility] at this time.     CM call to [facility], Pt's second choice.     2:38 PM   CM call to [facility], they are able to accept Pt when medically ready and insurance approves    Pt family in room, they remain agreeable with discharge plan.      PS to Dr. Chavez to update, plan to start precert tomorrow.

## 2024-05-07 NOTE — PROGRESS NOTES
Occupational Therapy  Occupational therapy attempted to see pt today for treatment. Pt in bed sleeping. Pt deferred occupational therapy session with stated reasons of wanting to sleep. Pt reports that she would be agreeable to OT treatment tomorrow when less tired. Will re-attempt treatment at a later time.      Alert and oriented to person, place and time

## 2024-05-07 NOTE — PROGRESS NOTES
Physical Therapy    Physical Therapy Treatment Note  Name: Breezy Hernández MRN: [id number] :   3/26/1931   Date:  2024   Admission Date: 2024 Room:  [address] [place] [address]   Restrictions/Precautions:          TTWB LLE, fall risk, ge prec  Communication with other providers:  nurse states pt ok to treat, recently received pain meds  Subjective:  Patient states:  agreeable  Pain:   Location, Type, Intensity (0/10 to 10/10):  does not rate, but pain /c movement    Objective:    Observation:  in bed, L hip resting in IR and L knee flexed across   Treatment, including education/measures:  Edu on proper hip positioning in bed for optimal recovery and joint integrity.   Extensive focus this session on LLE mobility to achieve neutral positioning. Pt extremely guarded and ttp, pain painful /c movement, trouble relaxing, but able to improve mobility /c AAROM PTA supported throughout range. Performed slowly in increments. Pt left in supine /c HOB raised, blanket roll between LE's to prevent excess add, knee in ext and hip in neutral comfortably at end of session.    STS maxAx1 /c partial stand x2sets /c ttwb, but unable to achieve full stand. Vc for body mechanics.  Bed mob maxAx1 /c very slow movements. Vc for sequencing.  Sit balance EOB Xx~10'; LAQ/AAROM knee ext/flex LLE /c minimal ROM.  vc for safe techs.     Safety  Patient left safely in the bed, with call light/phone in reach with alarm applied. Gait belt was used for transfers and gait.    Assessment / Impression:    Patient's tolerance of treatment:  good   Adverse Reaction: na  Significant change in status and impact:  na  Barriers to improvement:  weakness and endurance  Plan for Next Session:    Cont transfer training  Time in:  1000  Time out:  1039  Timed treatment minutes:  39  Total treatment time:  39    Previously filed items:           Short Term Goals  Time Frame for Short Term Goals: 1 week  Short Term Goal 1: pt to complete all bed mobility min A  Short

## 2024-05-08 ENCOUNTER — APPOINTMENT (OUTPATIENT)
Dept: GENERAL RADIOLOGY | Age: 89
End: 2024-05-08
Attending: INTERNAL MEDICINE
Payer: MEDICARE

## 2024-05-08 LAB
ALBUMIN SERPL-MCNC: 3.3 GM/DL (ref 3.4–5)
ALP BLD-CCNC: 113 IU/L (ref 40–128)
ALT SERPL-CCNC: 7 U/L (ref 10–40)
ANION GAP SERPL CALCULATED.3IONS-SCNC: 8 MMOL/L (ref 7–16)
AST SERPL-CCNC: 33 IU/L (ref 15–37)
BILIRUB SERPL-MCNC: 0.5 MG/DL (ref 0–1)
BUN SERPL-MCNC: 35 MG/DL (ref 6–23)
CALCIUM SERPL-MCNC: 7.9 MG/DL (ref 8.3–10.6)
CHLORIDE BLD-SCNC: 107 MMOL/L (ref 99–110)
CO2: 25 MMOL/L (ref 21–32)
CREAT SERPL-MCNC: 1.5 MG/DL (ref 0.6–1.1)
GFR, ESTIMATED: 32 ML/MIN/1.73M2
GLUCOSE SERPL-MCNC: 128 MG/DL (ref 70–99)
HCT VFR BLD CALC: 24 % (ref 37–47)
HEMOGLOBIN: 7.4 GM/DL (ref 12.5–16)
POTASSIUM SERPL-SCNC: 4.6 MMOL/L (ref 3.5–5.1)
SODIUM BLD-SCNC: 140 MMOL/L (ref 135–145)
TOTAL PROTEIN: 5.6 GM/DL (ref 6.4–8.2)

## 2024-05-08 PROCEDURE — 6360000002 HC RX W HCPCS: Performed by: STUDENT IN AN ORGANIZED HEALTH CARE EDUCATION/TRAINING PROGRAM

## 2024-05-08 PROCEDURE — 51701 INSERT BLADDER CATHETER: CPT

## 2024-05-08 PROCEDURE — 97535 SELF CARE MNGMENT TRAINING: CPT

## 2024-05-08 PROCEDURE — 80053 COMPREHEN METABOLIC PANEL: CPT

## 2024-05-08 PROCEDURE — 97530 THERAPEUTIC ACTIVITIES: CPT

## 2024-05-08 PROCEDURE — 6370000000 HC RX 637 (ALT 250 FOR IP): Performed by: STUDENT IN AN ORGANIZED HEALTH CARE EDUCATION/TRAINING PROGRAM

## 2024-05-08 PROCEDURE — 36415 COLL VENOUS BLD VENIPUNCTURE: CPT

## 2024-05-08 PROCEDURE — 74018 RADEX ABDOMEN 1 VIEW: CPT

## 2024-05-08 PROCEDURE — 85018 HEMOGLOBIN: CPT

## 2024-05-08 PROCEDURE — 2580000003 HC RX 258: Performed by: STUDENT IN AN ORGANIZED HEALTH CARE EDUCATION/TRAINING PROGRAM

## 2024-05-08 PROCEDURE — 2140000000 HC CCU INTERMEDIATE R&B

## 2024-05-08 PROCEDURE — 6370000000 HC RX 637 (ALT 250 FOR IP): Performed by: INTERNAL MEDICINE

## 2024-05-08 PROCEDURE — 85014 HEMATOCRIT: CPT

## 2024-05-08 PROCEDURE — 51798 US URINE CAPACITY MEASURE: CPT

## 2024-05-08 RX ORDER — LACTULOSE 10 G/15ML
10 SOLUTION ORAL 3 TIMES DAILY
Status: COMPLETED | OUTPATIENT
Start: 2024-05-08 | End: 2024-05-09

## 2024-05-08 RX ADMIN — HYDROXYZINE HYDROCHLORIDE 10 MG: 10 TABLET ORAL at 20:39

## 2024-05-08 RX ADMIN — CLOPIDOGREL BISULFATE 75 MG: 75 TABLET ORAL at 10:53

## 2024-05-08 RX ADMIN — LACTULOSE 10 G: 20 SOLUTION ORAL at 17:10

## 2024-05-08 RX ADMIN — HYDROXYZINE HYDROCHLORIDE 10 MG: 10 TABLET ORAL at 10:53

## 2024-05-08 RX ADMIN — DILTIAZEM HYDROCHLORIDE 240 MG: 240 CAPSULE, EXTENDED RELEASE ORAL at 10:53

## 2024-05-08 RX ADMIN — SODIUM CHLORIDE, PRESERVATIVE FREE 10 ML: 5 INJECTION INTRAVENOUS at 20:41

## 2024-05-08 RX ADMIN — OXYCODONE HYDROCHLORIDE 5 MG: 5 TABLET ORAL at 06:18

## 2024-05-08 RX ADMIN — HEPARIN SODIUM 5000 UNITS: 5000 INJECTION INTRAVENOUS; SUBCUTANEOUS at 10:54

## 2024-05-08 RX ADMIN — METOPROLOL TARTRATE 12.5 MG: 25 TABLET, FILM COATED ORAL at 20:40

## 2024-05-08 RX ADMIN — LEVOTHYROXINE SODIUM 75 MCG: 0.07 TABLET ORAL at 06:19

## 2024-05-08 RX ADMIN — METOPROLOL TARTRATE 12.5 MG: 25 TABLET, FILM COATED ORAL at 10:52

## 2024-05-08 RX ADMIN — LACTULOSE 10 G: 20 SOLUTION ORAL at 20:40

## 2024-05-08 RX ADMIN — HEPARIN SODIUM 5000 UNITS: 5000 INJECTION INTRAVENOUS; SUBCUTANEOUS at 20:40

## 2024-05-08 RX ADMIN — ASPIRIN 81 MG: 81 TABLET, CHEWABLE ORAL at 10:53

## 2024-05-08 ASSESSMENT — PAIN SCALES - GENERAL
PAINLEVEL_OUTOF10: 0
PAINLEVEL_OUTOF10: 0

## 2024-05-08 ASSESSMENT — PAIN SCALES - WONG BAKER
WONGBAKER_NUMERICALRESPONSE: NO HURT
WONGBAKER_NUMERICALRESPONSE: HURTS A LITTLE BIT

## 2024-05-08 NOTE — PROGRESS NOTES
Occupational Therapy Treatment Note    Name: Breezy Hernández MRN: [id number] :   3/26/1931   Date:  2024   Admission Date: 2024 Room:  [address] [place] [address]       Restrictions/Precautions:  TTWB L LE, general precautions, fall risk     Communication with other providers: RN approval of therapy, cotx with PTA, OT present during session    Subjective:  Patient states:  \" Whatever you want to do\"   Pain:   Location, Type, Intensity (0/10 to 10/10): general pain noted but unable to rate or locate pain     Objective:    Observation: pt lying supine in bed upon arrival, agreeable to therapy   Objective Measures:  tele, HR afib ranging from  at highest and /65 (80)     Treatment, including education:  Therapeutic Activity Training:   Therapeutic activity training was instructed today.  Cues were given for safety, sequence, UE/LE placement, awareness, and balance.    Activities performed today included bed mobility training, sup-sit, sit-stand, squat pivot    Self Care Training:   Cues were given for safety, sequence, UE/LE placement, visual cues, and balance.    Activities performed today included LB dressing tasks, toileting, grooming      Supine to EOB x2 with max A x2 for trunk elevation, hip pivot, and LE maneuvering. Sit EOB ~10 minutes with ModA fluctuating throughout to CGA for trunk stability and balance safety. Comb hair sitting EOB with mod A for thoroughness, pt able to reach right proximal side of hair, mod v/c's to sustain attention to task. STS from EOB with max A x2 for force production and walker stabilization, maintaining LLE WB precautions, max v/c's for sequencing (pt reported bowel movement upon STS and was returned to bed to complete toilet hygiene). EOB to supine with max A x2 for LE maneuvering, hip pivot, and trunk control. Roll L/R with mod A for force production. Maintain side lying positioning with mod A for trunk stability. Don/doff depends with DepA for placement and completion of

## 2024-05-08 NOTE — PLAN OF CARE
Problem: Discharge Planning  Goal: Discharge to home or other facility with appropriate resources  Outcome: Progressing     Problem: Pain  Goal: Verbalizes/displays adequate comfort level or baseline comfort level  Outcome: Progressing     Problem: Skin/Tissue Integrity  Goal: Absence of new skin breakdown  Description: 1.  Monitor for areas of redness and/or skin breakdown  2.  Assess vascular access sites hourly  3.  Every 4-6 hours minimum:  Change oxygen saturation probe site  4.  Every 4-6 hours:  If on nasal continuous positive airway pressure, respiratory therapy assess nares and determine need for appliance change or resting period.  Outcome: Progressing     Problem: Safety - Adult  Goal: Free from fall injury  Outcome: Progressing     Problem: ABCDS Injury Assessment  Goal: Absence of physical injury  Outcome: Progressing     Problem: Neurosensory - Adult  Goal: Achieves stable or improved neurological status  Outcome: Progressing     Problem: Cardiovascular - Adult  Goal: Maintains optimal cardiac output and hemodynamic stability  Outcome: Progressing     Problem: Skin/Tissue Integrity - Adult  Goal: Skin integrity remains intact  Outcome: Progressing     Problem: Musculoskeletal - Adult  Goal: Return mobility to safest level of function  Outcome: Progressing     Problem: Genitourinary - Adult  Goal: Absence of urinary retention  Outcome: Progressing     Problem: Gastrointestinal - Adult  Goal: Minimal or absence of nausea and vomiting  Outcome: Progressing     Problem: Hematologic - Adult  Goal: Maintains hematologic stability  Outcome: Progressing

## 2024-05-08 NOTE — PROGRESS NOTES
In-Patient Progress Note    Patient:  Breezy Hernández 89 y.o. female MRN: [id number]     Date of Service: 5/8/2024    Hospital Day: 5      Chief complaint: had no chief complaint listed for this encounter.      Assessment and Plan   Breezy Hernández, a 89 y.o. female, was admitted on 5/4/2024 with complaints of had no chief complaint listed for this encounter.    Assessment and plan    Left femoral neck fracture-x-ray of left hip shows acute fracture of the base of the left femoral neck with displacement.  Appears to have been a mechanical fall.  Orthopedic surgery following.  Patient underwent intramedullary nail abe insertion for the left femoral neck fracture POD 3, hemoglobin downtrending, will check FOBT  A-fib with RVR-likely due to combination of pain from hip fracture and patient not receiving her home medications today.  Restart home meds of diltiazem and metoprolol.  Cardizem drip was initiated that heart rate control-off of drip. HR controlled on metoprolol 12.5 mg twice daily. Eliquis was held for surgery and was given a dose of Lovenox 1 Mg per KG on the day of admission.  Restarted eliquis but patient is a high fall risk - switched to aspirin and plavix. DVT prophylaxis dose of heparin.  CKD 3-creatinine 1.5.  This appears to be around patient's baseline.  Chronic systolic/diastolic CHF-continue GDMT as tolerated            Diet ADULT DIET; Regular   DVT Prophylaxis [] Lovenox, [x]  Heparin, [] SCDs, [] Ambulation,  [] Eliquis, [] Xarelto  [] Coumadin   Peptic ulcer prophylaxis -   Code Status Full Code   Disposition From / Current living situation : home  Expected Disposition: SNF  Estimated Date of Discharge: TBD  Patient requires continued admission due to placement   Surrogate Decision Maker/ SHABNAM Haley (katherine)       Personally reviewed Lab Studies and Imaging         Subjective:     Chief Complaint: No chief complaint on file.       Breezy Hernández is a 89 y.o. female who presents after a

## 2024-05-08 NOTE — CARE COORDINATION
Precert initiated via [employer]: APPROVED  [id number]  [id number]  Approved 05/08/2024-05/10/2024  Electronic PAS completed

## 2024-05-08 NOTE — PROGRESS NOTES
Patient had no out on this nurses shift. Nurse bladder scanned patient. Scanner showed 999 in bladder. Straight cathed patient for 800 ml. Patient tolerated procedure well.

## 2024-05-08 NOTE — CARE COORDINATION
Pt insurance approved SNF placement.  PS to Dr. Chavez to update.  Plan discharge tomorrow    [facility] updated

## 2024-05-08 NOTE — PROGRESS NOTES
Physical Therapy Treatment Note  Name: Breezy Hernández MRN: [id number] :   3/26/1931   Date:  2024   Admission Date: 2024 Room:  [facility]   Restrictions/Precautions:   TTWB LLE, fall risk, gen prec  Communication with other providers:  Pt okay to see for therapy per RN, Celio with JOANNE Baxter for pt safety, OT Natalia supervising.   Subjective:  Patient states:  \"you girls are so pretty\"   Pain:   Location, Type, Intensity (0/10 to 10/10):  Endorses pn with movement, does not rate.   Objective:    Observation:  Pt supine in bed upon therapy arrival.   Objective Measures:  Tele, HR  at highest and /65 (80)   Treatment, including education/measures:    Therapeutic Activity Training:   Therapeutic activity training was instructed today.  Cues were given for safety, sequence, UE/LE placement, awareness, and balance.    Activities performed today included bed mobility training, sup-sit, sit-stand, SPT.    Mobility:  Sup <> sit: x2 reps with Max A x 2 both ways at trunk and LEs.   Scooting. Max A at hips to scoot, CGA-Mod A for sitting balance.   STS: Max A x 2 at EOB with minimal clearance of buttocks and pt reports having bowel movement in standing. Pt returned to bed for cleanup.   SPT: Squat-pivot transfer Max A of one therapy hug, Mod A of another at L LE for WB precautions and R LE to ensure foot remains planted on floor.     Safety:   Pt returned safely to recliner with chair alarm activated, call light in reach, all needs met.   Assessment / Impression:    Pt tolerated OOB activities well this date but does present with high HR with afib throughout session. Pt continues to demo significant functional mobility deficits but is motivated to participate in therapy.   Patient's tolerance of treatment:  Good   Adverse Reaction: none  Significant change in status and impact:  none  Barriers to improvement:  HR, strength, endurance, balance.   Plan for Next Session:    Plan to continue OOB  activities.   Time in:  1005  Time out:  1045  Timed treatment minutes:  40  Total treatment time:  40    Previously filed items:           Short Term Goals  Time Frame for Short Term Goals: 1 week  Short Term Goal 1: pt to complete all bed mobility min A  Short Term Goal 2: pt to complete all STS transfers to/from bed, commode, and chair min A  Short Term Goal 3: pt to ambulate 15' with LRAD min A    Electronically signed by:    Maggie Sparks PTA  5/8/2024, 12:42 PM

## 2024-05-08 NOTE — PROGRESS NOTES
05/08/24 1552   Encounter Summary   Encounter Overview/Reason Initial Encounter   Service Provided For Patient and family together   Referral/Consult From [facility]   Support System Family members   Last Encounter  05/08/24  (provided family support. Patient was deeply sleeping)   Complexity of Encounter Low   Begin Time 1545   End Time  1600   Total Time Calculated 15 min   Spiritual/Emotional needs   Type Spiritual Support   Grief, Loss, and Adjustments   Type Adjustment to illness   Assessment/Intervention/Outcome   Assessment Coping   Intervention Active listening;Empowerment;Sustaining Presence/Ministry of presence   Outcome Coping;Encouraged;Engaged in conversation;Expressed Gratitude;Receptive   Plan and Referrals   Plan/Referrals Continue Support (comment)  (as needed)

## 2024-05-08 NOTE — PROGRESS NOTES
Patients heart rate ranged 120s-150s. Patient [place], confused and unable to make needs known. Nurse repositioned patient, PRN morphine given. Patient heart rate, stabilized heart rate mid 90's. Nurse placed 2 liters of oxygen on patient. No distress noted. Notified APRN, of findings.

## 2024-05-08 NOTE — PLAN OF CARE
Problem: Discharge Planning  Goal: Discharge to home or other facility with appropriate resources  5/7/2024 2244 by Katerina Francisco RN  Outcome: Progressing  5/7/2024 1046 by Crys Hylton RN  Outcome: Progressing     Problem: Pain  Goal: Verbalizes/displays adequate comfort level or baseline comfort level  5/7/2024 2244 by Katerina Francisco RN  Outcome: Progressing  5/7/2024 1046 by Crys Hylton RN  Outcome: Progressing     Problem: Skin/Tissue Integrity  Goal: Absence of new skin breakdown  Description: 1.  Monitor for areas of redness and/or skin breakdown  2.  Assess vascular access sites hourly  3.  Every 4-6 hours minimum:  Change oxygen saturation probe site  4.  Every 4-6 hours:  If on nasal continuous positive airway pressure, respiratory therapy assess nares and determine need for appliance change or resting period.  5/7/2024 2244 by Katerina Francisco RN  Outcome: Progressing  5/7/2024 1046 by Crys Hylton RN  Outcome: Progressing     Problem: Safety - Adult  Goal: Free from fall injury  5/7/2024 2244 by Katerina Francisco RN  Outcome: Progressing  5/7/2024 1046 by Crys Hylton RN  Outcome: Progressing     Problem: ABCDS Injury Assessment  Goal: Absence of physical injury  5/7/2024 2244 by Katerina Francisco RN  Outcome: Progressing  5/7/2024 1046 by Crys Hylton RN  Outcome: Progressing     Problem: Neurosensory - Adult  Goal: Achieves stable or improved neurological status  5/7/2024 2244 by Katerina Francisco RN  Outcome: Progressing  5/7/2024 1046 by Crys Hylton RN  Outcome: Progressing     Problem: Cardiovascular - Adult  Goal: Maintains optimal cardiac output and hemodynamic stability  5/7/2024 2244 by Katerina Francisco RN  Outcome: Progressing  5/7/2024 1046 by Crys Hylton RN  Outcome: Progressing     Problem: Skin/Tissue Integrity - Adult  Goal: Skin integrity remains intact  5/7/2024 2244 by Katerina Francisco RN  Outcome: Progressing  5/7/2024 1046 by

## 2024-05-09 VITALS
HEIGHT: 59 IN | RESPIRATION RATE: 27 BRPM | HEART RATE: 48 BPM | SYSTOLIC BLOOD PRESSURE: 139 MMHG | DIASTOLIC BLOOD PRESSURE: 82 MMHG | WEIGHT: 109.8 LBS | OXYGEN SATURATION: 99 % | BODY MASS INDEX: 22.13 KG/M2 | TEMPERATURE: 97.9 F

## 2024-05-09 LAB
ANION GAP SERPL CALCULATED.3IONS-SCNC: 6 MMOL/L (ref 7–16)
BASOPHILS ABSOLUTE: 0 K/CU MM
BASOPHILS RELATIVE PERCENT: 0.2 % (ref 0–1)
BUN SERPL-MCNC: 27 MG/DL (ref 6–23)
CALCIUM SERPL-MCNC: 7.4 MG/DL (ref 8.3–10.6)
CHLORIDE BLD-SCNC: 107 MMOL/L (ref 99–110)
CO2: 27 MMOL/L (ref 21–32)
CREAT SERPL-MCNC: 1.1 MG/DL (ref 0.6–1.1)
DIFFERENTIAL TYPE: ABNORMAL
EOSINOPHILS ABSOLUTE: 0.4 K/CU MM
EOSINOPHILS RELATIVE PERCENT: 4 % (ref 0–3)
FERRITIN: 248 NG/ML (ref 15–150)
FOLATE SERPL-MCNC: 4.1 NG/ML (ref 3.1–17.5)
GFR, ESTIMATED: 47 ML/MIN/1.73M2
GLUCOSE SERPL-MCNC: 123 MG/DL (ref 70–99)
HCT VFR BLD CALC: 23.3 % (ref 37–47)
HEMOGLOBIN: 7.1 GM/DL (ref 12.5–16)
IMMATURE NEUTROPHIL %: 0.9 % (ref 0–0.43)
IRON: 28 UG/DL (ref 37–145)
LYMPHOCYTES ABSOLUTE: 2.4 K/CU MM
LYMPHOCYTES RELATIVE PERCENT: 22.4 % (ref 24–44)
MCH RBC QN AUTO: 29.8 PG (ref 27–31)
MCHC RBC AUTO-ENTMCNC: 30.5 % (ref 32–36)
MCV RBC AUTO: 97.9 FL (ref 78–100)
MONOCYTES ABSOLUTE: 1.1 K/CU MM
MONOCYTES RELATIVE PERCENT: 10.7 % (ref 0–4)
NEUTROPHILS ABSOLUTE: 6.5 K/CU MM
NEUTROPHILS RELATIVE PERCENT: 61.8 % (ref 36–66)
NUCLEATED RBC %: 0.2 %
PCT TRANSFERRIN: 23 % (ref 10–44)
PDW BLD-RTO: 15 % (ref 11.7–14.9)
PLATELET # BLD: 191 K/CU MM (ref 140–440)
PMV BLD AUTO: 9.7 FL (ref 7.5–11.1)
POTASSIUM SERPL-SCNC: 4.3 MMOL/L (ref 3.5–5.1)
RBC # BLD: 2.38 M/CU MM (ref 4.2–5.4)
SODIUM BLD-SCNC: 140 MMOL/L (ref 135–145)
TOTAL IMMATURE NEUTOROPHIL: 0.09 K/CU MM
TOTAL IRON BINDING CAPACITY: 124 UG/DL (ref 250–450)
TOTAL NUCLEATED RBC: 0 K/CU MM
UNSATURATED IRON BINDING CAPACITY: 96 UG/DL (ref 110–370)
VITAMIN B-12: 472.9 PG/ML (ref 211–911)
WBC # BLD: 10.6 K/CU MM (ref 4–10.5)

## 2024-05-09 PROCEDURE — 2580000003 HC RX 258: Performed by: STUDENT IN AN ORGANIZED HEALTH CARE EDUCATION/TRAINING PROGRAM

## 2024-05-09 PROCEDURE — 80048 BASIC METABOLIC PNL TOTAL CA: CPT

## 2024-05-09 PROCEDURE — 6370000000 HC RX 637 (ALT 250 FOR IP): Performed by: STUDENT IN AN ORGANIZED HEALTH CARE EDUCATION/TRAINING PROGRAM

## 2024-05-09 PROCEDURE — 97530 THERAPEUTIC ACTIVITIES: CPT

## 2024-05-09 PROCEDURE — 83540 ASSAY OF IRON: CPT

## 2024-05-09 PROCEDURE — 6360000002 HC RX W HCPCS: Performed by: STUDENT IN AN ORGANIZED HEALTH CARE EDUCATION/TRAINING PROGRAM

## 2024-05-09 PROCEDURE — 82746 ASSAY OF FOLIC ACID SERUM: CPT

## 2024-05-09 PROCEDURE — 85025 COMPLETE CBC W/AUTO DIFF WBC: CPT

## 2024-05-09 PROCEDURE — 36415 COLL VENOUS BLD VENIPUNCTURE: CPT

## 2024-05-09 PROCEDURE — 82607 VITAMIN B-12: CPT

## 2024-05-09 PROCEDURE — 83550 IRON BINDING TEST: CPT

## 2024-05-09 PROCEDURE — 82728 ASSAY OF FERRITIN: CPT

## 2024-05-09 PROCEDURE — 6370000000 HC RX 637 (ALT 250 FOR IP): Performed by: INTERNAL MEDICINE

## 2024-05-09 RX ORDER — ASPIRIN 81 MG/1
81 TABLET, CHEWABLE ORAL DAILY
Qty: 30 TABLET | Refills: 3 | Status: ON HOLD | OUTPATIENT
Start: 2024-05-10

## 2024-05-09 RX ORDER — CLOPIDOGREL BISULFATE 75 MG/1
75 TABLET ORAL DAILY
Qty: 30 TABLET | Refills: 3 | Status: ON HOLD | OUTPATIENT
Start: 2024-05-10

## 2024-05-09 RX ORDER — FOLIC ACID 1 MG/1
1 TABLET ORAL DAILY
Qty: 90 TABLET | Refills: 1 | Status: ON HOLD | OUTPATIENT
Start: 2024-05-09

## 2024-05-09 RX ADMIN — MORPHINE SULFATE 2 MG: 2 INJECTION, SOLUTION INTRAMUSCULAR; INTRAVENOUS at 05:14

## 2024-05-09 RX ADMIN — CLOPIDOGREL BISULFATE 75 MG: 75 TABLET ORAL at 07:48

## 2024-05-09 RX ADMIN — OXYCODONE HYDROCHLORIDE 10 MG: 10 TABLET ORAL at 07:48

## 2024-05-09 RX ADMIN — LEVOTHYROXINE SODIUM 75 MCG: 0.07 TABLET ORAL at 06:29

## 2024-05-09 RX ADMIN — DILTIAZEM HYDROCHLORIDE 240 MG: 240 CAPSULE, EXTENDED RELEASE ORAL at 07:48

## 2024-05-09 RX ADMIN — PANTOPRAZOLE SODIUM 40 MG: 40 TABLET, DELAYED RELEASE ORAL at 06:29

## 2024-05-09 RX ADMIN — METOPROLOL TARTRATE 12.5 MG: 25 TABLET, FILM COATED ORAL at 07:48

## 2024-05-09 RX ADMIN — ASPIRIN 81 MG: 81 TABLET, CHEWABLE ORAL at 07:48

## 2024-05-09 RX ADMIN — HEPARIN SODIUM 5000 UNITS: 5000 INJECTION INTRAVENOUS; SUBCUTANEOUS at 07:47

## 2024-05-09 RX ADMIN — LACTULOSE 10 G: 20 SOLUTION ORAL at 07:48

## 2024-05-09 RX ADMIN — OXYCODONE HYDROCHLORIDE 10 MG: 10 TABLET ORAL at 11:55

## 2024-05-09 RX ADMIN — HYDROXYZINE HYDROCHLORIDE 10 MG: 10 TABLET ORAL at 07:48

## 2024-05-09 RX ADMIN — SODIUM CHLORIDE, PRESERVATIVE FREE 10 ML: 5 INJECTION INTRAVENOUS at 07:49

## 2024-05-09 RX ADMIN — SODIUM CHLORIDE, PRESERVATIVE FREE 10 ML: 5 INJECTION INTRAVENOUS at 07:48

## 2024-05-09 ASSESSMENT — PAIN SCALES - GENERAL
PAINLEVEL_OUTOF10: 7
PAINLEVEL_OUTOF10: 7
PAINLEVEL_OUTOF10: 6
PAINLEVEL_OUTOF10: 0
PAINLEVEL_OUTOF10: 7

## 2024-05-09 ASSESSMENT — PAIN DESCRIPTION - DESCRIPTORS
DESCRIPTORS: ACHING

## 2024-05-09 ASSESSMENT — PAIN SCALES - WONG BAKER
WONGBAKER_NUMERICALRESPONSE: HURTS EVEN MORE

## 2024-05-09 ASSESSMENT — PAIN - FUNCTIONAL ASSESSMENT
PAIN_FUNCTIONAL_ASSESSMENT: PREVENTS OR INTERFERES SOME ACTIVE ACTIVITIES AND ADLS
PAIN_FUNCTIONAL_ASSESSMENT: PREVENTS OR INTERFERES WITH MANY ACTIVE NOT PASSIVE ACTIVITIES

## 2024-05-09 ASSESSMENT — PAIN DESCRIPTION - PAIN TYPE: TYPE: SURGICAL PAIN

## 2024-05-09 ASSESSMENT — PAIN DESCRIPTION - ORIENTATION
ORIENTATION: LEFT
ORIENTATION: MID

## 2024-05-09 ASSESSMENT — PAIN DESCRIPTION - LOCATION
LOCATION: HIP
LOCATION: HIP
LOCATION: LEG
LOCATION: HIP

## 2024-05-09 NOTE — PROGRESS NOTES
Occupational Therapy Treatment Note    Name: Breezy Hernández MRN: [id number] :   3/26/1931   Date:  2024   Admission Date: 2024 Room:  [address] [place], [place] [address]     Restrictions/Precautions:  TTWB L LE, General precautions, fall risk     Communication with other providers: RN approval of therapy, PTA cotx, OT present during session     Subjective:  Patient states:  \"Don't take my deng\"   Pain:   Location, Type, Intensity (0/10 to 10/10):  Pain noted in L upper leg, no rating given    Objective:    Observation: supine in bed upon arrival, agreeable to therapy   Objective Measures: 2L O2,  tele, vitals remained stable throughout     Treatment, including education:  Therapeutic Activity Training:   Therapeutic activity training was instructed today.  Cues were given for safety, sequence, UE/LE placement, awareness, and balance.    Activities performed today included bed mobility training, sup-sit, squat pivot transfer    Self Care Training:   Cues were given for safety, sequence, UE/LE placement, visual cues, and balance.    Activities performed today included grooming tasks, LB dressing    Adjust socks while lying semi garcia's position in bed with max A to reach feet. Supine to EOB with HOB elevated and use of bed features with max A x2 for trunk elevation, LE maneuvering, and hip pivot. Brush hair sitting EOB with Mod A for thoroughness, pt able to reach L/R proximal parts of hair, additional min A for trunk stability, mod v/c's for attention to task. Attempt squat pivot from EOB to chair  with max A x2 but transfer terminated d/t pt resisting transfers by holding onto bedrail and not releasing, pt returned EOB and hand placed on therapy in preparation for repeat transfer. Squat pivot transfer from EOB to chair with Max A x2 for force production, hip pivot, stabilization of LLE (to maintain WB precautions). Boost in chair with Max A x2 for force production.    Pt educated on role of OT, benefits of OT, and rationale

## 2024-05-09 NOTE — CARE COORDINATION
Pt on discharge.  CM set stretcher transportation with [facility] for 1630.    Pt RN Laurel updated  [facility]/Andrew petty

## 2024-05-09 NOTE — DISCHARGE INSTR - COC
Continuity of Care Form    Patient Name: Breezy Hernández   :  3/26/1931  MRN:  [id number]    Admit date:  2024  Discharge date:  2024    Code Status Order: Full Code   Advance Directives:     Admitting Physician:  Radha Vasquez MD  PCP: Sharmila Driscoll, APRN - NP    Discharging Nurse: Laurel BARRETT   Discharging Hospital Unit/Room#: 3123/3123-A  Discharging Unit Phone Number: [phone]    Emergency Contact:   Extended Emergency Contact Information  [facility]  Address: [address] [place], [place] [address] [facility]  Home Phone: [phone]  Mobile Phone: [phone]  Relation: Niece/Nephew  Secondary Emergency Contact: ALONDRA AYALA  Mobile Phone: [phone]  Relation: Niece/Nephew  Preferred language: English    Past Surgical History:  Past Surgical History:   Procedure Laterality Date    APPENDECTOMY      CHOLECYSTECTOMY      EYE SURGERY      FRACTURE SURGERY      HIP SURGERY Left 2024    HIP INTRAMEDULLARY NAIL BK INSERTION performed by Francisco Montilla DO at [facility]    HYSTERECTOMY (CERVIX STATUS UNKNOWN)      TONSILLECTOMY         Immunization History:   Immunization History   Administered Date(s) Administered    COVID-19, PFIZER PURPLE top, DILUTE for use, (age 12 y+), 30mcg/0.3mL 2021, 2021, 2021, 2022    Influenza, FLUAD, (age 65 y+), Adjuvanted, 0.5mL 10/22/2021    Influenza, FLUZONE (age 65 y+), High Dose, 0.7mL 10/14/2020    Influenza, High Dose (Fluzone 65 yrs and older) 10/02/2019       Active Problems:  Patient Active Problem List   Diagnosis Code    AMS (altered mental status) R41.82    Physical debility R53.81    Atrial fibrillation (HCC) I48.91    Atrial fibrillation with RVR (HCC) I48.91    Fracture T14.8XXA    Displaced fracture of left femoral neck (HCC) S72.002A       Isolation/Infection:   Isolation            No Isolation          Patient Infection Status       Infection Onset Added Last  Therapy:   - Oral Diet:  General    Routes of Feeding: Oral  Liquids: Thin Liquids  Daily Fluid Restriction: no  Last Modified Barium Swallow with Video (Video Swallowing Test): not done    Treatments at the Time of Hospital Discharge:   Respiratory Treatments:   Oxygen Therapy:  is on oxygen at 2 L/min per nasal cannula.  Ventilator:    - No ventilator support    Rehab Therapies: Physical Therapy and Occupational Therapy  Weight Bearing Status/Restrictions: No weight bearing restrictions  Other Medical Equipment (for information only, NOT a DME order):  wheelchair, cane, and walker  Other Treatments:     Patient's personal belongings (please select all that are sent with patient):  None, Glasses    RN SIGNATURE:  Electronically signed by Laurel Castro RN on 5/9/24 at 2:00 PM EDT    CASE MANAGEMENT/SOCIAL WORK SECTION    Inpatient Status Date: 5/9/2024    Readmission Risk Assessment Score:  Readmission Risk              Risk of Unplanned Readmission:  20           Discharging to Facility/ Agency   Name:   Address:  Phone:  Fax:    Dialysis Facility (if applicable)   Name:  Address:  Dialysis Schedule:  Phone:  Fax:    / signature: {Esignature:[id number]}    PHYSICIAN SECTION    Prognosis: Fair    Condition at Discharge: Stable    Rehab Potential (if transferring to Rehab): Guarded    Recommended Labs or Other Treatments After Discharge: CBC weekly    Physician Certification: I certify the above information and transfer of Breezy Hernández  is necessary for the continuing treatment of the diagnosis listed and that she requires Skilled Nursing Facility for greater 30 days.     Update Admission H&P: No change in H&P    PHYSICIAN SIGNATURE:  Electronically signed by Vida Chavez MD on 5/9/24 at 12:12 PM EDT

## 2024-05-09 NOTE — PLAN OF CARE
Problem: Discharge Planning  Goal: Discharge to home or other facility with appropriate resources  5/9/2024 0139 by Connor Vidal RN  Outcome: Progressing  5/8/2024 1641 by Taylor Nunez RN  Outcome: Progressing     Problem: Pain  Goal: Verbalizes/displays adequate comfort level or baseline comfort level  5/9/2024 0139 by Connor Vidal RN  Outcome: Progressing  5/8/2024 1641 by Taylor Nunez RN  Outcome: Progressing     Problem: Safety - Adult  Goal: Free from fall injury  5/9/2024 0139 by Connor Vidal RN  Outcome: Progressing  5/8/2024 1641 by Taylor Nunez RN  Outcome: Progressing     Problem: Skin/Tissue Integrity - Adult  Goal: Skin integrity remains intact  5/9/2024 0139 by Connor Vidal RN  Outcome: Progressing  5/8/2024 1641 by Taylor Nunez RN  Outcome: Progressing

## 2024-05-09 NOTE — DISCHARGE SUMMARY
V2.0  Discharge Summary    Name:  Breezy Hernández /Age/Sex: 3/26/1931 (89 y.o. female)   Admit Date: 2024  Discharge Date: 24    MRN & CSN:  [id number] & [id number] Encounter Date and Time 24 1:41 PM EDT    Attending:  Vida Chavez MD Discharging Provider: Vida Chavez MD       Hospital Course:     Brief HPI: Leah states that patient fell around 3:30 AM today. He states she heard a fall when patient was going to the bathroom. She had been using her walker this morning. They put patient in recliner and she felt that her left hip was hurting. They decided to bring her to the ER. Leah states that for the last few days heart rate has been fluctuating initially it was running low earlier in the week but slowly started rising. Patient did not complain of any lightheadedness or dizziness. No chest pain. No trouble breathing. She does have a history of having more mechanical falls recently.     Brief Problem Based Course:   Left femoral neck fracture-x-ray of left hip shows acute fracture of the base of the left femoral neck with displacement.  Appears to have been a mechanical fall.  Orthopedic surgery following.  Patient underwent intramedullary nail abe insertion for the left femoral neck fracture POD 4, hemoglobin downtrending,   Acute anemia: No evidence of blood loss hemoglobin 7.1 orthopedic surgery cleared patient for discharge surgical site healing well no evidence of bleeding from there is well, folate borderline low will start on folic acid supplementation advised patient's niece to get CBC checked within the week.    A-fib with RVR-likely due to combination of pain from hip fracture and patient not receiving her home medications today.  Restart home meds of diltiazem and metoprolol.  Cardizem drip was initiated that heart rate control-off of drip. HR controlled on metoprolol 12.5 mg twice daily. Eliquis was held for surgery and was given a dose of Lovenox 1 Mg per KG on the day of admission.       dilTIAZem 180 MG extended release capsule  Commonly known as: CARDIZEM CD     Eliquis 2.5 MG Tabs tablet  Generic drug: apixaban     ipratropium 0.5 mg-albuterol 2.5 mg 0.5-2.5 (3) MG/3ML Soln nebulizer solution  Commonly known as: DUONEB               Where to Get Your Medications        These medications were sent to [id number] - [place], [place] - [address] - P [phone] - F [phone]  [address] [facility] [address] [facility] [address]      Phone: [phone]   aspirin 81 MG chewable tablet  clopidogrel 75 MG tablet  folic acid 1 MG tablet  furosemide 20 MG tablet        Objective Findings at Discharge:   /82   Pulse (!) 48   Temp 97.9 °F (36.6 °C) (Oral)   Resp 27   Ht 1.499 m (4' 11\")   Wt 49.8 kg (109 lb 12.8 oz)   SpO2 99%   BMI 22.18 kg/m²       Physical Exam:   Physical Exam         Labs and Imaging   FL LESS THAN 1 HOUR    Result Date: 5/5/2024  Radiology exam is complete. No Radiologist dictation. Please follow up with ordering provider.     CT HIP LEFT WO CONTRAST    Result Date: 5/4/2024  Reason: Reactive planning, hip pain CT hip left without contrast TECHNIQUE: Collimated helical images are made through the left hip in a bone algorithm. Up-to-date CT equipment and radiation dose reduction techniques were employed. FINDINGS: Within the anterior pelvis a large 14 x 17 cm cystic lesion with septations. Diverticula identified along the sigmoid colon. A comminuted intratrochanteric fracture the proximal left femur is identified. Femoral heads located within acetabulum. Sacral ala place.     1. 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 2. Comminuted intratrochanteric fracture of the proximal left femur. Femoral head located within acetabulum. 3. Sigmoid diverticulosis Electronically signed by MD Sumeet Nunn    XR HIP 2-3 VW W PELVIS LEFT    Result Date: 5/4/2024  AP PELVIS AND LEFT HIP: HISTORY: Left hip pain. TECHNIQUE: AP view of the pelvis and

## 2024-05-09 NOTE — PROGRESS NOTES
Physical Therapy Treatment Note  Name: Brezey Hernández MRN: [id number] :   3/26/1931   Date:  2024   Admission Date: 2024 Room:  [address] [place] [address]   Restrictions/Precautions: TTWB LLE, fall risk, gen prec   Communication with other providers:  pt okay to see for therapy per RN, CoTx with SOT Vee and OT Natalia supervising.   Subjective:  Patient states:  \"You crazy little girl\"   Pain:   Location, Type, Intensity (0/10 to 10/10):  Endorses pn at hip, does not rate.   Objective:    Observation:  Pt supine in bed upon therapy arrival.   Objective Measures:  Tele, stable  Treatment, including education/measures:    Therapeutic Activity Training:   Therapeutic activity training was instructed today.  Cues were given for safety, sequence, UE/LE placement, awareness, and balance.    Activities performed today included bed mobility training, sup-sit, sit-stand, SPT.    Mobility:  Sup > sit: Max A x 2 to EOB, attempt to cue for LE advancement and pt is unable to initiate possibly dt cognition. Increased time at EOB for balance and upright tolerance.   Scooting: Mod A at hips, Min A at trunk.   SPT: First attempt SPT unsuccessful dt sudden hand placement on bed rail by pt. Max A x 1 therapy hug from EOB > recliner and Mod A at L LE to elevate off of floor.     Attempt to initiate LE strengthening and pt is unable to initiate despite visual demo, VCs , and TCs.    Safety:   Pt returned safely to recliner with chair alarm activated, call light in reach, all needs met.   Assessment / Impression:    Pt tolerated OOB activities well this date but continues to demo difficulty understanding WB precautions and unable to initiate LE strength.   Patient's tolerance of treatment:  Good   Adverse Reaction: none  Significant change in status and impact:  none  Barriers to improvement:  Cognition  Plan for Next Session:    Plan to continue OOB activities.   Time in:  1114  Time out:  1139  Timed treatment minutes:  23  Total  treatment time:  25    Previously filed items:           Short Term Goals  Time Frame for Short Term Goals: 1 week  Short Term Goal 1: pt to complete all bed mobility min A  Short Term Goal 2: pt to complete all STS transfers to/from bed, commode, and chair min A  Short Term Goal 3: pt to ambulate 15' with LRAD min A    Electronically signed by:    Maggie Sparks, SALONI  5/9/2024, 2:50 PM

## 2024-05-12 ENCOUNTER — HOSPITAL ENCOUNTER (EMERGENCY)
Age: 89
Discharge: ANOTHER HOSPITAL | End: 2024-05-12
Attending: STUDENT IN AN ORGANIZED HEALTH CARE EDUCATION/TRAINING PROGRAM
Payer: MEDICARE

## 2024-05-12 ENCOUNTER — APPOINTMENT (OUTPATIENT)
Dept: CT IMAGING | Age: 89
End: 2024-05-12
Payer: MEDICARE

## 2024-05-12 ENCOUNTER — HOSPITAL ENCOUNTER (INPATIENT)
Age: 89
LOS: 5 days | Discharge: HOSPICE | End: 2024-05-17
Attending: INTERNAL MEDICINE | Admitting: INTERNAL MEDICINE
Payer: MEDICARE

## 2024-05-12 ENCOUNTER — APPOINTMENT (OUTPATIENT)
Dept: GENERAL RADIOLOGY | Age: 89
End: 2024-05-12
Payer: MEDICARE

## 2024-05-12 VITALS
OXYGEN SATURATION: 100 % | RESPIRATION RATE: 18 BRPM | SYSTOLIC BLOOD PRESSURE: 119 MMHG | HEART RATE: 119 BPM | BODY MASS INDEX: 21.97 KG/M2 | WEIGHT: 109 LBS | HEIGHT: 59 IN | TEMPERATURE: 97.7 F | DIASTOLIC BLOOD PRESSURE: 80 MMHG

## 2024-05-12 DIAGNOSIS — N30.01 ACUTE CYSTITIS WITH HEMATURIA: ICD-10-CM

## 2024-05-12 DIAGNOSIS — L02.91 ABSCESS: ICD-10-CM

## 2024-05-12 DIAGNOSIS — A41.9 SEPTICEMIA (HCC): ICD-10-CM

## 2024-05-12 DIAGNOSIS — R41.82 ALTERED MENTAL STATUS, UNSPECIFIED ALTERED MENTAL STATUS TYPE: Primary | ICD-10-CM

## 2024-05-12 DIAGNOSIS — I48.91 ATRIAL FIBRILLATION, UNSPECIFIED TYPE (HCC): ICD-10-CM

## 2024-05-12 DIAGNOSIS — N83.8 OVARIAN MASS: ICD-10-CM

## 2024-05-12 LAB
ALBUMIN SERPL-MCNC: 2.6 GM/DL (ref 3.4–5)
ALBUMIN SERPL-MCNC: 2.9 GM/DL (ref 3.4–5)
ALP BLD-CCNC: 100 IU/L (ref 40–129)
ALP BLD-CCNC: 111 IU/L (ref 40–129)
ALT SERPL-CCNC: 6 U/L (ref 10–40)
ALT SERPL-CCNC: 8 U/L (ref 10–40)
ANION GAP SERPL CALCULATED.3IONS-SCNC: 7 MMOL/L (ref 7–16)
ANION GAP SERPL CALCULATED.3IONS-SCNC: 8 MMOL/L (ref 7–16)
AST SERPL-CCNC: 20 IU/L (ref 15–37)
AST SERPL-CCNC: 22 IU/L (ref 15–37)
BACTERIA: ABNORMAL /HPF
BASOPHILS ABSOLUTE: 0 K/CU MM
BASOPHILS ABSOLUTE: 0.1 K/CU MM
BASOPHILS RELATIVE PERCENT: 0.3 % (ref 0–1)
BASOPHILS RELATIVE PERCENT: 0.4 % (ref 0–1)
BILIRUB SERPL-MCNC: 0.8 MG/DL (ref 0–1)
BILIRUB SERPL-MCNC: 0.8 MG/DL (ref 0–1)
BILIRUBIN, URINE: NEGATIVE MG/DL
BLOOD, URINE: ABNORMAL
BUN SERPL-MCNC: 15 MG/DL (ref 6–23)
BUN SERPL-MCNC: 17 MG/DL (ref 6–23)
CALCIUM SERPL-MCNC: 8 MG/DL (ref 8.3–10.6)
CALCIUM SERPL-MCNC: 8.8 MG/DL (ref 8.3–10.6)
CAST TYPE: ABNORMAL /HPF
CHLORIDE BLD-SCNC: 106 MMOL/L (ref 99–110)
CHLORIDE BLD-SCNC: 107 MMOL/L (ref 99–110)
CLARITY: CLEAR
CO2: 25 MMOL/L (ref 21–32)
CO2: 26 MMOL/L (ref 21–32)
COLOR: YELLOW
CREAT SERPL-MCNC: 0.9 MG/DL (ref 0.6–1.1)
CREAT SERPL-MCNC: 0.9 MG/DL (ref 0.6–1.1)
CRYSTAL TYPE: NEGATIVE /HPF
DIFFERENTIAL TYPE: ABNORMAL
DIFFERENTIAL TYPE: ABNORMAL
EKG ATRIAL RATE: 136 BPM
EKG DIAGNOSIS: NORMAL
EKG Q-T INTERVAL: 314 MS
EKG QRS DURATION: 104 MS
EKG QTC CALCULATION (BAZETT): 489 MS
EKG R AXIS: -52 DEGREES
EKG T AXIS: 121 DEGREES
EKG VENTRICULAR RATE: 146 BPM
EOSINOPHILS ABSOLUTE: 0.5 K/CU MM
EOSINOPHILS ABSOLUTE: 0.5 K/CU MM
EOSINOPHILS RELATIVE PERCENT: 3.7 % (ref 0–3)
EOSINOPHILS RELATIVE PERCENT: 3.8 % (ref 0–3)
EPITHELIAL CELLS, UA: 5 /HPF
GFR, ESTIMATED: 60 ML/MIN/1.73M2
GFR, ESTIMATED: 60 ML/MIN/1.73M2
GLUCOSE BLD-MCNC: 126 MG/DL (ref 70–99)
GLUCOSE SERPL-MCNC: 108 MG/DL (ref 70–99)
GLUCOSE SERPL-MCNC: 115 MG/DL (ref 70–99)
GLUCOSE URINE: NEGATIVE MG/DL
HCT VFR BLD CALC: 28.7 % (ref 37–47)
HCT VFR BLD CALC: 28.8 % (ref 37–47)
HEMOGLOBIN: 8.6 GM/DL (ref 12.5–16)
HEMOGLOBIN: 8.8 GM/DL (ref 12.5–16)
IMMATURE NEUTROPHIL %: 1.4 % (ref 0–0.43)
IMMATURE NEUTROPHIL %: 1.5 % (ref 0–0.43)
INR BLD: 1.1 INDEX
KETONES, URINE: NEGATIVE MG/DL
LACTIC ACID, SEPSIS: 1.4 MMOL/L (ref 0.4–2)
LEUKOCYTE ESTERASE, URINE: ABNORMAL
LYMPHOCYTES ABSOLUTE: 2.3 K/CU MM
LYMPHOCYTES ABSOLUTE: 2.8 K/CU MM
LYMPHOCYTES RELATIVE PERCENT: 17 % (ref 24–44)
LYMPHOCYTES RELATIVE PERCENT: 20.6 % (ref 24–44)
MAGNESIUM: 1.8 MG/DL (ref 1.8–2.4)
MCH RBC QN AUTO: 30.2 PG (ref 27–31)
MCH RBC QN AUTO: 30.5 PG (ref 27–31)
MCHC RBC AUTO-ENTMCNC: 30 % (ref 32–36)
MCHC RBC AUTO-ENTMCNC: 30.6 % (ref 32–36)
MCV RBC AUTO: 101.8 FL (ref 78–100)
MCV RBC AUTO: 99 FL (ref 78–100)
MONOCYTES ABSOLUTE: 1.2 K/CU MM
MONOCYTES ABSOLUTE: 1.3 K/CU MM
MONOCYTES RELATIVE PERCENT: 8.7 % (ref 0–4)
MONOCYTES RELATIVE PERCENT: 9.2 % (ref 0–4)
NEUTROPHILS ABSOLUTE: 8.8 K/CU MM
NEUTROPHILS ABSOLUTE: 9.4 K/CU MM
NEUTROPHILS RELATIVE PERCENT: 65 % (ref 36–66)
NEUTROPHILS RELATIVE PERCENT: 68.4 % (ref 36–66)
NITRITE URINE, QUANTITATIVE: POSITIVE
NUCLEATED RBC %: 0 %
PDW BLD-RTO: 15.8 % (ref 11.7–14.9)
PDW BLD-RTO: 16.1 % (ref 11.7–14.9)
PH, URINE: 8 (ref 5–8)
PLATELET # BLD: 336 K/CU MM (ref 140–440)
PLATELET # BLD: 339 K/CU MM (ref 140–440)
PMV BLD AUTO: 9.2 FL (ref 7.5–11.1)
PMV BLD AUTO: 9.4 FL (ref 7.5–11.1)
POTASSIUM SERPL-SCNC: 4.5 MMOL/L (ref 3.5–5.1)
POTASSIUM SERPL-SCNC: 4.8 MMOL/L (ref 3.5–5.1)
PROTEIN UA: NEGATIVE MG/DL
PROTHROMBIN TIME: 14.6 SECONDS (ref 11.7–14.5)
RBC # BLD: 2.82 M/CU MM (ref 4.2–5.4)
RBC # BLD: 2.91 M/CU MM (ref 4.2–5.4)
RBC URINE: 2 /HPF (ref 0–6)
SODIUM BLD-SCNC: 138 MMOL/L (ref 135–145)
SODIUM BLD-SCNC: 141 MMOL/L (ref 135–145)
SPECIFIC GRAVITY UA: 1.01 (ref 1–1.03)
TOTAL IMMATURE NEUTOROPHIL: 0.19 K/CU MM
TOTAL IMMATURE NEUTOROPHIL: 0.21 K/CU MM
TOTAL NUCLEATED RBC: 0 K/CU MM
TOTAL PROTEIN: 5.2 GM/DL (ref 6.4–8.2)
TOTAL PROTEIN: 5.3 GM/DL (ref 6.4–8.2)
TSH SERPL DL<=0.005 MIU/L-ACNC: 3.28 UIU/ML (ref 0.27–4.2)
UROBILINOGEN, URINE: 0.2 MG/DL (ref 0.2–1)
WBC # BLD: 13.6 K/CU MM (ref 4–10.5)
WBC # BLD: 13.7 K/CU MM (ref 4–10.5)
WBC UA: 10 /HPF (ref 0–5)

## 2024-05-12 PROCEDURE — 87186 SC STD MICRODIL/AGAR DIL: CPT

## 2024-05-12 PROCEDURE — 6360000002 HC RX W HCPCS: Performed by: STUDENT IN AN ORGANIZED HEALTH CARE EDUCATION/TRAINING PROGRAM

## 2024-05-12 PROCEDURE — 80053 COMPREHEN METABOLIC PANEL: CPT

## 2024-05-12 PROCEDURE — 84443 ASSAY THYROID STIM HORMONE: CPT

## 2024-05-12 PROCEDURE — 81001 URINALYSIS AUTO W/SCOPE: CPT

## 2024-05-12 PROCEDURE — 6360000004 HC RX CONTRAST MEDICATION: Performed by: STUDENT IN AN ORGANIZED HEALTH CARE EDUCATION/TRAINING PROGRAM

## 2024-05-12 PROCEDURE — 82962 GLUCOSE BLOOD TEST: CPT

## 2024-05-12 PROCEDURE — 87077 CULTURE AEROBIC IDENTIFY: CPT

## 2024-05-12 PROCEDURE — 99285 EMERGENCY DEPT VISIT HI MDM: CPT

## 2024-05-12 PROCEDURE — 2140000000 HC CCU INTERMEDIATE R&B

## 2024-05-12 PROCEDURE — 71045 X-RAY EXAM CHEST 1 VIEW: CPT

## 2024-05-12 PROCEDURE — 2580000003 HC RX 258: Performed by: STUDENT IN AN ORGANIZED HEALTH CARE EDUCATION/TRAINING PROGRAM

## 2024-05-12 PROCEDURE — 87040 BLOOD CULTURE FOR BACTERIA: CPT

## 2024-05-12 PROCEDURE — 85025 COMPLETE CBC W/AUTO DIFF WBC: CPT

## 2024-05-12 PROCEDURE — 96367 TX/PROPH/DG ADDL SEQ IV INF: CPT

## 2024-05-12 PROCEDURE — 85610 PROTHROMBIN TIME: CPT

## 2024-05-12 PROCEDURE — 73701 CT LOWER EXTREMITY W/DYE: CPT

## 2024-05-12 PROCEDURE — 96361 HYDRATE IV INFUSION ADD-ON: CPT

## 2024-05-12 PROCEDURE — 84145 PROCALCITONIN (PCT): CPT

## 2024-05-12 PROCEDURE — 83605 ASSAY OF LACTIC ACID: CPT

## 2024-05-12 PROCEDURE — 96365 THER/PROPH/DIAG IV INF INIT: CPT

## 2024-05-12 PROCEDURE — 93010 ELECTROCARDIOGRAM REPORT: CPT | Performed by: INTERNAL MEDICINE

## 2024-05-12 PROCEDURE — 83735 ASSAY OF MAGNESIUM: CPT

## 2024-05-12 PROCEDURE — 2500000003 HC RX 250 WO HCPCS: Performed by: STUDENT IN AN ORGANIZED HEALTH CARE EDUCATION/TRAINING PROGRAM

## 2024-05-12 PROCEDURE — 93005 ELECTROCARDIOGRAM TRACING: CPT | Performed by: STUDENT IN AN ORGANIZED HEALTH CARE EDUCATION/TRAINING PROGRAM

## 2024-05-12 PROCEDURE — 6370000000 HC RX 637 (ALT 250 FOR IP): Performed by: STUDENT IN AN ORGANIZED HEALTH CARE EDUCATION/TRAINING PROGRAM

## 2024-05-12 PROCEDURE — 87086 URINE CULTURE/COLONY COUNT: CPT

## 2024-05-12 PROCEDURE — 96366 THER/PROPH/DIAG IV INF ADDON: CPT

## 2024-05-12 RX ORDER — SODIUM CHLORIDE, SODIUM LACTATE, POTASSIUM CHLORIDE, AND CALCIUM CHLORIDE .6; .31; .03; .02 G/100ML; G/100ML; G/100ML; G/100ML
500 INJECTION, SOLUTION INTRAVENOUS ONCE
Status: COMPLETED | OUTPATIENT
Start: 2024-05-12 | End: 2024-05-12

## 2024-05-12 RX ORDER — MAGNESIUM SULFATE IN WATER 40 MG/ML
2000 INJECTION, SOLUTION INTRAVENOUS ONCE
Status: COMPLETED | OUTPATIENT
Start: 2024-05-12 | End: 2024-05-13

## 2024-05-12 RX ORDER — HEPARIN SODIUM 5000 [USP'U]/ML
5000 INJECTION, SOLUTION INTRAVENOUS; SUBCUTANEOUS EVERY 8 HOURS SCHEDULED
Status: DISCONTINUED | OUTPATIENT
Start: 2024-05-12 | End: 2024-05-17

## 2024-05-12 RX ORDER — FUROSEMIDE 20 MG/1
20 TABLET ORAL EVERY OTHER DAY
Status: DISCONTINUED | OUTPATIENT
Start: 2024-05-13 | End: 2024-05-17 | Stop reason: HOSPADM

## 2024-05-12 RX ORDER — DILTIAZEM HYDROCHLORIDE 5 MG/ML
5 INJECTION INTRAVENOUS ONCE
Status: COMPLETED | OUTPATIENT
Start: 2024-05-12 | End: 2024-05-12

## 2024-05-12 RX ORDER — CLOPIDOGREL BISULFATE 75 MG/1
75 TABLET ORAL DAILY
Status: DISCONTINUED | OUTPATIENT
Start: 2024-05-12 | End: 2024-05-17 | Stop reason: HOSPADM

## 2024-05-12 RX ORDER — ASPIRIN 81 MG/1
81 TABLET, CHEWABLE ORAL DAILY
Status: DISCONTINUED | OUTPATIENT
Start: 2024-05-12 | End: 2024-05-17 | Stop reason: HOSPADM

## 2024-05-12 RX ORDER — SODIUM CHLORIDE, SODIUM LACTATE, POTASSIUM CHLORIDE, CALCIUM CHLORIDE 600; 310; 30; 20 MG/100ML; MG/100ML; MG/100ML; MG/100ML
1000 INJECTION, SOLUTION INTRAVENOUS ONCE
Status: COMPLETED | OUTPATIENT
Start: 2024-05-12 | End: 2024-05-12

## 2024-05-12 RX ORDER — ACETAMINOPHEN 325 MG/1
650 TABLET ORAL EVERY 6 HOURS PRN
Status: DISCONTINUED | OUTPATIENT
Start: 2024-05-12 | End: 2024-05-17 | Stop reason: HOSPADM

## 2024-05-12 RX ORDER — FOLIC ACID 1 MG/1
1 TABLET ORAL DAILY
Status: DISCONTINUED | OUTPATIENT
Start: 2024-05-12 | End: 2024-05-17 | Stop reason: HOSPADM

## 2024-05-12 RX ORDER — DILTIAZEM HYDROCHLORIDE 5 MG/ML
10 INJECTION INTRAVENOUS ONCE
Status: DISCONTINUED | OUTPATIENT
Start: 2024-05-12 | End: 2024-05-12

## 2024-05-12 RX ORDER — VANCOMYCIN 1.5 G/300ML
1500 INJECTION, SOLUTION INTRAVENOUS ONCE
Status: COMPLETED | OUTPATIENT
Start: 2024-05-12 | End: 2024-05-12

## 2024-05-12 RX ORDER — DILTIAZEM HYDROCHLORIDE 120 MG/1
240 CAPSULE, COATED, EXTENDED RELEASE ORAL DAILY
Status: DISCONTINUED | OUTPATIENT
Start: 2024-05-12 | End: 2024-05-15

## 2024-05-12 RX ORDER — ONDANSETRON 2 MG/ML
4 INJECTION INTRAMUSCULAR; INTRAVENOUS EVERY 6 HOURS PRN
Status: DISCONTINUED | OUTPATIENT
Start: 2024-05-12 | End: 2024-05-17 | Stop reason: HOSPADM

## 2024-05-12 RX ORDER — HYDROXYZINE HYDROCHLORIDE 10 MG/1
10 TABLET, FILM COATED ORAL 3 TIMES DAILY
Status: DISCONTINUED | OUTPATIENT
Start: 2024-05-12 | End: 2024-05-12

## 2024-05-12 RX ORDER — ACETAMINOPHEN 650 MG/1
650 SUPPOSITORY RECTAL EVERY 6 HOURS PRN
Status: DISCONTINUED | OUTPATIENT
Start: 2024-05-12 | End: 2024-05-17 | Stop reason: HOSPADM

## 2024-05-12 RX ORDER — LEVOTHYROXINE SODIUM 0.07 MG/1
75 TABLET ORAL DAILY
Status: DISCONTINUED | OUTPATIENT
Start: 2024-05-13 | End: 2024-05-17 | Stop reason: HOSPADM

## 2024-05-12 RX ORDER — PANTOPRAZOLE SODIUM 20 MG/1
20 TABLET, DELAYED RELEASE ORAL
Status: DISCONTINUED | OUTPATIENT
Start: 2024-05-13 | End: 2024-05-17 | Stop reason: HOSPADM

## 2024-05-12 RX ORDER — ONDANSETRON 4 MG/1
4 TABLET, ORALLY DISINTEGRATING ORAL EVERY 8 HOURS PRN
Status: DISCONTINUED | OUTPATIENT
Start: 2024-05-12 | End: 2024-05-17 | Stop reason: HOSPADM

## 2024-05-12 RX ADMIN — CEFTRIAXONE SODIUM 2000 MG: 2 INJECTION, POWDER, FOR SOLUTION INTRAMUSCULAR; INTRAVENOUS at 23:33

## 2024-05-12 RX ADMIN — SODIUM CHLORIDE, POTASSIUM CHLORIDE, SODIUM LACTATE AND CALCIUM CHLORIDE 250 ML: 600; 310; 30; 20 INJECTION, SOLUTION INTRAVENOUS at 23:24

## 2024-05-12 RX ADMIN — METOPROLOL TARTRATE 12.5 MG: 25 TABLET, FILM COATED ORAL at 23:25

## 2024-05-12 RX ADMIN — IOPAMIDOL 100 ML: 755 INJECTION, SOLUTION INTRAVENOUS at 14:27

## 2024-05-12 RX ADMIN — CLOPIDOGREL BISULFATE 75 MG: 75 TABLET ORAL at 23:25

## 2024-05-12 RX ADMIN — FOLIC ACID 1 MG: 1 TABLET ORAL at 23:25

## 2024-05-12 RX ADMIN — VANCOMYCIN 1500 MG: 1.5 INJECTION, SOLUTION INTRAVENOUS at 16:31

## 2024-05-12 RX ADMIN — DILTIAZEM HYDROCHLORIDE 5 MG: 5 INJECTION, SOLUTION INTRAVENOUS at 23:24

## 2024-05-12 RX ADMIN — ASPIRIN 81 MG: 81 TABLET, CHEWABLE ORAL at 23:25

## 2024-05-12 RX ADMIN — ACETAMINOPHEN 650 MG: 325 TABLET ORAL at 23:24

## 2024-05-12 RX ADMIN — SODIUM CHLORIDE, POTASSIUM CHLORIDE, SODIUM LACTATE AND CALCIUM CHLORIDE 500 ML: 600; 310; 30; 20 INJECTION, SOLUTION INTRAVENOUS at 16:20

## 2024-05-12 RX ADMIN — PIPERACILLIN AND TAZOBACTAM 3375 MG: 3; .375 INJECTION, POWDER, LYOPHILIZED, FOR SOLUTION INTRAVENOUS at 16:00

## 2024-05-12 RX ADMIN — DILTIAZEM HYDROCHLORIDE 240 MG: 120 CAPSULE, EXTENDED RELEASE ORAL at 23:25

## 2024-05-12 RX ADMIN — SODIUM CHLORIDE, POTASSIUM CHLORIDE, SODIUM LACTATE AND CALCIUM CHLORIDE 1000 ML: 600; 310; 30; 20 INJECTION, SOLUTION INTRAVENOUS at 13:36

## 2024-05-12 RX ADMIN — HEPARIN SODIUM 5000 UNITS: 5000 INJECTION INTRAVENOUS; SUBCUTANEOUS at 23:24

## 2024-05-12 ASSESSMENT — PAIN DESCRIPTION - LOCATION: LOCATION: HIP

## 2024-05-12 ASSESSMENT — PAIN DESCRIPTION - ORIENTATION: ORIENTATION: LEFT

## 2024-05-12 ASSESSMENT — PAIN - FUNCTIONAL ASSESSMENT: PAIN_FUNCTIONAL_ASSESSMENT: PREVENTS OR INTERFERES SOME ACTIVE ACTIVITIES AND ADLS

## 2024-05-12 ASSESSMENT — LIFESTYLE VARIABLES
HOW MANY STANDARD DRINKS CONTAINING ALCOHOL DO YOU HAVE ON A TYPICAL DAY: PATIENT DOES NOT DRINK
HOW OFTEN DO YOU HAVE A DRINK CONTAINING ALCOHOL: NEVER

## 2024-05-12 ASSESSMENT — PAIN DESCRIPTION - DESCRIPTORS: DESCRIPTORS: ACHING

## 2024-05-12 ASSESSMENT — PAIN SCALES - GENERAL: PAINLEVEL_OUTOF10: 3

## 2024-05-12 NOTE — PROGRESS NOTES
# Sepsis secondary to possible left hip surgical site abscess and UTI  # Possible left hip surgical site abscess  # UTI  -Patient presented with altered mental status  -Discharged recently from [facility] after sustaining left hip fracture and undergoing intramedullary nail abe insertion  -SIRS criteria: Heart rate greater than 90, respiratory rate greater than 22, WBCs greater than 12  -UA positive for nitrites, small leukocyte esterase  -Received Vanco and Zosyn at outside ED as well as IV fluids  Ortho consulted, appreciate recs  Continue Vanco and Zosyn  Follow-up on urine culture  Wound culture ordered  MRSA screen  Follow-up on blood cultures open    # HFmrEF***  -Last echo from 1/2023 showing EF 45 to 50%, grade 2 diastolic dysfunction, global hypokinesis, moderate AR, mild TR, trace TN, mild to moderate MR  -Chest x-ray showing mild pulmonary vascular congestion  Holding Lasix in the setting of sepsis  Continue metoprolol  Continue aspirin  Echo ordered***    # Acute anemia  -Hemoglobin is improving  -Hemoglobin 8.6 from 7.1 on 5/9  Continue to monitor    # Atrial fibrillation  -Eliquis discontinued due to acute anemia  Continue aspirin, Plavix, diltiazem and metoprolol    # CKD stage III  -Creatinine 0.9, baseline  Continue to monitor  Avoid nephrotoxic agents    # Hypothyroidism  Continue levothyroxine

## 2024-05-12 NOTE — ED PROVIDER NOTES
Hazel Sears MD   650 mg at 05/13/24 1741    Or    acetaminophen (TYLENOL) suppository 650 mg  650 mg Rectal Q6H PRN Hazel Sears MD        aspirin chewable tablet 81 mg  81 mg Oral Daily Hazel Sears MD   81 mg at 05/13/24 0824    clopidogrel (PLAVIX) tablet 75 mg  75 mg Oral Daily Hazel Sears MD   75 mg at 05/13/24 0824    dilTIAZem (CARDIZEM CD) extended release capsule 240 mg  240 mg Oral Daily Hazel Sears MD   240 mg at 05/14/24 0809    folic acid (FOLVITE) tablet 1 mg  1 mg Oral Daily Hazel Sears MD   1 mg at 05/13/24 0824    furosemide (LASIX) tablet 20 mg  20 mg Oral Every Other Day Hazel Sears MD   20 mg at 05/13/24 0824    levothyroxine (SYNTHROID) tablet 75 mcg  75 mcg Oral Daily Hazel Sears MD   75 mcg at 05/14/24 0558    pantoprazole (PROTONIX) tablet 20 mg  20 mg Oral QAM AC Hazel Sears MD   20 mg at 05/14/24 0558    heparin (porcine) injection 5,000 Units  5,000 Units SubCUTAneous 3 times per day Hazel Sears MD   5,000 Units at 05/14/24 0457     No Known Allergies    Nursing Notes Reviewed    Physical Exam:  Triage VS:    ED Triage Vitals   Enc Vitals Group      BP       Pulse       Resp       Temp       Temp src       SpO2       Weight       Height       Head Circumference       Peak Flow       Pain Score       Pain Loc       Pain Edu?       Excl. in GC?        My pulse ox interpretation is - normal  Physical Exam  Vitals and nursing note reviewed. Exam conducted with a chaperone present.   Constitutional:       General: She is not in acute distress.     Appearance: Normal appearance. She is not ill-appearing.   HENT:      Mouth/Throat:      Mouth: Mucous membranes are dry.      Pharynx: No oropharyngeal exudate or posterior oropharyngeal erythema.   Eyes:      Conjunctiva/sclera: Conjunctivae normal.   Cardiovascular:      Rate and Rhythm: Tachycardia present. Rhythm irregular.      Pulses: Normal pulses.      Heart sounds: Normal heart

## 2024-05-13 LAB
EKG DIAGNOSIS: NORMAL
EKG Q-T INTERVAL: 286 MS
EKG QRS DURATION: 106 MS
EKG QTC CALCULATION (BAZETT): 456 MS
EKG R AXIS: -45 DEGREES
EKG T AXIS: 131 DEGREES
EKG VENTRICULAR RATE: 153 BPM
PROCALCITONIN SERPL-MCNC: 0.14 NG/ML

## 2024-05-13 PROCEDURE — 93010 ELECTROCARDIOGRAM REPORT: CPT | Performed by: INTERNAL MEDICINE

## 2024-05-13 PROCEDURE — 2140000000 HC CCU INTERMEDIATE R&B

## 2024-05-13 PROCEDURE — 2580000003 HC RX 258: Performed by: STUDENT IN AN ORGANIZED HEALTH CARE EDUCATION/TRAINING PROGRAM

## 2024-05-13 PROCEDURE — 94761 N-INVAS EAR/PLS OXIMETRY MLT: CPT

## 2024-05-13 PROCEDURE — 2700000000 HC OXYGEN THERAPY PER DAY

## 2024-05-13 PROCEDURE — 6360000002 HC RX W HCPCS: Performed by: STUDENT IN AN ORGANIZED HEALTH CARE EDUCATION/TRAINING PROGRAM

## 2024-05-13 PROCEDURE — 6370000000 HC RX 637 (ALT 250 FOR IP): Performed by: STUDENT IN AN ORGANIZED HEALTH CARE EDUCATION/TRAINING PROGRAM

## 2024-05-13 RX ORDER — SODIUM CHLORIDE, SODIUM LACTATE, POTASSIUM CHLORIDE, CALCIUM CHLORIDE 600; 310; 30; 20 MG/100ML; MG/100ML; MG/100ML; MG/100ML
INJECTION, SOLUTION INTRAVENOUS CONTINUOUS
Status: DISPENSED | OUTPATIENT
Start: 2024-05-13 | End: 2024-05-15

## 2024-05-13 RX ADMIN — FOLIC ACID 1 MG: 1 TABLET ORAL at 08:24

## 2024-05-13 RX ADMIN — VANCOMYCIN HYDROCHLORIDE 500 MG: 500 INJECTION, POWDER, LYOPHILIZED, FOR SOLUTION INTRAVENOUS at 18:19

## 2024-05-13 RX ADMIN — ACETAMINOPHEN 650 MG: 325 TABLET ORAL at 17:41

## 2024-05-13 RX ADMIN — SODIUM CHLORIDE, POTASSIUM CHLORIDE, SODIUM LACTATE AND CALCIUM CHLORIDE: 600; 310; 30; 20 INJECTION, SOLUTION INTRAVENOUS at 08:16

## 2024-05-13 RX ADMIN — CEFEPIME 2000 MG: 2 INJECTION, POWDER, FOR SOLUTION INTRAVENOUS at 08:32

## 2024-05-13 RX ADMIN — SODIUM CHLORIDE, POTASSIUM CHLORIDE, SODIUM LACTATE AND CALCIUM CHLORIDE: 600; 310; 30; 20 INJECTION, SOLUTION INTRAVENOUS at 15:54

## 2024-05-13 RX ADMIN — HEPARIN SODIUM 5000 UNITS: 5000 INJECTION INTRAVENOUS; SUBCUTANEOUS at 20:46

## 2024-05-13 RX ADMIN — LEVOTHYROXINE SODIUM 75 MCG: 0.07 TABLET ORAL at 07:02

## 2024-05-13 RX ADMIN — PANTOPRAZOLE SODIUM 20 MG: 20 TABLET, DELAYED RELEASE ORAL at 07:02

## 2024-05-13 RX ADMIN — HEPARIN SODIUM 5000 UNITS: 5000 INJECTION INTRAVENOUS; SUBCUTANEOUS at 15:46

## 2024-05-13 RX ADMIN — ASPIRIN 81 MG: 81 TABLET, CHEWABLE ORAL at 08:24

## 2024-05-13 RX ADMIN — MAGNESIUM SULFATE HEPTAHYDRATE 2000 MG: 40 INJECTION, SOLUTION INTRAVENOUS at 00:02

## 2024-05-13 RX ADMIN — CLOPIDOGREL BISULFATE 75 MG: 75 TABLET ORAL at 08:24

## 2024-05-13 RX ADMIN — METOPROLOL TARTRATE 12.5 MG: 25 TABLET, FILM COATED ORAL at 20:47

## 2024-05-13 RX ADMIN — METOPROLOL TARTRATE 12.5 MG: 25 TABLET, FILM COATED ORAL at 08:24

## 2024-05-13 RX ADMIN — HEPARIN SODIUM 5000 UNITS: 5000 INJECTION INTRAVENOUS; SUBCUTANEOUS at 07:02

## 2024-05-13 RX ADMIN — FUROSEMIDE 20 MG: 20 TABLET ORAL at 08:24

## 2024-05-13 RX ADMIN — DILTIAZEM HYDROCHLORIDE 240 MG: 120 CAPSULE, EXTENDED RELEASE ORAL at 08:24

## 2024-05-13 ASSESSMENT — PAIN SCALES - GENERAL
PAINLEVEL_OUTOF10: 0
PAINLEVEL_OUTOF10: 2
PAINLEVEL_OUTOF10: 0
PAINLEVEL_OUTOF10: 3
PAINLEVEL_OUTOF10: 0

## 2024-05-13 ASSESSMENT — PAIN SCALES - WONG BAKER
WONGBAKER_NUMERICALRESPONSE: NO HURT

## 2024-05-13 ASSESSMENT — PAIN DESCRIPTION - ONSET
ONSET: SUDDEN
ONSET: SUDDEN

## 2024-05-13 ASSESSMENT — PAIN DESCRIPTION - LOCATION
LOCATION: HIP

## 2024-05-13 ASSESSMENT — PAIN DESCRIPTION - FREQUENCY
FREQUENCY: CONTINUOUS
FREQUENCY: CONTINUOUS

## 2024-05-13 ASSESSMENT — PAIN DESCRIPTION - DESCRIPTORS
DESCRIPTORS: ACHING

## 2024-05-13 ASSESSMENT — PAIN DESCRIPTION - PAIN TYPE
TYPE: SURGICAL PAIN

## 2024-05-13 ASSESSMENT — PAIN DESCRIPTION - ORIENTATION
ORIENTATION: LEFT

## 2024-05-13 ASSESSMENT — PAIN - FUNCTIONAL ASSESSMENT
PAIN_FUNCTIONAL_ASSESSMENT: PREVENTS OR INTERFERES SOME ACTIVE ACTIVITIES AND ADLS

## 2024-05-13 NOTE — CARE COORDINATION
Discharge plan changed. Family would like to take the pt directly home with [facility] and home medical equipment. Referral made to [facility]. Family requested referral to SNF [facility] be canceled. Norma/ aware. Will require HC order for PT/OT/Skilled RN.    Pt will need wheelchair and hospital bed. Message sent to [facility]. Requests to be made aware when closer to discharge for equipment. Pt will also require orders for DME with needed verbiage.

## 2024-05-13 NOTE — PROGRESS NOTES
PHARMACY VANCOMYCIN MONITORING SERVICE  Pharmacy consulted by Dr. Sears for monitoring and adjustment.    Indication for treatment: Vancomycin indication: SSTI with risk factors   Goal trough: Trough Goal: 10-15 mcg/mL  AUC/SOTO: <500    Risk Factors for MRSA Identified:   Hospitalization within the past 90 days    Pertinent Laboratory Values:   Temp Readings from Last 3 Encounters:   05/12/24 97.8 °F (36.6 °C) (Oral)   05/12/24 97.7 °F (36.5 °C) (Oral)   05/09/24 97.9 °F (36.6 °C) (Oral)     Recent Labs     05/12/24  1324 05/12/24 2038   WBC 13.7* 13.6*     Recent Labs     05/12/24  1324 05/12/24 2038   BUN 17 15   CREATININE 0.9 0.9     Estimated Creatinine Clearance: 30 mL/min (based on SCr of 0.9 mg/dL).  No intake or output data in the 24 hours ending 05/13/24 0209  Last Encounter Weight:  Wt Readings from Last 3 Encounters:   05/12/24 49.4 kg (109 lb)   05/09/24 49.8 kg (109 lb 12.8 oz)   05/04/24 48 kg (105 lb 13.1 oz)       Pertinent Cultures:   Date    Source    Results  5/12   Blood    In process    Vancomycin level:   TROUGH:  No results for input(s): \"VANCOTROUGH\" in the last 72 hours.  RANDOM:  No results for input(s): \"VANCORANDOM\" in the last 72 hours.    Assessment:  HPI: 89 year old female with a history of heart failure, GERD, atrial fibrillation, hypothyroidism and left femoral neck fracture presents as a transfer from [place] ED with altered mental status and non-purulent discharge from site of her recent hip surgery.  SCr, BUN, and urine output: CKD IIIa, Scr = 0.9, BUN = 15, No I/O data  Day(s) of therapy: 1 of 5  Vancomycin concentration: To be collected    Plan:  Received Vancomycin 1500 mg x 1 in ED  Will start Vancomycin 500 mg IV every 24 hours  Predicted trough of 12.9 and AUC: 424 at steady-state  Pharmacy will continue to monitor patient and adjust therapy as indicated    VANCOMYCIN CONCENTRATION SCHEDULED FOR 5/14 @0600    Thank you for the consult.  Martin Avalos

## 2024-05-13 NOTE — PROGRESS NOTES
0 - 0.43 %    Total Immature Neutrophil 0.19 K/CU MM   CMP    Collection Time: 05/12/24  1:24 PM   Result Value Ref Range    Sodium 141 135 - 145 MMOL/L    Potassium 4.5 3.5 - 5.1 MMOL/L    Chloride 107 99 - 110 mMol/L    CO2 26 21 - 32 MMOL/L    Anion Gap 8 7 - 16    Glucose 108 (H) 70 - 99 MG/DL    BUN 17 6 - 23 MG/DL    Creatinine 0.9 0.6 - 1.1 MG/DL    Est, Glom Filt Rate 60 (L) >60 mL/min/1.73m2    Calcium 8.8 8.3 - 10.6 MG/DL    Total Protein 5.3 (L) 6.4 - 8.2 GM/DL    Albumin 2.9 (L) 3.4 - 5.0 GM/DL    Total Bilirubin 0.8 0.0 - 1.0 MG/DL    Alkaline Phosphatase 111 40 - 129 IU/L    ALT 8 (L) 10 - 40 U/L    AST 22 15 - 37 IU/L   TSH    Collection Time: 05/12/24  1:24 PM   Result Value Ref Range    TSH, High Sensitivity 3.280 0.270 - 4.20 uIu/ml   Lactate, Sepsis    Collection Time: 05/12/24  1:24 PM   Result Value Ref Range    Lactic Acid, Sepsis 1.4 0.4 - 2.0 mMOL/L   Procalcitonin    Collection Time: 05/12/24  1:24 PM   Result Value Ref Range    Procalcitonin 0.141    EKG 12 Lead    Collection Time: 05/12/24  1:26 PM   Result Value Ref Range    Ventricular Rate 146 BPM    Atrial Rate 136 BPM    QRS Duration 104 ms    Q-T Interval 314 ms    QTc Calculation (Bazett) 489 ms    R Axis -52 degrees    T Axis 121 degrees    Diagnosis       Atrial fibrillation with rapid ventricular response  Left axis deviation  Anteroseptal infarct (cited on or before 12-JAN-2022)  ST & T wave abnormality, consider lateral ischemia  Abnormal ECG  When compared with ECG of 04-MAY-2024 04:50,  No significant change was found  Confirmed by MELONY KRAMER ([id number]) on 5/12/2024 2:04:29 PM     Urinalysis    Collection Time: 05/12/24  3:00 PM   Result Value Ref Range    Color, UA YELLOW YELLOW    Clarity, UA CLEAR CLEAR    Glucose, Ur NEGATIVE NEGATIVE MG/DL    Bilirubin, Urine NEGATIVE NEGATIVE MG/DL    Ketones, Urine NEGATIVE NEGATIVE MG/DL    Specific Gravity, UA 1.015 1.001 - 1.035    Blood, Urine SMALL NUMBER OR AMOUNT OBSERVED     Magnesium 1.8 1.8 - 2.4 mg/dl   Protime-INR    Collection Time: 05/12/24  8:38 PM   Result Value Ref Range    Protime 14.6 (H) 11.7 - 14.5 SECONDS    INR 1.1 INDEX   CBC with Auto Differential    Collection Time: 05/12/24  8:38 PM   Result Value Ref Range    WBC 13.6 (H) 4.0 - 10.5 K/CU MM    RBC 2.91 (L) 4.2 - 5.4 M/CU MM    Hemoglobin 8.8 (L) 12.5 - 16.0 GM/DL    Hematocrit 28.8 (L) 37 - 47 %    MCV 99.0 78 - 100 FL    MCH 30.2 27 - 31 PG    MCHC 30.6 (L) 32.0 - 36.0 %    RDW 16.1 (H) 11.7 - 14.9 %    Platelets 336 140 - 440 K/CU MM    MPV 9.2 7.5 - 11.1 FL    Differential Type AUTOMATED DIFFERENTIAL     Neutrophils % 65.0 36 - 66 %    Lymphocytes % 20.6 (L) 24 - 44 %    Monocytes % 8.7 (H) 0 - 4 %    Eosinophils % 3.8 (H) 0 - 3 %    Basophils % 0.4 0 - 1 %    Neutrophils Absolute 8.8 K/CU MM    Lymphocytes Absolute 2.8 K/CU MM    Monocytes Absolute 1.2 K/CU MM    Eosinophils Absolute 0.5 K/CU MM    Basophils Absolute 0.1 K/CU MM    Nucleated RBC % 0.0 %    Total Nucleated RBC 0.0 K/CU MM    Total Immature Neutrophil 0.21 K/CU MM    Immature Neutrophil % 1.5 (H) 0 - 0.43 %        Imaging/Diagnostics Last 24 Hours   CT FEMUR LEFT W CONTRAST    Result Date: 5/12/2024  Reason: Recent femur surgery with copious discharge from wound evaluate for the possibility of infection CT of the left femur with contrast TECHNIQUE: Collimated helical images are made through the left femur after administration of 75 mL of Isovue-370 intravenous contrast. Up-to-date CT equipment and radiation dose reduction techniques were employed. FINDINGS: Just beneath the inferior staples of the left lateral thigh closure, there is evidence of a 2.2 x 2.2 x 5.7 cm tubular collection. No air-fluid levels are identified within this. Patient's status post ORIF left proximal femur with abe and screws. Above a bladder this decompressed by Morrison catheter, a large cystic focus identified with septations measuring 10.5 x 17.5 cm.     1. Findings

## 2024-05-13 NOTE — H&P
History and Physical      Name:  Breezy Hernández /Age/Sex: 3/26/1931  (89 y.o. female)   MRN & CSN:  [id number] & [id number] Encounter Date/Time: 2024 8:42 PM EDT   Location:  [facility] PCP: Sharmila Driscoll, APRN - NP       Assessment and Plan:   Breezy Hernández is a 89 y.o. female with paroxysmal A-fib, CKD stage IIIa, chronic systolic/diastolic heart failure, hypothyroidism, GERD, recent left femoral neck fracture status post IM nail presented as a transfer from [place] [facility] with altered mental status.    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 Continue IV Vancomycin and start IV Rocephin. Follow up inflammatory markers, blood and urine culture.  Local wound care, consult orthopedic surgery    Paroxysmal A-fib with RVR, likely secondary to above  Rapid response called on arrival, HR sustaining 150s hemodynamically stable  Transferred to stepdown, IV  mL bolus, IV magnesium 2 g and IV Cardizem 5 mg once.  Resume home Cardizem and Lopressor.  Eliquis was recently discontinued and started on aspirin and Plavix due to anemia    Concern for ovarian neoplasm  Noted on previous imaging but does not want to pursue further investigation    Chronic systolic and diastolic heart failure  Clinically hypovolemic  Most recent echo 2023 EF 45 to 50%, grade 2 diastolic dysfunction, global hypokinesis, moderate AR, mild TR, trace AL, mild to moderate MR  Continue home Lasix  Continue Lopressor, consider adding ARB upon discharge    Anemia of chronic disease  Hb 8.8, improving from most recent admission  No evidence of overt bleeding  Monitor CBC     CKD stage 3a  Cr 0.9 at baseline  Monitor metabolic panel     Hypothyroidism  Continue home Synthroid    GERD  Continue home Protonix    Inpatient stepdown with telemetry  Unable to  81 MG chewable tablet Take 1 tablet by mouth daily 5/10/24   Vida Chavez MD   clopidogrel (PLAVIX) 75 MG tablet Take 1 tablet by mouth daily 5/10/24   Vida Chavez MD   folic acid (FOLVITE) 1 MG tablet Take 1 tablet by mouth daily 5/9/24   Vida Chavez MD   furosemide (LASIX) 20 MG tablet Take 1 tablet by mouth every other day 5/7/24 8/5/24  Benjamin Booker MD   dilTIAZem (DILACOR XR) 240 MG extended release capsule Take 1 capsule by mouth daily 3/22/24   Adam Andrews MD   metoprolol tartrate (LOPRESSOR) 25 MG tablet TAKE 1/2 TABLET BY MOUTH TWICE A DAY 2/15/24   Adam Andrews MD   hydrOXYzine (ATARAX) 10 MG/5ML syrup Take 5 mLs by mouth 3 times daily    Jesus Sal MD   levothyroxine (SYNTHROID) 75 MCG tablet Take 1 tablet by mouth Daily 3/9/22   Srinivas Arce MD   omeprazole (PRILOSEC) 20 MG capsule Take 1 capsule by mouth daily    Provider, MD Jesus       Physical Exam:      General: NAD, frail  Eyes: No scleral icterus or jaundice  ENT: neck supple  Cardiovascular: A-fib RVR  Respiratory: Clear to auscultation  Gastrointestinal: Soft, non tender  Genitourinary: no suprapubic tenderness  Musculoskeletal: No edema, left lateral postop incision with serosanguineous discharge, no fluctuance erythema or swelling  Skin: warm, dry  Neuro: Alert oriented x 2  Psych: Mood appropriate    Past Medical History:   PMHx   Past Medical History:   Diagnosis Date    A-fib (HCC)     Arthritis     Thyroid disease      PSHX:  has a past surgical history that includes Tonsillectomy; Appendectomy; Cholecystectomy; Hysterectomy; eye surgery; fracture surgery; and hip surgery (Left, 5/5/2024).  Allergies: No Known Allergies  Fam HX: None mentioned  Soc HX:   Social History     Socioeconomic History    Marital status:    Tobacco Use    Smoking status: Never    Smokeless tobacco: Never   Vaping Use    Vaping Use: Never used   Substance and Sexual Activity    Alcohol use: No    Drug use: No    Sexual

## 2024-05-13 NOTE — PROGRESS NOTES
4 Eyes Skin Assessment     NAME:  Breezy Hernández  YOB: 1931  MEDICAL RECORD NUMBER:  [id number]    The patient is being assessed for  Admission    I agree that at least one RN has performed a thorough Head to Toe Skin Assessment on the patient. ALL assessment sites listed below have been assessed.      Areas assessed by both nurses:    Head, Face, Ears, Shoulders, Back, Chest, Arms, Elbows, Hands, Sacrum. Buttock, Coccyx, Ischium, Legs. Feet and Heels, and Under Medical Devices         Does the Patient have a Wound? No noted wound(s)       Lamont Prevention initiated by RN: Yes  Wound Care Orders initiated by RN: No    Pressure Injury (Stage 3,4, Unstageable, DTI, NWPT, and Complex wounds) if present, place Wound referral order by RN under : No    New Ostomies, if present place, Ostomy referral order under : No     Nurse 1 eSignature: Electronically signed by Von Carlos RN on 5/12/24 at 11:52 PM EDT    **SHARE this note so that the co-signing nurse can place an eSignature**    Nurse 2 eSignature: Electronically signed by Christina Su RN on 5/13/24 at 7:08 AM EDT

## 2024-05-13 NOTE — PROGRESS NOTES
Pharmacy Note - Renal Dosing and Extended Infusion Beta-Lactam Adjustment    Cefepime 2000mg Q12h for treatment of Skin and soft tissue infection. Per [facility] Renal Dose Adjustment Policy and Extended Infusion Beta-Lactam Policy, cefepime will be changed to 2000mg load followed by 2000mg Q24h extended infusion    Estimated Creatinine Clearance: Estimated Creatinine Clearance: 30 mL/min (based on SCr of 0.9 mg/dL).    Please call with any questions.    Thank you,    Zachary Garcia, [facility]

## 2024-05-13 NOTE — CONSULTS
[facility] Ostomy Continence Nurse  Consult Note       Breezy Hernández  AGE: 89 y.o.   GENDER: female  : 3/26/1931  TODAY'S DATE:  2024    Subjective:     Reason for Evaluation and Assessment: wound assessment      Breezy Hernández is a 89 y.o. female referred by:   [] Physician  [] Nursing  [] Other:     Wound Identification:  Wound Type: pressure and non-healing surgical  Contributing Factors: edema, chronic pressure, decreased mobility, and shear force        PAST MEDICAL HISTORY        Diagnosis Date    A-fib (HCC)     Arthritis     Thyroid disease        PAST SURGICAL HISTORY    Past Surgical History:   Procedure Laterality Date    APPENDECTOMY      CHOLECYSTECTOMY      EYE SURGERY      FRACTURE SURGERY      HIP SURGERY Left 2024    HIP INTRAMEDULLARY NAIL BK INSERTION performed by Francisco Montilla DO at [facility] OR    HYSTERECTOMY (CERVIX STATUS UNKNOWN)      TONSILLECTOMY         FAMILY HISTORY    No family history on file.    SOCIAL HISTORY    Social History     Tobacco Use    Smoking status: Never    Smokeless tobacco: Never   Vaping Use    Vaping Use: Never used   Substance Use Topics    Alcohol use: No    Drug use: No       ALLERGIES    No Known Allergies    MEDICATIONS    No current facility-administered medications on file prior to encounter.     Current Outpatient Medications on File Prior to Encounter   Medication Sig Dispense Refill    aspirin 81 MG chewable tablet Take 1 tablet by mouth daily 30 tablet 3    clopidogrel (PLAVIX) 75 MG tablet Take 1 tablet by mouth daily 30 tablet 3    folic acid (FOLVITE) 1 MG tablet Take 1 tablet by mouth daily 90 tablet 1    furosemide (LASIX) 20 MG tablet Take 1 tablet by mouth every other day 45 tablet 0    dilTIAZem (DILACOR XR) 240 MG extended release capsule Take 1 capsule by mouth daily 90 capsule 3    metoprolol tartrate (LOPRESSOR) 25 MG tablet TAKE 1/2 TABLET BY MOUTH TWICE A DAY 90 tablet 3    hydrOXYzine (ATARAX) 10 MG/5ML syrup Take 5 mLs by

## 2024-05-13 NOTE — CARE COORDINATION
Chart reviewed; pt not speaking currently, spoke with pt's POA/Niece in room at bedside. Pt from [facility] SNF. Family wants to continue rehab and then bring pt home again. Pt lives with niece/POA Vendal. Will require PT/OT for precert to return.   Plan return to [facility].   05/13/24 0922   Service Assessment   Patient Orientation Unable to Assess   Cognition Dementia / Early Alzheimer's   History Provided By Medical Record;Other (see comment)  (Neice/POA)   Primary Caregiver Other (Comment)  (SNF staff and neice)   Accompanied By/Relationship Neice/POA and great neice   Support Systems Family Members;Home Care Staff   Patient's Healthcare Decision Maker is: Named in Scanned ACP Document   PCP Verified by CM Yes   Prior Functional Level Assistance with the following:   Current Functional Level Assistance with the following:   Can patient return to prior living arrangement Yes   Ability to make needs known: Poor   Family able to assist with home care needs: Yes   Would you like for me to discuss the discharge plan with any other family members/significant others, and if so, who? Yes  (POA)   Financial Resources Medicare   Community Resources ECF/Home Care   Social/Functional History   Lives With Other (comment)  (neice)   Condition of Participation: Discharge Planning   The Plan for Transition of Care is related to the following treatment goals: Return to SNF at [facility]   The Patient and/or Patient Representative was provided with a Choice of Provider? Patient Representative   Name of the Patient Representative who was provided with the Choice of Provider and agrees with the Discharge Plan?  POA - Vendal   The Patient and/Or Patient Representative agree with the Discharge Plan? Yes   Freedom of Choice list was provided with basic dialogue that supports the patient's individualized plan of care/goals, treatment preferences, and shares the quality data associated with the providers?

## 2024-05-14 ENCOUNTER — TELEPHONE (OUTPATIENT)
Dept: CARDIOLOGY CLINIC | Age: 89
End: 2024-05-14

## 2024-05-14 PROBLEM — N39.0 URINARY TRACT INFECTION WITHOUT HEMATURIA: Status: ACTIVE | Noted: 2024-05-14

## 2024-05-14 PROBLEM — F03.90 DEMENTIA (HCC): Status: ACTIVE | Noted: 2024-05-14

## 2024-05-14 LAB
ANION GAP SERPL CALCULATED.3IONS-SCNC: 13 MMOL/L (ref 7–16)
BASOPHILS ABSOLUTE: 0.1 K/CU MM
BASOPHILS RELATIVE PERCENT: 0.7 % (ref 0–1)
BUN SERPL-MCNC: 17 MG/DL (ref 6–23)
CALCIUM SERPL-MCNC: 7.8 MG/DL (ref 8.3–10.6)
CHLORIDE BLD-SCNC: 109 MMOL/L (ref 99–110)
CO2: 18 MMOL/L (ref 21–32)
CREAT SERPL-MCNC: 0.9 MG/DL (ref 0.6–1.1)
DIFFERENTIAL TYPE: ABNORMAL
DOSE AMOUNT: NORMAL
DOSE TIME: NORMAL
EOSINOPHILS ABSOLUTE: 0.7 K/CU MM
EOSINOPHILS RELATIVE PERCENT: 6.2 % (ref 0–3)
GFR, ESTIMATED: 60 ML/MIN/1.73M2
GLUCOSE SERPL-MCNC: 124 MG/DL (ref 70–99)
HCT VFR BLD CALC: 29.4 % (ref 37–47)
HEMOGLOBIN: 8.1 GM/DL (ref 12.5–16)
IMMATURE NEUTROPHIL %: 1.4 % (ref 0–0.43)
LYMPHOCYTES ABSOLUTE: 2.5 K/CU MM
LYMPHOCYTES RELATIVE PERCENT: 21.3 % (ref 24–44)
MCH RBC QN AUTO: 30.5 PG (ref 27–31)
MCHC RBC AUTO-ENTMCNC: 27.6 % (ref 32–36)
MCV RBC AUTO: 110.5 FL (ref 78–100)
MONOCYTES ABSOLUTE: 1.3 K/CU MM
MONOCYTES RELATIVE PERCENT: 10.8 % (ref 0–4)
NEUTROPHILS ABSOLUTE: 7.1 K/CU MM
NEUTROPHILS RELATIVE PERCENT: 59.6 % (ref 36–66)
NUCLEATED RBC %: 0 %
PDW BLD-RTO: 16.4 % (ref 11.7–14.9)
PLATELET # BLD: 344 K/CU MM (ref 140–440)
PMV BLD AUTO: 9.4 FL (ref 7.5–11.1)
POTASSIUM SERPL-SCNC: 5.1 MMOL/L (ref 3.5–5.1)
RBC # BLD: 2.66 M/CU MM (ref 4.2–5.4)
SODIUM BLD-SCNC: 140 MMOL/L (ref 135–145)
TOTAL IMMATURE NEUTOROPHIL: 0.16 K/CU MM
TOTAL NUCLEATED RBC: 0 K/CU MM
VANCOMYCIN RANDOM: 13.9 UG/ML
WBC # BLD: 11.8 K/CU MM (ref 4–10.5)

## 2024-05-14 PROCEDURE — 2500000003 HC RX 250 WO HCPCS: Performed by: STUDENT IN AN ORGANIZED HEALTH CARE EDUCATION/TRAINING PROGRAM

## 2024-05-14 PROCEDURE — 99222 1ST HOSP IP/OBS MODERATE 55: CPT | Performed by: INTERNAL MEDICINE

## 2024-05-14 PROCEDURE — 6370000000 HC RX 637 (ALT 250 FOR IP): Performed by: FAMILY MEDICINE

## 2024-05-14 PROCEDURE — 36415 COLL VENOUS BLD VENIPUNCTURE: CPT

## 2024-05-14 PROCEDURE — 6370000000 HC RX 637 (ALT 250 FOR IP): Performed by: STUDENT IN AN ORGANIZED HEALTH CARE EDUCATION/TRAINING PROGRAM

## 2024-05-14 PROCEDURE — 6360000002 HC RX W HCPCS: Performed by: STUDENT IN AN ORGANIZED HEALTH CARE EDUCATION/TRAINING PROGRAM

## 2024-05-14 PROCEDURE — 6370000000 HC RX 637 (ALT 250 FOR IP): Performed by: INTERNAL MEDICINE

## 2024-05-14 PROCEDURE — 85025 COMPLETE CBC W/AUTO DIFF WBC: CPT

## 2024-05-14 PROCEDURE — 2140000000 HC CCU INTERMEDIATE R&B

## 2024-05-14 PROCEDURE — 99223 1ST HOSP IP/OBS HIGH 75: CPT | Performed by: INTERNAL MEDICINE

## 2024-05-14 PROCEDURE — 80202 ASSAY OF VANCOMYCIN: CPT

## 2024-05-14 PROCEDURE — 80048 BASIC METABOLIC PNL TOTAL CA: CPT

## 2024-05-14 PROCEDURE — 2580000003 HC RX 258: Performed by: STUDENT IN AN ORGANIZED HEALTH CARE EDUCATION/TRAINING PROGRAM

## 2024-05-14 PROCEDURE — 94761 N-INVAS EAR/PLS OXIMETRY MLT: CPT

## 2024-05-14 PROCEDURE — 6370000000 HC RX 637 (ALT 250 FOR IP)

## 2024-05-14 RX ORDER — METOPROLOL TARTRATE 1 MG/ML
5 INJECTION, SOLUTION INTRAVENOUS EVERY 6 HOURS
Status: DISCONTINUED | OUTPATIENT
Start: 2024-05-14 | End: 2024-05-17 | Stop reason: HOSPADM

## 2024-05-14 RX ORDER — METOPROLOL TARTRATE 50 MG/1
50 TABLET, FILM COATED ORAL 2 TIMES DAILY
Status: DISCONTINUED | OUTPATIENT
Start: 2024-05-14 | End: 2024-05-17 | Stop reason: HOSPADM

## 2024-05-14 RX ADMIN — DILTIAZEM HYDROCHLORIDE 240 MG: 120 CAPSULE, EXTENDED RELEASE ORAL at 08:09

## 2024-05-14 RX ADMIN — METOPROLOL TARTRATE 2.5 MG: 1 INJECTION, SOLUTION INTRAVENOUS at 09:39

## 2024-05-14 RX ADMIN — HEPARIN SODIUM 5000 UNITS: 5000 INJECTION INTRAVENOUS; SUBCUTANEOUS at 20:10

## 2024-05-14 RX ADMIN — LEVOTHYROXINE SODIUM 75 MCG: 0.07 TABLET ORAL at 05:58

## 2024-05-14 RX ADMIN — VANCOMYCIN HYDROCHLORIDE 500 MG: 500 INJECTION, POWDER, LYOPHILIZED, FOR SOLUTION INTRAVENOUS at 17:28

## 2024-05-14 RX ADMIN — METOPROLOL TARTRATE 50 MG: 50 TABLET, FILM COATED ORAL at 20:11

## 2024-05-14 RX ADMIN — PANTOPRAZOLE SODIUM 20 MG: 20 TABLET, DELAYED RELEASE ORAL at 05:58

## 2024-05-14 RX ADMIN — METOPROLOL TARTRATE 5 MG: 1 INJECTION, SOLUTION INTRAVENOUS at 15:29

## 2024-05-14 RX ADMIN — CEFEPIME 2000 MG: 2 INJECTION, POWDER, FOR SOLUTION INTRAVENOUS at 08:25

## 2024-05-14 RX ADMIN — METOPROLOL TARTRATE 12.5 MG: 25 TABLET, FILM COATED ORAL at 02:40

## 2024-05-14 RX ADMIN — SODIUM CHLORIDE, POTASSIUM CHLORIDE, SODIUM LACTATE AND CALCIUM CHLORIDE: 600; 310; 30; 20 INJECTION, SOLUTION INTRAVENOUS at 07:33

## 2024-05-14 RX ADMIN — HEPARIN SODIUM 5000 UNITS: 5000 INJECTION INTRAVENOUS; SUBCUTANEOUS at 04:57

## 2024-05-14 ASSESSMENT — PAIN SCALES - WONG BAKER
WONGBAKER_NUMERICALRESPONSE: NO HURT

## 2024-05-14 ASSESSMENT — PAIN SCALES - GENERAL: PAINLEVEL_OUTOF10: 0

## 2024-05-14 NOTE — PROGRESS NOTES
Breezy Hernández (3/26/1931)    Daily Progress Note-  Jeronimogracieshanon Roldan                    Today's Date:   5/14/2024          Subjective:   Patient seen and examined resting comfortably in bed, family at bedside  Doing okay postoperatively.  Concern for sepsis per patient his lab work and orthopedics was consulted due to CT findings of fluid around one of the hip incisions  Pain controlled at this time  No issues overnight per nursing staff  Per the family, patient has worked very limited with therapy.    Objective:    Patient Vitals for the past 4 hrs:   BP Pulse SpO2   05/14/24 0809 111/80 (!) 137 --   05/14/24 0445 123/63 (!) 102 99 %          Vitals:    05/14/24 0809   BP: 111/80   Pulse: (!) 137   Resp:    Temp:    SpO2:         Physical Exam:     The patient is  awake and confused, at baseline  Resting comfortably in bed    Operative extremity:    The dressing is without any significant saturation.  Incisions look appropriate without any concerning findings for infection including erythema  Sensation and motor function intact distally  Leg lengths equal and appropriately aligned  Minimally tender over incision sites with no hematoma    No additional areas of tenderness or pain in the bilateral upper extremities or contralateral lower extremity.  Neurovascularly intact throughout bilateral upper extremities and contralateral lower extremity    Brisk capillary refill and negative Homans bilaterally.  Compartments are soft and compressible      LABS   CBC:   Recent Labs     05/12/24  1324 05/12/24  2038 05/14/24  0224   WBC 13.7* 13.6* 11.8*   HGB 8.6* 8.8* 8.1*    336 344       IMAGING   CT femur left with contrast demonstrates:  Just beneath the inferior staples of the left lateral thigh closure, there is evidence of a 2.2 x 2.2 x 5.7 cm tubular collection. No air-fluid levels are

## 2024-05-14 NOTE — PLAN OF CARE
Problem: Discharge Planning  Goal: Discharge to home or other facility with appropriate resources  Outcome: Progressing     Problem: Safety - Adult  Goal: Free from fall injury  Outcome: Progressing     Problem: Skin/Tissue Integrity  Goal: Absence of new skin breakdown  Description: 1.  Monitor for areas of redness and/or skin breakdown  2.  Assess vascular access sites hourly  3.  Every 4-6 hours minimum:  Change oxygen saturation probe site  4.  Every 4-6 hours:  If on nasal continuous positive airway pressure, respiratory therapy assess nares and determine need for appliance change or resting period.  Outcome: Progressing     Problem: Pain  Goal: Verbalizes/displays adequate comfort level or baseline comfort level  Outcome: Progressing     Problem: ABCDS Injury Assessment  Goal: Absence of physical injury  Outcome: Progressing

## 2024-05-14 NOTE — TELEPHONE ENCOUNTER
Shannon from [facility] called . Dr. Andrews had sent in orders for pt.blood pressure to be monitored and they need the orders signed. If you have any questions Shannon can be reached at [phone].

## 2024-05-14 NOTE — PROGRESS NOTES
PHARMACY VANCOMYCIN MONITORING SERVICE  Pharmacy consulted by Dr. Sears for monitoring and adjustment.    Indication for treatment: Vancomycin indication: SSTI with risk factors   Goal trough: Trough Goal: 10-15 mcg/mL  AUC/SOTO: <500    Risk Factors for MRSA Identified:   Hospitalization within the past 90 days    Pertinent Laboratory Values:   Temp Readings from Last 3 Encounters:   05/14/24 97.4 °F (36.3 °C) (Oral)   05/12/24 97.7 °F (36.5 °C) (Oral)   05/09/24 97.9 °F (36.6 °C) (Oral)     Recent Labs     05/12/24  1324 05/12/24 2038 05/14/24 0224   WBC 13.7* 13.6* 11.8*       Recent Labs     05/12/24 1324 05/12/24 2038 05/14/24 0224   BUN 17 15 17   CREATININE 0.9 0.9 0.9       Estimated Creatinine Clearance: 30 mL/min (based on SCr of 0.9 mg/dL).    Intake/Output Summary (Last 24 hours) at 5/14/2024 1331  Last data filed at 5/14/2024 0800  Gross per 24 hour   Intake 1698.92 ml   Output 1800 ml   Net -101.08 ml     Last Encounter Weight:  Wt Readings from Last 3 Encounters:   05/13/24 51.8 kg (114 lb 3.2 oz)   05/12/24 49.4 kg (109 lb)   05/09/24 49.8 kg (109 lb 12.8 oz)       Pertinent Cultures:   Date    Source    Results  5/12   Blood    No growth  5/12                            Urine                                       In process    Vancomycin level:   TROUGH:  No results for input(s): \"VANCOTROUGH\" in the last 72 hours.  RANDOM:    Recent Labs     05/14/24 0224   VANCORANDOM 13.9       Assessment:  HPI: 89 year old female with a history of heart failure, GERD, atrial fibrillation, hypothyroidism and left femoral neck fracture presents as a transfer from [place] ED with altered mental status and non-purulent discharge from site of her recent hip surgery.  SCr, BUN, and urine output: CKD IIIa, Scr close to baseline of 0.9, BUN normal, UOP good  Day(s) of therapy: 2 of 5 (ID consulted)  Vancomycin concentration:  5/16 - random @06:00    Plan:  Received Vancomycin 1500 mg x 1 in ED as first dose  CKD3,  renal trends close to baseline.  Continue Vancomycin 500 mg IV every 24 hours  Predicted trough of 11.8 and AUC <500 at steady-state  Recheck the vanco level in 2 days to monitor for accumulation 2/2 advanced age.  Pharmacy will continue to monitor patient and adjust therapy as indicated    VANCOMYCIN CONCENTRATION SCHEDULED FOR 5/16 @0600    Thank you for the consult.  Reji Figueredo RPH  5/14/2024 1:31 PM

## 2024-05-14 NOTE — CARE COORDINATION
Chart reviewed. Noted that family plans to take pt home with [facility] and requested a hospital bed and wheelchair.  CM/Tangela made the referral to [facility] and Johan CROCKER yesterday.  This CM notified Dr Welch of need for orders and required documentation for the bed & w/c to be put in his progress note.  D/c plan is home with family and [facility].  Need to notify [facility] the day before d/c so that they can deliver the hospital bed and w/c.  Notify CM if any new d/c needs arise.  TE    .Upon discharge pt will be going home with [facility] home care  Please fax H/P, D/S.  AVS, order, and face sheet to [phone]  Please call [phone] and inform of discharge

## 2024-05-14 NOTE — PROGRESS NOTES
V2.0  [facility] [place]      Name:  Breezy Hernández /Age/Sex: 3/26/1931  (89 y.o. female)   MRN & CSN:  [id number] & [id number] Encounter Date/Time: 2024 12:20 PM EDT    Location:  [address] [place], [place] [address] PCP: Sharmila Driscoll APRN - NP       Hospital Day: 3    Assessment and Plan:   Breezy Hernández is a 89 y.o. female  who presents with Sepsis (HCC)      Plan:      Sepsis POA  Suspected urinary tract infection   Prostatic joint infection  CT L. Femur concerning for fluid collection which is concerning for abscess.  UA also consistent with infection.  IV antibiotics: Vancomycin plus cefepime  Consult infectious disease  Follow up inflammatory markers, blood and urine culture.  Local wound care,   consult orthopedic surgery, no concern for surgical site infection, continue PT/OT     Paroxysmal A-fib with RVR, likely secondary to above  Rapid response called on arrival, HR sustaining 150s hemodynamically stable  Transferred to stepdown, IV  mL bolus, IV magnesium 2 g and IV Cardizem 5 mg once.  Resume home Cardizem and Lopressor.  Eliquis was recently discontinued and started on aspirin and Plavix due to anemia  Cardiology consulted, appreciate recs     Concern for ovarian neoplasm  Noted on previous imaging but does not want to pursue further investigation     Chronic systolic and diastolic heart failure  Clinically hypovolemic  Most recent echo 2023 EF 45 to 50%, grade 2 diastolic dysfunction, global hypokinesis, moderate AR, mild TR, trace [place], mild to moderate MR  Continue home Lasix  Continue Lopressor, consider adding ARB upon discharge     Anemia of chronic disease  Hb 8.8, improving from most recent admission  No evidence of overt bleeding  Monitor CBC     CKD stage 3a  Cr 0.9 at baseline  Monitor metabolic panel     Hypothyroidism  Continue home Synthroid     GERD  Protonix    Diet ADULT DIET; Regular; Low Sodium (2 gm)   DVT Prophylaxis [] Lovenox, [x]  Heparin, [] SCDs, []

## 2024-05-14 NOTE — CONSULTS
Infectious Disease Consult Note  2024   Patient Name: Breezy Hernández : 3/26/1931     Assessment  Suspected UTI  Recently discharged from the hospital on 2024 following admission for left femoral neck fracture after a mechanical fall.  Left hip intramedullary nail was inserted on 2024.  She returned on 2024 with confusion.  Left hip surgical incision does not appear infected.  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   Diagnostic:  F/u:  2024: Blood culture  Urine culture  Other:     Thank you for allowing me to consult in the care of this patient.  ------------------------  REASON FOR CONSULT: \"Concern for prosthetic joint infection.\"  Requested by: Arturo Tam MD  History obtained from chart review, and family member as the patient is demented  HPI:Patient is a 89 y.o. female living with dementia, CHF who was admitted on 2024 for left femoral neck fracture that she had sustained after a mechanical fall at home.  Dr. Montilla inserted a left hip intramedullary nail on 2024.  She was discharged on 2024.  She was readmitted on 2024 for further evaluation and management of altered mental status.  A-fib with RVR was found.  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  2 cm collections distal of the inferior most closure with staples.Urinalysis showed white blood cells count of 10, admitting hospitalist with diagnosis of sepsis present on

## 2024-05-14 NOTE — CONSULTS
Gas h/o ch afib  Full note to follow                      Name:  Breezy Hernández /Age/Sex: 3/26/1931  (89 y.o. female)   MRN & CSN:  [id number] & [id number] Admission Date/Time: 2024  8:14 PM   Location:  [facility] PCP: Sharmila Driscoll APRN - NP       Hospital Day: 3          Referring physician:  Mandi Cain MD         Reason for consultation: Atrial fibrillation        Thanks for referral.    Information source: Chart/staff    CC; altered mental status      HPI:   Thank you for involving me in taking  care of Breezy Hernández who  is a 89 y.o.year  Old female  Presents with history of A-fib CKD HFpEF hypothyroidism had left femoral fracture recently admitted with altered mental status appears that the patient was in A-fib with RVR hence the consult at the present time the heart rate is running in 80s and 90s                     Past medical history:    has a past medical history of A-fib (HCC), Arthritis, and Thyroid disease.  Past surgical history:   has a past surgical history that includes Tonsillectomy; Appendectomy; Cholecystectomy; Hysterectomy; eye surgery; fracture surgery; and hip surgery (Left, 2024).  Social History:   reports that she has never smoked. She has never used smokeless tobacco. She reports that she does not drink alcohol and does not use drugs.  Family history:  family history is not on file.    No Known Allergies    metoprolol tartrate (LOPRESSOR) tablet 25 mg, BID  metoprolol (LOPRESSOR) injection 5 mg, Q6H  vancomycin (VANCOCIN) 500 mg in sodium chloride 0.9 % 100 mL IVPB (mini-bag), Q24H  lactated ringers IV soln infusion, Continuous  ceFEPIme (MAXIPIME) 2,000 mg in sodium chloride 0.9 % 50 mL IVPB (mini-bag), Q24H  ondansetron (ZOFRAN-ODT) disintegrating tablet 4 mg, Q8H PRN   Or  ondansetron (ZOFRAN) injection 4 mg, Q6H PRN  acetaminophen (TYLENOL) tablet 650 mg, Q6H PRN   Or  acetaminophen (TYLENOL) suppository 650 mg, Q6H PRN  aspirin chewable tablet 81 mg,  system including errors in grammar, punctuation, and spelling, as well as words and phrases that may be inappropriate. If there are any questions or concerns please feel free to contact the dictating provider for clarification.

## 2024-05-14 NOTE — PLAN OF CARE
Problem: Safety - Adult  Goal: Free from fall injury  5/14/2024 0936 by Felix Jackson RN  Outcome: Progressing  5/14/2024 0603 by Ines Gross RN  Outcome: Progressing     Problem: Skin/Tissue Integrity  Goal: Absence of new skin breakdown  Description: 1.  Monitor for areas of redness and/or skin breakdown  2.  Assess vascular access sites hourly  3.  Every 4-6 hours minimum:  Change oxygen saturation probe site  4.  Every 4-6 hours:  If on nasal continuous positive airway pressure, respiratory therapy assess nares and determine need for appliance change or resting period.  5/14/2024 0936 by Felix Jackson RN  Outcome: Progressing  5/14/2024 0603 by Ines Gross RN  Outcome: Progressing     Problem: Pain  Goal: Verbalizes/displays adequate comfort level or baseline comfort level  5/14/2024 0936 by Felix Jackson RN  Outcome: Progressing  5/14/2024 0603 by Ines Gross RN  Outcome: Progressing

## 2024-05-15 PROBLEM — I50.42 CHRONIC COMBINED SYSTOLIC (CONGESTIVE) AND DIASTOLIC (CONGESTIVE) HEART FAILURE (HCC): Status: ACTIVE | Noted: 2024-05-15

## 2024-05-15 LAB
ANION GAP SERPL CALCULATED.3IONS-SCNC: 8 MMOL/L (ref 7–16)
BASOPHILS ABSOLUTE: 0.1 K/CU MM
BASOPHILS RELATIVE PERCENT: 0.5 % (ref 0–1)
BUN SERPL-MCNC: 13 MG/DL (ref 6–23)
CALCIUM SERPL-MCNC: 7.6 MG/DL (ref 8.3–10.6)
CHLORIDE BLD-SCNC: 109 MMOL/L (ref 99–110)
CO2: 23 MMOL/L (ref 21–32)
CREAT SERPL-MCNC: 0.8 MG/DL (ref 0.6–1.1)
DIFFERENTIAL TYPE: ABNORMAL
EOSINOPHILS ABSOLUTE: 0.7 K/CU MM
EOSINOPHILS RELATIVE PERCENT: 5.4 % (ref 0–3)
GFR, ESTIMATED: 69 ML/MIN/1.73M2
GLUCOSE SERPL-MCNC: 108 MG/DL (ref 70–99)
HCT VFR BLD CALC: 28.5 % (ref 37–47)
HEMOGLOBIN: 8.6 GM/DL (ref 12.5–16)
IMMATURE NEUTROPHIL %: 1.9 % (ref 0–0.43)
LYMPHOCYTES ABSOLUTE: 3 K/CU MM
LYMPHOCYTES RELATIVE PERCENT: 24.6 % (ref 24–44)
MCH RBC QN AUTO: 30.7 PG (ref 27–31)
MCHC RBC AUTO-ENTMCNC: 30.2 % (ref 32–36)
MCV RBC AUTO: 101.8 FL (ref 78–100)
MONOCYTES ABSOLUTE: 1.3 K/CU MM
MONOCYTES RELATIVE PERCENT: 10.8 % (ref 0–4)
NEUTROPHILS ABSOLUTE: 7 K/CU MM
NEUTROPHILS RELATIVE PERCENT: 56.8 % (ref 36–66)
NUCLEATED RBC %: 0 %
PDW BLD-RTO: 16.9 % (ref 11.7–14.9)
PLATELET # BLD: 344 K/CU MM (ref 140–440)
PMV BLD AUTO: 9.2 FL (ref 7.5–11.1)
POTASSIUM SERPL-SCNC: 4.5 MMOL/L (ref 3.5–5.1)
RBC # BLD: 2.8 M/CU MM (ref 4.2–5.4)
SODIUM BLD-SCNC: 140 MMOL/L (ref 135–145)
TOTAL IMMATURE NEUTOROPHIL: 0.23 K/CU MM
TOTAL NUCLEATED RBC: 0 K/CU MM
WBC # BLD: 12.3 K/CU MM (ref 4–10.5)

## 2024-05-15 PROCEDURE — 2580000003 HC RX 258: Performed by: STUDENT IN AN ORGANIZED HEALTH CARE EDUCATION/TRAINING PROGRAM

## 2024-05-15 PROCEDURE — 2140000000 HC CCU INTERMEDIATE R&B

## 2024-05-15 PROCEDURE — 80048 BASIC METABOLIC PNL TOTAL CA: CPT

## 2024-05-15 PROCEDURE — 36415 COLL VENOUS BLD VENIPUNCTURE: CPT

## 2024-05-15 PROCEDURE — 85025 COMPLETE CBC W/AUTO DIFF WBC: CPT

## 2024-05-15 PROCEDURE — NBSRV NON-BILLABLE SERVICE: Performed by: INTERNAL MEDICINE

## 2024-05-15 PROCEDURE — 6370000000 HC RX 637 (ALT 250 FOR IP): Performed by: STUDENT IN AN ORGANIZED HEALTH CARE EDUCATION/TRAINING PROGRAM

## 2024-05-15 PROCEDURE — 6360000002 HC RX W HCPCS: Performed by: STUDENT IN AN ORGANIZED HEALTH CARE EDUCATION/TRAINING PROGRAM

## 2024-05-15 PROCEDURE — 6370000000 HC RX 637 (ALT 250 FOR IP): Performed by: INTERNAL MEDICINE

## 2024-05-15 PROCEDURE — 94761 N-INVAS EAR/PLS OXIMETRY MLT: CPT

## 2024-05-15 PROCEDURE — 99232 SBSQ HOSP IP/OBS MODERATE 35: CPT | Performed by: INTERNAL MEDICINE

## 2024-05-15 PROCEDURE — APPNB30 APP NON BILLABLE TIME 0-30 MINS: Performed by: NURSE PRACTITIONER

## 2024-05-15 RX ORDER — DILTIAZEM HYDROCHLORIDE 60 MG/1
60 TABLET, FILM COATED ORAL EVERY 6 HOURS SCHEDULED
Status: DISCONTINUED | OUTPATIENT
Start: 2024-05-15 | End: 2024-05-17 | Stop reason: HOSPADM

## 2024-05-15 RX ADMIN — SODIUM CHLORIDE, POTASSIUM CHLORIDE, SODIUM LACTATE AND CALCIUM CHLORIDE: 600; 310; 30; 20 INJECTION, SOLUTION INTRAVENOUS at 01:55

## 2024-05-15 RX ADMIN — CEFEPIME 2000 MG: 2 INJECTION, POWDER, FOR SOLUTION INTRAVENOUS at 08:24

## 2024-05-15 RX ADMIN — DILTIAZEM HYDROCHLORIDE 60 MG: 60 TABLET ORAL at 12:02

## 2024-05-15 RX ADMIN — HEPARIN SODIUM 5000 UNITS: 5000 INJECTION INTRAVENOUS; SUBCUTANEOUS at 14:27

## 2024-05-15 RX ADMIN — FOLIC ACID 1 MG: 1 TABLET ORAL at 08:20

## 2024-05-15 RX ADMIN — HEPARIN SODIUM 5000 UNITS: 5000 INJECTION INTRAVENOUS; SUBCUTANEOUS at 21:18

## 2024-05-15 RX ADMIN — METOPROLOL TARTRATE 50 MG: 50 TABLET, FILM COATED ORAL at 21:18

## 2024-05-15 RX ADMIN — DILTIAZEM HYDROCHLORIDE 60 MG: 60 TABLET ORAL at 23:52

## 2024-05-15 RX ADMIN — PANTOPRAZOLE SODIUM 20 MG: 20 TABLET, DELAYED RELEASE ORAL at 05:58

## 2024-05-15 RX ADMIN — HEPARIN SODIUM 5000 UNITS: 5000 INJECTION INTRAVENOUS; SUBCUTANEOUS at 05:58

## 2024-05-15 RX ADMIN — LEVOTHYROXINE SODIUM 75 MCG: 0.07 TABLET ORAL at 05:58

## 2024-05-15 RX ADMIN — ASPIRIN 81 MG: 81 TABLET, CHEWABLE ORAL at 08:20

## 2024-05-15 RX ADMIN — FUROSEMIDE 20 MG: 20 TABLET ORAL at 08:20

## 2024-05-15 RX ADMIN — DILTIAZEM HYDROCHLORIDE 60 MG: 60 TABLET ORAL at 18:43

## 2024-05-15 RX ADMIN — CLOPIDOGREL BISULFATE 75 MG: 75 TABLET ORAL at 08:20

## 2024-05-15 RX ADMIN — METOPROLOL TARTRATE 50 MG: 50 TABLET, FILM COATED ORAL at 08:20

## 2024-05-15 ASSESSMENT — PAIN SCALES - GENERAL
PAINLEVEL_OUTOF10: 0
PAINLEVEL_OUTOF10: 0

## 2024-05-15 NOTE — PROGRESS NOTES
Infectious Disease Progress Note  5/15/2024   Patient Name: Breezy Hernández : 3/26/1931     Assessment  E coli CAUTI (POA)  Recently discharged from the hospital on 2024 following admission for left femoral neck fracture after a mechanical fall.  Left hip intramedullary nail was inserted on 2024.  She returned on 2024 with confusion.  Left hip surgical incision does not appear infected.  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  History:?Interval history noted  Denies n/v/d/f or untoward effects of antimicrobials  Physical Exam:  Vital Signs: /89   Pulse 84   Temp (!) 96.6 °F (35.9 °C) (Oral)   Resp 17   Ht 1.499 m (4' 11\")   Wt 53 kg (116 lb 13.5 oz)   SpO2 96%   BMI 23.60 kg/m²     Gen: responds to voice  Skin: no stigmata of endocarditis  Wounds: left hip staples are intact, no discharge, surrounding edema, no marked erythema  HEMT: AT/NC Oropharynx pink, moist, and without lesions or exudates;  Eyes: PERRLA, EOMI, conjunctiva pink, sclera anicteric.   Neck: Supple. Trachea midline. No LAD.  Chest: no distress and CTA. Good air movement.  Heart: RRR and no MRG.   Abd: soft, non-distended, no tenderness, no hepatomegaly. Normoactive bowel sounds.  Ext: no clubbing, cyanosis, or edema  Catheter Site: without erythema or tenderness  LDA:   Neuro: rouses to voice     Radiologic / Imaging / TESTING  No results found.     Labs:    Recent Results (from the past 24 hour(s))   Basic Metabolic Panel w/ Reflex to MG    Collection Time: 05/15/24  1:53 AM   Result Value Ref Range    Sodium 140 135 - 145 MMOL/L    Potassium 4.5 3.5 - 5.1 MMOL/L    Chloride 109 99 - 110 mMol/L    CO2 23 21 - 32 MMOL/L

## 2024-05-15 NOTE — CARE COORDINATION
Pt's niece is requesting a patient lift to be able to get pt out of bed.  CM notified Dr Welch and asked him to place the order and add the face to face to his progress note.  Notified Johan CROCKER.  She states that they will deliver the lift tomorrow with the bed and w/c.  TE

## 2024-05-15 NOTE — PLAN OF CARE
Problem: Discharge Planning  Goal: Discharge to home or other facility with appropriate resources  Outcome: Progressing     Problem: Safety - Adult  Goal: Free from fall injury  Outcome: Progressing     Problem: Skin/Tissue Integrity  Goal: Absence of new skin breakdown  Description: 1.  Monitor for areas of redness and/or skin breakdown  2.  Assess vascular access sites hourly  3.  Every 4-6 hours minimum:  Change oxygen saturation probe site  4.  Every 4-6 hours:  If on nasal continuous positive airway pressure, respiratory therapy assess nares and determine need for appliance change or resting period.  Outcome: Progressing     Problem: ABCDS Injury Assessment  Goal: Absence of physical injury  Outcome: Progressing     Problem: Confusion  Goal: Confusion, delirium, dementia, or psychosis is improved or at baseline  Description: INTERVENTIONS:  1. Assess for possible contributors to thought disturbance, including medications, impaired vision or hearing, underlying metabolic abnormalities, dehydration, psychiatric diagnoses, and notify attending LIP  2. [place] high risk fall precautions, as indicated  3. Provide frequent short contacts to provide reality reorientation, refocusing and direction  4. Decrease environmental stimuli, including noise as appropriate  5. Monitor and intervene to maintain adequate nutrition, hydration, elimination, sleep and activity  6. If unable to ensure safety without constant attention obtain sitter and review sitter guidelines with assigned personnel  7. Initiate Psychosocial CNS and Spiritual Care consult, as indicated  Outcome: Progressing

## 2024-05-15 NOTE — CARE COORDINATION
Notified Dr Welch via PS of need for orders for a standard w/c and a semi-electric hospital bed and documentation to be placed in a progress note. TE

## 2024-05-15 NOTE — PROGRESS NOTES
She is alert. Mental status is at baseline. She is disoriented.   Psychiatric:         Mood and Affect: Mood normal.         Behavior: Behavior normal.         Thought Content: Thought content normal.         Judgment: Judgment normal.         Medications:   Medications:    [Held by provider] metoprolol  5 mg IntraVENous Q6H    metoprolol tartrate  50 mg Oral BID    cefepime  2,000 mg IntraVENous Q24H    aspirin  81 mg Oral Daily    clopidogrel  75 mg Oral Daily    dilTIAZem  240 mg Oral Daily    folic acid  1 mg Oral Daily    furosemide  20 mg Oral Every Other Day    levothyroxine  75 mcg Oral Daily    pantoprazole  20 mg Oral QAM AC    heparin (porcine)  5,000 Units SubCUTAneous 3 times per day      Infusions:       PRN Meds: ondansetron, 4 mg, Q8H PRN   Or  ondansetron, 4 mg, Q6H PRN  acetaminophen, 650 mg, Q6H PRN   Or  acetaminophen, 650 mg, Q6H PRN        Labs      Recent Results (from the past 24 hour(s))   Basic Metabolic Panel w/ Reflex to MG    Collection Time: 05/15/24  1:53 AM   Result Value Ref Range    Sodium 140 135 - 145 MMOL/L    Potassium 4.5 3.5 - 5.1 MMOL/L    Chloride 109 99 - 110 mMol/L    CO2 23 21 - 32 MMOL/L    Anion Gap 8 7 - 16    Glucose 108 (H) 70 - 99 MG/DL    BUN 13 6 - 23 MG/DL    Creatinine 0.8 0.6 - 1.1 MG/DL    Est, Glom Filt Rate 69 >60 mL/min/1.73m2    Calcium 7.6 (L) 8.3 - 10.6 MG/DL   CBC with Auto Differential    Collection Time: 05/15/24  1:53 AM   Result Value Ref Range    WBC 12.3 (H) 4.0 - 10.5 K/CU MM    RBC 2.80 (L) 4.2 - 5.4 M/CU MM    Hemoglobin 8.6 (L) 12.5 - 16.0 GM/DL    Hematocrit 28.5 (L) 37 - 47 %    .8 (H) 78 - 100 FL    MCH 30.7 27 - 31 PG    MCHC 30.2 (L) 32.0 - 36.0 %    RDW 16.9 (H) 11.7 - 14.9 %    Platelets 344 140 - 440 K/CU MM    MPV 9.2 7.5 - 11.1 FL    Differential Type AUTOMATED DIFFERENTIAL     Neutrophils % 56.8 36 - 66 %    Lymphocytes % 24.6 24 - 44 %    Monocytes % 10.8 (H) 0 - 4 %    Eosinophils % 5.4 (H) 0 - 3 %    Basophils % 0.5 0 - 1  punctuation, and spelling, as well as words and phrases that may be inappropriate. If there are any questions or concerns please feel free to contact the dictating provider for clarification.     Electronically signed by Ortiz Welch MD on 5/15/2024 at 8:45 AM

## 2024-05-15 NOTE — PROGRESS NOTES
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 I have reviewed the hospital care given to date and reviewed all pertinent labs and imaging.I have spoken with patient, nursing staff and provided written and verbal instructions .The above note has been reviewed     CARDIOLOGY ATTENDING ADDENDUM    HPI:  I have reviewed the above HPI  And agree with above     Pulse Range: Pulse  Av.3  Min: 80  Max: 97    Blood Presuure Range:  Systolic (24hrs), Av , Min:109 , Max:137   ; Diastolic (24hrs), Av, Min:59, Max:89      Pulse ox Range: SpO2  Av %  Min: 96 %  Max: 96 %    24hr I & O:    Intake/Output Summary (Last 24 hours) at 5/15/2024 1337  Last data filed at 5/15/2024 0839  Gross per 24 hour   Intake 660 ml   Output 1475 ml   Net -815 ml         /72   Pulse 80   Temp 97.5 °F (36.4 °C) (Oral)   Resp 18   Ht 1.499 m (4' 11\")   Wt 53 kg (116 lb 13.5 oz)   SpO2 96%   BMI 23.60 kg/m²       Physical Exam:  General:  Awake, alert, NAD  Head:normal  Eye:normal  Neck:  No JVD   Chest:  Clear to auscultation, respiration easy  Cardiovascular:  RRR S1S2  Abdomen:   nontender  Extremities:  tr edema  Pulses; palpable  Neuro: grossly normal      MEDICAL DECISION MAKING;    Pt assessed , chart reviewed, patient examined

## 2024-05-15 NOTE — CARE COORDINATION
Met with pt's nieces at bedside to verify the d/c plan is still home with [facility]. Pt is confused d/t dementia.  Introduced self, updated wb and explained role of CM.  They verified that the plan is still home with 24/7 care provided by family and [facility]. CM asked Dr when he plans to d/c pt.  He informed CM that he plans to d/c pt tomorrow or Friday. CM notified Sepideh/Lou CROCKER.  She states that they can have the bed & wc delivered tomorrow between 1&5.  CM notified niece and she states that is fine. D/c plan is home with 24/7 care provided by family and [facility]. Hospital bed and w/c will be delivered tomorrow between 1&5. Niece's deny any other d/c needs.  Notify CM if any new d/c needs arise.  TE    .Upon discharge pt will be going home with [facility] home care  Please fax H/P, D/S.  AVS, order, and face sheet to [phone]  Please call [phone] and inform of discharge

## 2024-05-16 LAB
ANION GAP SERPL CALCULATED.3IONS-SCNC: 10 MMOL/L (ref 7–16)
BASOPHILS ABSOLUTE: 0.1 K/CU MM
BASOPHILS RELATIVE PERCENT: 0.5 % (ref 0–1)
BUN SERPL-MCNC: 10 MG/DL (ref 6–23)
CALCIUM SERPL-MCNC: 8.1 MG/DL (ref 8.3–10.6)
CHLORIDE BLD-SCNC: 107 MMOL/L (ref 99–110)
CO2: 23 MMOL/L (ref 21–32)
CREAT SERPL-MCNC: 0.8 MG/DL (ref 0.6–1.1)
CULTURE: ABNORMAL
CULTURE: ABNORMAL
DIFFERENTIAL TYPE: ABNORMAL
EOSINOPHILS ABSOLUTE: 0.6 K/CU MM
EOSINOPHILS RELATIVE PERCENT: 3.4 % (ref 0–3)
GFR, ESTIMATED: 69 ML/MIN/1.73M2
GLUCOSE BLD-MCNC: 126 MG/DL (ref 70–99)
GLUCOSE SERPL-MCNC: 120 MG/DL (ref 70–99)
HCT VFR BLD CALC: 28.8 % (ref 37–47)
HEMOGLOBIN: 9 GM/DL (ref 12.5–16)
IMMATURE NEUTROPHIL %: 1.9 % (ref 0–0.43)
LYMPHOCYTES ABSOLUTE: 2.8 K/CU MM
LYMPHOCYTES RELATIVE PERCENT: 17.6 % (ref 24–44)
Lab: ABNORMAL
MCH RBC QN AUTO: 31.6 PG (ref 27–31)
MCHC RBC AUTO-ENTMCNC: 31.3 % (ref 32–36)
MCV RBC AUTO: 101.1 FL (ref 78–100)
MONOCYTES ABSOLUTE: 1.5 K/CU MM
MONOCYTES RELATIVE PERCENT: 9.2 % (ref 0–4)
NEUTROPHILS ABSOLUTE: 10.8 K/CU MM
NEUTROPHILS RELATIVE PERCENT: 67.4 % (ref 36–66)
NUCLEATED RBC %: 0 %
PDW BLD-RTO: 17.2 % (ref 11.7–14.9)
PLATELET # BLD: 415 K/CU MM (ref 140–440)
PMV BLD AUTO: 8.9 FL (ref 7.5–11.1)
POTASSIUM SERPL-SCNC: 4 MMOL/L (ref 3.5–5.1)
RBC # BLD: 2.85 M/CU MM (ref 4.2–5.4)
SODIUM BLD-SCNC: 140 MMOL/L (ref 135–145)
SPECIMEN: ABNORMAL
TOTAL IMMATURE NEUTOROPHIL: 0.3 K/CU MM
TOTAL NUCLEATED RBC: 0 K/CU MM
WBC # BLD: 16 K/CU MM (ref 4–10.5)

## 2024-05-16 PROCEDURE — 2140000000 HC CCU INTERMEDIATE R&B

## 2024-05-16 PROCEDURE — 99232 SBSQ HOSP IP/OBS MODERATE 35: CPT | Performed by: INTERNAL MEDICINE

## 2024-05-16 PROCEDURE — 80048 BASIC METABOLIC PNL TOTAL CA: CPT

## 2024-05-16 PROCEDURE — 94761 N-INVAS EAR/PLS OXIMETRY MLT: CPT

## 2024-05-16 PROCEDURE — 6360000002 HC RX W HCPCS: Performed by: INTERNAL MEDICINE

## 2024-05-16 PROCEDURE — 2580000003 HC RX 258: Performed by: INTERNAL MEDICINE

## 2024-05-16 PROCEDURE — 6370000000 HC RX 637 (ALT 250 FOR IP): Performed by: STUDENT IN AN ORGANIZED HEALTH CARE EDUCATION/TRAINING PROGRAM

## 2024-05-16 PROCEDURE — 82962 GLUCOSE BLOOD TEST: CPT

## 2024-05-16 PROCEDURE — 6360000002 HC RX W HCPCS: Performed by: STUDENT IN AN ORGANIZED HEALTH CARE EDUCATION/TRAINING PROGRAM

## 2024-05-16 PROCEDURE — APPNB15 APP NON BILLABLE TIME 0-15 MINS: Performed by: NURSE PRACTITIONER

## 2024-05-16 PROCEDURE — 2580000003 HC RX 258: Performed by: STUDENT IN AN ORGANIZED HEALTH CARE EDUCATION/TRAINING PROGRAM

## 2024-05-16 PROCEDURE — 6370000000 HC RX 637 (ALT 250 FOR IP): Performed by: INTERNAL MEDICINE

## 2024-05-16 PROCEDURE — 36415 COLL VENOUS BLD VENIPUNCTURE: CPT

## 2024-05-16 PROCEDURE — 85025 COMPLETE CBC W/AUTO DIFF WBC: CPT

## 2024-05-16 RX ADMIN — METOPROLOL TARTRATE 50 MG: 50 TABLET, FILM COATED ORAL at 21:00

## 2024-05-16 RX ADMIN — HEPARIN SODIUM 5000 UNITS: 5000 INJECTION INTRAVENOUS; SUBCUTANEOUS at 15:00

## 2024-05-16 RX ADMIN — DILTIAZEM HYDROCHLORIDE 60 MG: 60 TABLET ORAL at 05:39

## 2024-05-16 RX ADMIN — DILTIAZEM HYDROCHLORIDE 60 MG: 60 TABLET ORAL at 15:43

## 2024-05-16 RX ADMIN — CEFEPIME 2000 MG: 2 INJECTION, POWDER, FOR SOLUTION INTRAVENOUS at 10:38

## 2024-05-16 RX ADMIN — PANTOPRAZOLE SODIUM 20 MG: 20 TABLET, DELAYED RELEASE ORAL at 05:39

## 2024-05-16 RX ADMIN — CLOPIDOGREL BISULFATE 75 MG: 75 TABLET ORAL at 15:46

## 2024-05-16 RX ADMIN — FOLIC ACID 1 MG: 1 TABLET ORAL at 15:47

## 2024-05-16 RX ADMIN — HEPARIN SODIUM 5000 UNITS: 5000 INJECTION INTRAVENOUS; SUBCUTANEOUS at 21:00

## 2024-05-16 RX ADMIN — LEVOTHYROXINE SODIUM 75 MCG: 0.07 TABLET ORAL at 05:39

## 2024-05-16 RX ADMIN — HEPARIN SODIUM 5000 UNITS: 5000 INJECTION INTRAVENOUS; SUBCUTANEOUS at 05:39

## 2024-05-16 RX ADMIN — ASPIRIN 81 MG: 81 TABLET, CHEWABLE ORAL at 15:47

## 2024-05-16 RX ADMIN — MEROPENEM 1000 MG: 1 INJECTION, POWDER, FOR SOLUTION INTRAVENOUS at 15:03

## 2024-05-16 ASSESSMENT — PAIN SCALES - GENERAL
PAINLEVEL_OUTOF10: 0

## 2024-05-16 NOTE — CARE COORDINATION
[facility] Liaison spoke with pts CG Vendal and verified dem info. She is agreeable to [facility] and has had [facility] prior to this hosp stay. And will have services resumed at discharge which planned for 5/17. [facility] and to [facility] for start of services poss tomorrow afternoon.  Please place inpatient consult to home health needs order in Epic at [place].

## 2024-05-16 NOTE — PLAN OF CARE
Problem: Discharge Planning  Goal: Discharge to home or other facility with appropriate resources  Outcome: Progressing     Problem: Safety - Adult  Goal: Free from fall injury  Outcome: Progressing     Problem: Skin/Tissue Integrity  Goal: Absence of new skin breakdown  Description: 1.  Monitor for areas of redness and/or skin breakdown  2.  Assess vascular access sites hourly  3.  Every 4-6 hours minimum:  Change oxygen saturation probe site  4.  Every 4-6 hours:  If on nasal continuous positive airway pressure, respiratory therapy assess nares and determine need for appliance change or resting period.  Outcome: Progressing     Problem: Pain  Goal: Verbalizes/displays adequate comfort level or baseline comfort level  Outcome: Progressing     Problem: ABCDS Injury Assessment  Goal: Absence of physical injury  Outcome: Progressing     Problem: Confusion  Goal: Confusion, delirium, dementia, or psychosis is improved or at baseline  Description: INTERVENTIONS:  1. Assess for possible contributors to thought disturbance, including medications, impaired vision or hearing, underlying metabolic abnormalities, dehydration, psychiatric diagnoses, and notify attending LIP  2. [place] high risk fall precautions, as indicated  3. Provide frequent short contacts to provide reality reorientation, refocusing and direction  4. Decrease environmental stimuli, including noise as appropriate  5. Monitor and intervene to maintain adequate nutrition, hydration, elimination, sleep and activity  6. If unable to ensure safety without constant attention obtain sitter and review sitter guidelines with assigned personnel  7. Initiate Psychosocial CNS and Spiritual Care consult, as indicated  Outcome: Progressing     Problem: Chronic Conditions and Co-morbidities  Goal: Patient's chronic conditions and co-morbidity symptoms are monitored and maintained or improved  Outcome: Progressing

## 2024-05-16 NOTE — PROGRESS NOTES
Cardiology note     Sign off - will be available if needed    Cardiology consult note for : atrial fib     Assessment: very [place] -feels lightheaded.    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 I have reviewed the hospital care given to date and reviewed all pertinent labs and imaging.I have spoken with patient, nursing staff and provided written and verbal instructions .The above note has been reviewed     CARDIOLOGY ATTENDING ADDENDUM    HPI:  I have reviewed the above HPI  And agree with above     Pulse Range: Pulse  Av.1  Min: 77  Max: 122    Blood Presuure Range:  Systolic (24hrs), Av , Min:114 , Max:156   ; Diastolic (24hrs), Av, Min:65, Max:81      Pulse ox Range: SpO2  Av.3 %  Min: 95 %  Max: 98 %    24hr I & O:    Intake/Output Summary (Last 24 hours) at 2024 1219  Last data filed at 5/15/2024 1620  Gross per 24 hour   Intake --   Output 500 ml   Net -500 ml         /67   Pulse 82   Temp 97.5 °F (36.4 °C) (Oral)   Resp 17   Ht 1.499 m (4' 11\")   Wt 51.3 kg (113 lb 1.6 oz)   SpO2 96%   BMI 22.84 kg/m²       Physical Exam:  General:  Awake, alert, NAD  Head:normal  Eye:normal  Neck:  No JVD   Chest:  Clear to auscultation, respiration easy  Cardiovascular:  RRR S1S2  Abdomen:   nontender  Extremities:  tr edema  Pulses; palpable  Neuro: grossly

## 2024-05-16 NOTE — PROGRESS NOTES
V2.0  [facility] Hospitalist Progress Note      Name:  Breezy Hernández /Age/Sex: 3/26/1931  (89 y.o. female)   MRN & CSN:  [id number] & [id number] Encounter Date/Time: 2024 12:20 PM EDT    Location:  [facility]/3103-A PCP: Sharmila Driscoll APRN - NP       Hospital Day: 5    Assessment and Plan:   Breezy Hernández is a 89 y.o. female  who presents with Sepsis (HCC)    Patient awaiting antibiotics to be set up at home.  I also had an extensive discussion with the patient's POA Boyd (niece) and introduced the topic of hospice care.  Patient's family wish for patient to return home and do not wish for any aggressive measures or place her in a facility.  I explained the services hospice can provide and patient's niece appears to be interested.  Hospice consult placed in chart.    Plan:  Sepsis POA  Suspected urinary tract infection   Prostatic joint infection  CT L. Femur concerning for fluid collection which is concerning for abscess.  UA also consistent with infection.  IV antibiotics: Vancomycin plus cefepime  Consult infectious disease  Follow up inflammatory markers, blood and urine culture.  Local wound care,   consult orthopedic surgery, no concern for surgical site infection, continue PT/OT  Urine culture growing E. Coli, sensitivities reported  [facility]  [facility] following, recommend 10-day course of meropenem     Paroxysmal A-fib with RVR, likely secondary to above-resolved  Rapid response called on arrival, HR sustaining 150s hemodynamically stable  Transferred to stepdown, IV  mL bolus, IV magnesium 2 g and IV Cardizem 5 mg once.  Resume home Cardizem and Lopressor.  Eliquis was recently discontinued and started on aspirin and Plavix due to anemia  Cardiology consulted, appreciate recs     Concern for ovarian neoplasm  Noted on previous imaging but does not want to pursue further investigation     Chronic systolic and diastolic heart failure  Clinically hypovolemic  Most

## 2024-05-16 NOTE — CARE COORDINATION
Noted Hospice consult.  discussed hospice with niece per note.  SVEN called pt's niece/Vendal for Hospice choice.  She has requested [facility].  She states that they still want to take pt home. SVEN informed her of the hospital affiliation with them.  Referral made to [place], she will reach out to niece to arrange a meeting time.  TE

## 2024-05-16 NOTE — CARE COORDINATION
Dr Welch informed CM that pt will be discharged on IV meropenem q12 for 10 days.  CM notified pt's niece/Vendel and she states that they are able to learn how to give the IV atb.  Notified [facility] liaison/Layla via PS.  Notified  of need for iv atb directions to be placed in the C order and of need for printed script today so that Layla/[facility] can order the IV atb and supplies.  Also informed  that pt will need a PICC or midline.  [facility] liaison/Layla will arrange the IV atb and will notify this CM of delivery and when they will be able to see pt, which will not be today.  TE

## 2024-05-16 NOTE — PROGRESS NOTES
Infectious Disease Progress Note  2024   Patient Name: Breezy Hernández : 3/26/1931     Assessment  ESBL E coli CAUTI (POA)  Recently discharged from the hospital on 2024 following admission for left femoral neck fracture after a mechanical fall.  Left hip intramedullary nail was inserted on 2024.  She returned on 2024 with confusion.  Left hip surgical incision does not appear infected.  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  History:?Interval history noted  Denies n/v/d/f or untoward effects of antimicrobials  Physical Exam:  Vital Signs: /67   Pulse 82   Temp 97.5 °F (36.4 °C) (Oral)   Resp 17   Ht 1.499 m (4' 11\")   Wt 51.3 kg (113 lb 1.6 oz)   SpO2 96%   BMI 22.84 kg/m²     Gen: responds to voice  Skin: no stigmata of endocarditis  Wounds: left hip staples are intact, no discharge, surrounding edema, no marked erythema  HEMT: AT/NC Oropharynx pink, moist, and without lesions or exudates;  Eyes: PERRLA, EOMI, conjunctiva pink, sclera anicteric.   Neck: Supple. Trachea midline. No LAD.  Chest: no distress and CTA. Good air movement.  Heart: RRR and no MRG.   Abd: soft, non-distended, no tenderness, no hepatomegaly. Normoactive bowel sounds.  Ext: no clubbing, cyanosis, or edema  Catheter Site: without erythema or tenderness  LDA:   Neuro: rouses to voice     Radiologic / Imaging / TESTING  No results found.     Labs:    Recent Results (from the past 24 hour(s))   Basic Metabolic Panel w/ Reflex to MG    Collection Time: 24  2:37 AM   Result Value Ref Range    Sodium 140 135 - 145 MMOL/L    Potassium 4.0 3.5 - 5.1 MMOL/L    Chloride 107 99 - 110 mMol/L

## 2024-05-16 NOTE — PROGRESS NOTES
05/16/24 1417   Encounter Summary   Encounter Overview/Reason Initial Encounter   Service Provided For Patient   Referral/Consult From [facility]   Support System Family members   Last Encounter  05/16/24  (Patient was in good spirit and requested prayer support. Patient did not express any other needs at this time.)   Complexity of Encounter Low   Begin Time 1400   End Time  1415   Total Time Calculated 15 min   Spiritual/Emotional needs   Type Spiritual Support   Grief, Loss, and Adjustments   Type Adjustment to illness   Assessment/Intervention/Outcome   Assessment Calm;Coping;Peaceful   Intervention Active listening;Empowerment;Prayer (assurance of)/[place]

## 2024-05-17 VITALS
WEIGHT: 97.6 LBS | BODY MASS INDEX: 19.68 KG/M2 | HEIGHT: 59 IN | HEART RATE: 78 BPM | RESPIRATION RATE: 17 BRPM | TEMPERATURE: 97.7 F | OXYGEN SATURATION: 98 % | DIASTOLIC BLOOD PRESSURE: 73 MMHG | SYSTOLIC BLOOD PRESSURE: 125 MMHG

## 2024-05-17 LAB
ANION GAP SERPL CALCULATED.3IONS-SCNC: 8 MMOL/L (ref 7–16)
BASOPHILS ABSOLUTE: 0.1 K/CU MM
BASOPHILS RELATIVE PERCENT: 0.6 % (ref 0–1)
BUN SERPL-MCNC: 10 MG/DL (ref 6–23)
CALCIUM SERPL-MCNC: 8 MG/DL (ref 8.3–10.6)
CHLORIDE BLD-SCNC: 107 MMOL/L (ref 99–110)
CO2: 26 MMOL/L (ref 21–32)
CREAT SERPL-MCNC: 0.8 MG/DL (ref 0.6–1.1)
DIFFERENTIAL TYPE: ABNORMAL
EOSINOPHILS ABSOLUTE: 0.6 K/CU MM
EOSINOPHILS RELATIVE PERCENT: 4.2 % (ref 0–3)
GFR, ESTIMATED: 69 ML/MIN/1.73M2
GLUCOSE SERPL-MCNC: 105 MG/DL (ref 70–99)
HCT VFR BLD CALC: 28.5 % (ref 37–47)
HEMOGLOBIN: 8.7 GM/DL (ref 12.5–16)
IMMATURE NEUTROPHIL %: 1.8 % (ref 0–0.43)
LYMPHOCYTES ABSOLUTE: 2.7 K/CU MM
LYMPHOCYTES RELATIVE PERCENT: 19.6 % (ref 24–44)
MCH RBC QN AUTO: 30.7 PG (ref 27–31)
MCHC RBC AUTO-ENTMCNC: 30.5 % (ref 32–36)
MCV RBC AUTO: 100.7 FL (ref 78–100)
MONOCYTES ABSOLUTE: 1.3 K/CU MM
MONOCYTES RELATIVE PERCENT: 9.3 % (ref 0–4)
NEUTROPHILS ABSOLUTE: 9 K/CU MM
NEUTROPHILS RELATIVE PERCENT: 64.5 % (ref 36–66)
NUCLEATED RBC %: 0 %
PDW BLD-RTO: 18 % (ref 11.7–14.9)
PLATELET # BLD: 406 K/CU MM (ref 140–440)
PMV BLD AUTO: 9.2 FL (ref 7.5–11.1)
POTASSIUM SERPL-SCNC: 4.3 MMOL/L (ref 3.5–5.1)
RBC # BLD: 2.83 M/CU MM (ref 4.2–5.4)
SODIUM BLD-SCNC: 141 MMOL/L (ref 135–145)
TOTAL IMMATURE NEUTOROPHIL: 0.25 K/CU MM
TOTAL NUCLEATED RBC: 0 K/CU MM
WBC # BLD: 14 K/CU MM (ref 4–10.5)

## 2024-05-17 PROCEDURE — C1751 CATH, INF, PER/CENT/MIDLINE: HCPCS

## 2024-05-17 PROCEDURE — 6370000000 HC RX 637 (ALT 250 FOR IP): Performed by: INTERNAL MEDICINE

## 2024-05-17 PROCEDURE — 76937 US GUIDE VASCULAR ACCESS: CPT

## 2024-05-17 PROCEDURE — 85025 COMPLETE CBC W/AUTO DIFF WBC: CPT

## 2024-05-17 PROCEDURE — 2580000003 HC RX 258: Performed by: INTERNAL MEDICINE

## 2024-05-17 PROCEDURE — 2709999900 HC NON-CHARGEABLE SUPPLY

## 2024-05-17 PROCEDURE — 36415 COLL VENOUS BLD VENIPUNCTURE: CPT

## 2024-05-17 PROCEDURE — 05HD33Z INSERTION OF INFUSION DEVICE INTO RIGHT CEPHALIC VEIN, PERCUTANEOUS APPROACH: ICD-10-PCS | Performed by: STUDENT IN AN ORGANIZED HEALTH CARE EDUCATION/TRAINING PROGRAM

## 2024-05-17 PROCEDURE — 36410 VNPNXR 3YR/> PHY/QHP DX/THER: CPT

## 2024-05-17 PROCEDURE — 6360000002 HC RX W HCPCS: Performed by: INTERNAL MEDICINE

## 2024-05-17 PROCEDURE — 6360000002 HC RX W HCPCS: Performed by: STUDENT IN AN ORGANIZED HEALTH CARE EDUCATION/TRAINING PROGRAM

## 2024-05-17 PROCEDURE — 80048 BASIC METABOLIC PNL TOTAL CA: CPT

## 2024-05-17 PROCEDURE — 6370000000 HC RX 637 (ALT 250 FOR IP): Performed by: STUDENT IN AN ORGANIZED HEALTH CARE EDUCATION/TRAINING PROGRAM

## 2024-05-17 RX ORDER — HEPARIN SODIUM 5000 [USP'U]/ML
5000 INJECTION, SOLUTION INTRAVENOUS; SUBCUTANEOUS 2 TIMES DAILY
Status: DISCONTINUED | OUTPATIENT
Start: 2024-05-17 | End: 2024-05-17 | Stop reason: HOSPADM

## 2024-05-17 RX ADMIN — MEROPENEM 1000 MG: 1 INJECTION, POWDER, FOR SOLUTION INTRAVENOUS at 00:20

## 2024-05-17 RX ADMIN — DILTIAZEM HYDROCHLORIDE 60 MG: 60 TABLET ORAL at 00:17

## 2024-05-17 RX ADMIN — MEROPENEM 1000 MG: 1 INJECTION, POWDER, FOR SOLUTION INTRAVENOUS at 12:28

## 2024-05-17 RX ADMIN — ASPIRIN 81 MG: 81 TABLET, CHEWABLE ORAL at 10:03

## 2024-05-17 RX ADMIN — FOLIC ACID 1 MG: 1 TABLET ORAL at 10:03

## 2024-05-17 RX ADMIN — HEPARIN SODIUM 5000 UNITS: 5000 INJECTION INTRAVENOUS; SUBCUTANEOUS at 05:10

## 2024-05-17 RX ADMIN — FUROSEMIDE 20 MG: 20 TABLET ORAL at 10:03

## 2024-05-17 RX ADMIN — DILTIAZEM HYDROCHLORIDE 60 MG: 60 TABLET ORAL at 05:10

## 2024-05-17 RX ADMIN — METOPROLOL TARTRATE 50 MG: 50 TABLET, FILM COATED ORAL at 10:03

## 2024-05-17 RX ADMIN — CLOPIDOGREL BISULFATE 75 MG: 75 TABLET ORAL at 10:03

## 2024-05-17 RX ADMIN — DILTIAZEM HYDROCHLORIDE 60 MG: 60 TABLET ORAL at 12:29

## 2024-05-17 RX ADMIN — LEVOTHYROXINE SODIUM 75 MCG: 0.07 TABLET ORAL at 05:10

## 2024-05-17 RX ADMIN — PANTOPRAZOLE SODIUM 20 MG: 20 TABLET, DELAYED RELEASE ORAL at 05:10

## 2024-05-17 ASSESSMENT — PAIN SCALES - GENERAL
PAINLEVEL_OUTOF10: 0

## 2024-05-17 NOTE — CARE COORDINATION
Spoke with Vendal reviewed co-pay for atbs with her. She verbalized understanding and is agreeable to learn the atb infusion. Will plan for delivery.

## 2024-05-17 NOTE — PLAN OF CARE
Problem: Discharge Planning  Goal: Discharge to home or other facility with appropriate resources  5/17/2024 1601 by Taylor Nunez RN  Outcome: Adequate for Discharge  5/17/2024 1315 by Taylor Nunez RN  Outcome: Progressing     Problem: Safety - Adult  Goal: Free from fall injury  5/17/2024 1601 by Taylor Nunez RN  Outcome: Adequate for Discharge  5/17/2024 1315 by Taylor Nunez RN  Outcome: Progressing     Problem: Skin/Tissue Integrity  Goal: Absence of new skin breakdown  Description: 1.  Monitor for areas of redness and/or skin breakdown  2.  Assess vascular access sites hourly  3.  Every 4-6 hours minimum:  Change oxygen saturation probe site  4.  Every 4-6 hours:  If on nasal continuous positive airway pressure, respiratory therapy assess nares and determine need for appliance change or resting period.  5/17/2024 1601 by Taylor Nunez RN  Outcome: Adequate for Discharge  5/17/2024 1315 by Taylor Nunez RN  Outcome: Progressing     Problem: Pain  Goal: Verbalizes/displays adequate comfort level or baseline comfort level  5/17/2024 1601 by Taylor Nunez RN  Outcome: Adequate for Discharge  5/17/2024 1315 by Taylor Nunez RN  Outcome: Progressing     Problem: ABCDS Injury Assessment  Goal: Absence of physical injury  5/17/2024 1601 by Taylor Nunez RN  Outcome: Adequate for Discharge  5/17/2024 1315 by Taylor Nunez RN  Outcome: Progressing     Problem: Confusion  Goal: Confusion, delirium, dementia, or psychosis is improved or at baseline  Description: INTERVENTIONS:  1. Assess for possible contributors to thought disturbance, including medications, impaired vision or hearing, underlying metabolic abnormalities, dehydration, psychiatric diagnoses, and notify attending LIP  2. [place] high risk fall precautions, as indicated  3. Provide frequent short contacts to provide reality reorientation, refocusing and direction  4. Decrease environmental stimuli, including noise as appropriate  5. Monitor  and intervene to maintain adequate nutrition, hydration, elimination, sleep and activity  6. If unable to ensure safety without constant attention obtain sitter and review sitter guidelines with assigned personnel  7. Initiate Psychosocial CNS and Spiritual Care consult, as indicated  5/17/2024 1601 by Taylor Nunez RN  Outcome: Adequate for Discharge  5/17/2024 1315 by Taylor Nunez RN  Outcome: Progressing     Problem: Chronic Conditions and Co-morbidities  Goal: Patient's chronic conditions and co-morbidity symptoms are monitored and maintained or improved  5/17/2024 1601 by Taylor Nunez RN  Outcome: Adequate for Discharge  5/17/2024 1315 by Taylor Nunez, RN  Outcome: Progressing

## 2024-05-17 NOTE — CARE COORDINATION
Spoke with nurse Vazquez and stated to give afternoon dose of atb before etc. She verbalized understanding... notified [facility] that pt will be dc'd and can be seen tomorrow. Emailed [employer] to delivery tonight. Called katherine Thor to notify of co-pay and pt will be home later and she will get a call from [employer] regarding delivery. She verbalized understanding. [facility].

## 2024-05-17 NOTE — PROGRESS NOTES
Pharmacist Review and Automatic Dose Adjustment of Prophylactic Enoxaparin    Reviewed reason(s) for admission/hospital problem list    The reviewing pharmacist has made an adjustment to the ordered enoxaparin dose or converted to UFH per the approved [facility] protocol and table as identified below.        Breezy Hernández is a 89 y.o. female.     Recent Labs     05/15/24  0153 05/16/24  0237 05/17/24  0230   CREATININE 0.8 0.8 0.8       Estimated Creatinine Clearance: 31 mL/min (based on SCr of 0.8 mg/dL).    Recent Labs     05/16/24  0237 05/17/24  0230   HGB 9.0* 8.7*   HCT 28.8* 28.5*    406     No results for input(s): \"INR\" in the last 72 hours.    Height:   Ht Readings from Last 1 Encounters:   05/13/24 1.499 m (4' 11\")     Weight:  Wt Readings from Last 1 Encounters:   05/17/24 44.3 kg (97 lb 9.6 oz)               Plan: Based upon the patient's weight and renal function    Ordered: Heparin 5,000 units SUBQ TID    Changed/converted to    New Order: Heparin 5,000 units SUBQ BID      Thank you,  Hayde Thomas [facility]  5/17/2024, 9:43 AM

## 2024-05-17 NOTE — CARE COORDINATION
Called [facility] to check on meeting time.  Spoke with Estefania and she informed CM that the family wants pt discharged and wants Hospice to meet with them tomorrow at their home.  IV atb is set up and supplies will be delivered today per Layla.  Pt needs to have her 1300 dose prior to d/c. Pt has a Midline.   notified Dr Welch via PS of plan and okay to d/c pt today if she is still medically stable. Notified pts nurse that pt will need to have her 1300 dose of IV meropenem prior to d/c. TE

## 2024-05-17 NOTE — DISCHARGE SUMMARY
V2.0  Discharge Summary    Name:  Breezy Hernández /Age/Sex: 3/26/1931 (89 y.o. female)   Admit Date: 2024  Discharge Date: 24    MRN & CSN:  [id number] & [id number] Encounter Date and Time 24 8:50 AM EDT    Attending:  Ortiz Welch MD Discharging Provider: Ortiz Welch MD       Hospital Course:     Brief HPI: Breezy Hernández is a 89 y.o. female who presented with sepsis    Brief Problem Based Course:   Sepsis POA  Suspected urinary tract infection   Prostatic joint infection  CT L. Femur concerning for fluid collection which is concerning for abscess.  UA also consistent with infection on admission  IV antibiotics: Vancomycin plus cefepime  Consult infectious disease  consult orthopedic surgery, no concern for surgical site infection, continue PT/OT  Urine culture growing E. Coli, sensitivities reported  Follow-up blood cultures from  [facility]  ID following, recommend 10-day course of meropenem     Paroxysmal A-fib with RVR, likely secondary to above-resolved  Rapid response called on arrival, HR sustaining 150s hemodynamically stable  S/p IV  mL bolus, IV magnesium 2 g and IV Cardizem 5 mg once.  Resume home Cardizem and Lopressor.  Eliquis was recently discontinued and started on aspirin and Plavix due to anemia  Cardiology consulted, appreciate recs     Concern for ovarian neoplasm  Noted on previous imaging but does not want to pursue further investigation     Chronic systolic and diastolic heart failure  Clinically hypovolemic  Most recent echo 2023 EF 45 to 50%, grade 2 diastolic dysfunction, global hypokinesis, moderate AR, mild TR, trace NM, mild to moderate MR  Continue home Lasix  Continue Lopressor     Anemia of chronic disease  Hb 8.8, improving from most recent admission  No evidence of overt bleeding     CKD stage 3a  Cr 0.9 at baseline     Hypothyroidism  Continue home Synthroid     GERD  Protonix      The patient expressed appropriate understanding of, and agreement with the     WBCUA 10 05/12/2024 03:00 PM    RBCUA 2 05/12/2024 03:00 PM    MUCUS 2+ 01/12/2022 06:15 PM    BACTERIA OCCASIONAL 05/12/2024 03:00 PM    CLARITYU CLEAR 05/12/2024 03:00 PM    LEUKOCYTESUR SMALL NUMBER OR AMOUNT OBSERVED 05/12/2024 03:00 PM    UROBILINOGEN 0.2 05/12/2024 03:00 PM    BILIRUBINUR NEGATIVE 05/12/2024 03:00 PM    BLOODU SMALL NUMBER OR AMOUNT OBSERVED 05/12/2024 03:00 PM    GLUCOSEU NEGATIVE 05/12/2024 03:00 PM    KETUA NEGATIVE 05/12/2024 03:00 PM     Urine Cultures: No results found for: \"LABURIN\"  Blood Cultures: No results found for: \"BC\"  No results found for: \"BLOODCULT2\"  Organism: No results found for: \"ORG\"    Time Spent Discharging patient 35 minutes    Electronically signed by Ortiz Welch MD on 5/17/2024 at 8:50 AM

## 2024-05-17 NOTE — CONSULTS
Consult completed.     Indication for line: Long term IV/antibiotic administration  Medication/Anticipated Duration: 10 Days (IV meropenem q12 for 10 days)   Ordering Provider: Ortiz Welch MD     B[employer] ACE Intravascular Catheter inserted into RUE cephalic vein x 2 attempt using sterile ultrasound technique. 1st attempt with PowerGlide PRO would not flush after insertion; removed to find line grossly kinked. 2nd Attempt- successful, in part d/t shorter length of catheter; patient body habitus favors smaller intravascular device. Brisk blood return noted and flushes without resistance. Patient tolerated procedure well. Ultrasound photos of vein diameter provided below. Primary nurse ARNOLD Rea notified and aware.    Consult the Vascular Access Team for questions, concerns, or change in patient's condition.

## 2024-05-17 NOTE — PLAN OF CARE
Problem: Pain  Goal: Verbalizes/displays adequate comfort level or baseline comfort level  5/17/2024 1602 by Taylor Nunez RN  Outcome: Completed  5/17/2024 1601 by Taylor Nunez RN  Outcome: Adequate for Discharge  5/17/2024 1315 by Taylor Nunez RN  Outcome: Progressing     Problem: ABCDS Injury Assessment  Goal: Absence of physical injury  5/17/2024 1602 by Taylor Nunez RN  Outcome: Completed  5/17/2024 1601 by Taylor Nunez RN  Outcome: Adequate for Discharge  5/17/2024 1315 by Taylor Nunez RN  Outcome: Progressing     Problem: Confusion  Goal: Confusion, delirium, dementia, or psychosis is improved or at baseline  Description: INTERVENTIONS:  1. Assess for possible contributors to thought disturbance, including medications, impaired vision or hearing, underlying metabolic abnormalities, dehydration, psychiatric diagnoses, and notify attending LIP  2. [place] high risk fall precautions, as indicated  3. Provide frequent short contacts to provide reality reorientation, refocusing and direction  4. Decrease environmental stimuli, including noise as appropriate  5. Monitor and intervene to maintain adequate nutrition, hydration, elimination, sleep and activity  6. If unable to ensure safety without constant attention obtain sitter and review sitter guidelines with assigned personnel  7. Initiate Psychosocial CNS and Spiritual Care consult, as indicated  5/17/2024 1602 by Taylor Nunez RN  Outcome: Completed  5/17/2024 1601 by Taylor Nunez RN  Outcome: Adequate for Discharge  5/17/2024 1315 by Taylor Nunez RN  Outcome: Progressing     Problem: Chronic Conditions and Co-morbidities  Goal: Patient's chronic conditions and co-morbidity symptoms are monitored and maintained or improved  5/17/2024 1602 by Taylor Nunez RN  Outcome: Completed  5/17/2024 1601 by Taylor Nunez RN  Outcome: Adequate for Discharge  5/17/2024 1315 by Taylor Nunez RN  Outcome: Progressing

## 2024-05-17 NOTE — PLAN OF CARE
Problem: Discharge Planning  Goal: Discharge to home or other facility with appropriate resources  Outcome: Progressing     Problem: Safety - Adult  Goal: Free from fall injury  Outcome: Progressing     Problem: Skin/Tissue Integrity  Goal: Absence of new skin breakdown  Description: 1.  Monitor for areas of redness and/or skin breakdown  2.  Assess vascular access sites hourly  3.  Every 4-6 hours minimum:  Change oxygen saturation probe site  4.  Every 4-6 hours:  If on nasal continuous positive airway pressure, respiratory therapy assess nares and determine need for appliance change or resting period.  Outcome: Progressing     Problem: Pain  Goal: Verbalizes/displays adequate comfort level or baseline comfort level  Outcome: Progressing     Problem: Confusion  Goal: Confusion, delirium, dementia, or psychosis is improved or at baseline  Description: INTERVENTIONS:  1. Assess for possible contributors to thought disturbance, including medications, impaired vision or hearing, underlying metabolic abnormalities, dehydration, psychiatric diagnoses, and notify attending LIP  2. [place] high risk fall precautions, as indicated  3. Provide frequent short contacts to provide reality reorientation, refocusing and direction  4. Decrease environmental stimuli, including noise as appropriate  5. Monitor and intervene to maintain adequate nutrition, hydration, elimination, sleep and activity  6. If unable to ensure safety without constant attention obtain sitter and review sitter guidelines with assigned personnel  7. Initiate Psychosocial CNS and Spiritual Care consult, as indicated  Outcome: Progressing     Problem: Chronic Conditions and Co-morbidities  Goal: Patient's chronic conditions and co-morbidity symptoms are monitored and maintained or improved  Outcome: Progressing

## 2024-05-18 LAB
CULTURE: NORMAL
CULTURE: NORMAL
Lab: NORMAL
Lab: NORMAL
SPECIMEN: NORMAL
SPECIMEN: NORMAL

## 2024-05-21 ENCOUNTER — OFFICE VISIT (OUTPATIENT)
Dept: ORTHOPEDIC SURGERY | Age: 89
End: 2024-05-21

## 2024-05-21 VITALS
TEMPERATURE: 97.4 F | SYSTOLIC BLOOD PRESSURE: 112 MMHG | OXYGEN SATURATION: 97 % | HEART RATE: 100 BPM | DIASTOLIC BLOOD PRESSURE: 82 MMHG

## 2024-05-21 DIAGNOSIS — Z09 POSTOP CHECK: Primary | ICD-10-CM

## 2024-05-21 PROCEDURE — 99024 POSTOP FOLLOW-UP VISIT: CPT | Performed by: STUDENT IN AN ORGANIZED HEALTH CARE EDUCATION/TRAINING PROGRAM

## 2024-05-21 NOTE — PROGRESS NOTES
Date of surgery:   May 5th   HIP INTRAMEDULLARY NAIL BK INSERTION   History:  Ms. Breezy Hernández is here in follow up regarding her left leg  She is doing rather well. She is having 0/10 pain. She denies chest pain, SOB, calf pain,fever,wound drainage.  No other issues.    Physical: She demonstrates appropriate mood and affect.   leftLeg exam:  The incisions are clean, dry, intact, and nontender with no erythema. She has no edema, the Leg compartments are soft . There are No cords or calf tenderness.  No significant calf/ankle edema.    Staples were removed without complications     Patient hard of hearing and not a good historian.

## 2024-05-21 NOTE — PROGRESS NOTES
Date of surgery: 5-5-2024     Procedure:  Left hip short intramedullary nailing      History:  Patient is here in follow up regarding their 2.5 week postop appointment    Patient is doing well. They have improving 3/10 pain. They deny chest pain, SOB, calf pain,fever,wound drainage.  No other issues.  Patient denies any constitutional symptoms.    She is accompanied by her family who I spoke to the hospital.    Patient states they have been compliant with restrictions.    Patient has been doing minimal ambulation and weightbearing.  She continues to use a wheelchair which is normal for her.    Patient has been taking ASA 81 mg as well as Plavix for DVT prophylaxis     Physical:   Patient demonstrates appropriate mood and affect.     Left lower extremity exam:   The incisions are well-approximated and are clean, dry, intact, and nontender with no erythema.  They do have minimal wound dehiscence at the superior edge of the most superior wound but no true gapping including with stressing after staples removed.  They have no edema, the Leg compartments are soft . There are No cords or calf tenderness.  No significant calf/ankle edema. They are neurovascularly intact distally.     Staples removed without issue    Range of motion of the left hip passively demonstrates no pain    No areas of tenderness to palpation    Negative Ivone's    Imaging:   3 views of the left hip in a skeletally mature patient demonstrates prior intertrochanteric femur fracture that remains well reduced, minimal change in displacement.  Short nail present with 2 screws for distal hardware.  Extension of fracture to the lateral cortex but remains minimally displaced.     Impression: Status post above, doing well        Plan:   -Imaging reviewed with patient and offered reassurance that although the fracture and implant are not ideal, it remains adequately placed as we discussed preoperatively and postoperatively.  -continue the PT

## 2024-05-21 NOTE — PATIENT INSTRUCTIONS
25%-50% weight bearing as tolerated.  NO soaking of the incision. May shower.  Follow up in 4 weeks.

## 2024-05-22 PROBLEM — Z09 POSTOP CHECK: Status: ACTIVE | Noted: 2024-05-22

## 2024-05-28 ENCOUNTER — TELEPHONE (OUTPATIENT)
Dept: INFECTIOUS DISEASES | Age: 89
End: 2024-05-28

## 2024-05-28 NOTE — TELEPHONE ENCOUNTER
Called from [facility] states pt is finished w/ABX 5/27/24.  Can the PICC line be pulled?  Advised to maintain the line until she hears back.  Please advise.

## 2024-05-30 ENCOUNTER — OFFICE VISIT (OUTPATIENT)
Dept: CARDIOLOGY CLINIC | Age: 89
End: 2024-05-30
Payer: MEDICARE

## 2024-05-30 ENCOUNTER — HOSPITAL ENCOUNTER (EMERGENCY)
Age: 89
Discharge: ANOTHER HOSPITAL | End: 2024-05-31
Attending: EMERGENCY MEDICINE
Payer: MEDICARE

## 2024-05-30 ENCOUNTER — APPOINTMENT (OUTPATIENT)
Dept: GENERAL RADIOLOGY | Age: 89
End: 2024-05-30
Payer: MEDICARE

## 2024-05-30 VITALS
SYSTOLIC BLOOD PRESSURE: 112 MMHG | WEIGHT: 97 LBS | BODY MASS INDEX: 19.56 KG/M2 | HEIGHT: 59 IN | DIASTOLIC BLOOD PRESSURE: 60 MMHG | HEART RATE: 160 BPM

## 2024-05-30 DIAGNOSIS — I10 ESSENTIAL HYPERTENSION: ICD-10-CM

## 2024-05-30 DIAGNOSIS — I48.0 PAROXYSMAL ATRIAL FIBRILLATION (HCC): Primary | ICD-10-CM

## 2024-05-30 DIAGNOSIS — I50.42 CHRONIC COMBINED SYSTOLIC (CONGESTIVE) AND DIASTOLIC (CONGESTIVE) HEART FAILURE (HCC): ICD-10-CM

## 2024-05-30 DIAGNOSIS — I48.91 ATRIAL FIBRILLATION WITH RVR (HCC): Primary | ICD-10-CM

## 2024-05-30 LAB
ALBUMIN SERPL-MCNC: 3.1 GM/DL (ref 3.4–5)
ALP BLD-CCNC: 304 IU/L (ref 40–129)
ALT SERPL-CCNC: 27 U/L (ref 10–40)
ANION GAP SERPL CALCULATED.3IONS-SCNC: 13 MMOL/L (ref 7–16)
AST SERPL-CCNC: 40 IU/L (ref 15–37)
BACTERIA: ABNORMAL /HPF
BASOPHILS ABSOLUTE: 0.1 K/CU MM
BASOPHILS RELATIVE PERCENT: 0.8 % (ref 0–1)
BILIRUB SERPL-MCNC: 0.9 MG/DL (ref 0–1)
BILIRUBIN, URINE: ABNORMAL MG/DL
BLOOD, URINE: NEGATIVE
BUN SERPL-MCNC: 14 MG/DL (ref 6–23)
CALCIUM SERPL-MCNC: 8.5 MG/DL (ref 8.3–10.6)
CAST TYPE: ABNORMAL /HPF
CHLORIDE BLD-SCNC: 106 MMOL/L (ref 99–110)
CLARITY: CLEAR
CO2: 22 MMOL/L (ref 21–32)
COLOR: YELLOW
CREAT SERPL-MCNC: 0.7 MG/DL (ref 0.6–1.1)
CRYSTAL TYPE: NEGATIVE /HPF
DIFFERENTIAL TYPE: ABNORMAL
EKG ATRIAL RATE: 129 BPM
EKG DIAGNOSIS: NORMAL
EKG Q-T INTERVAL: 312 MS
EKG QRS DURATION: 106 MS
EKG QTC CALCULATION (BAZETT): 481 MS
EKG R AXIS: -60 DEGREES
EKG T AXIS: 152 DEGREES
EKG VENTRICULAR RATE: 143 BPM
EOSINOPHILS ABSOLUTE: 0.1 K/CU MM
EOSINOPHILS RELATIVE PERCENT: 1.2 % (ref 0–3)
EPITHELIAL CELLS, UA: 1 /HPF
GFR, ESTIMATED: 81 ML/MIN/1.73M2
GLUCOSE SERPL-MCNC: 108 MG/DL (ref 70–99)
GLUCOSE URINE: NEGATIVE MG/DL
HCT VFR BLD CALC: 41 % (ref 37–47)
HEMOGLOBIN: 12.6 GM/DL (ref 12.5–16)
IMMATURE NEUTROPHIL %: 0.7 % (ref 0–0.43)
KETONES, URINE: 15 MG/DL
LACTIC ACID, SEPSIS: 2 MMOL/L (ref 0.4–2)
LACTIC ACID, SEPSIS: 2.2 MMOL/L (ref 0.4–2)
LEUKOCYTE ESTERASE, URINE: NEGATIVE
LYMPHOCYTES ABSOLUTE: 1.7 K/CU MM
LYMPHOCYTES RELATIVE PERCENT: 22.8 % (ref 24–44)
MCH RBC QN AUTO: 31.7 PG (ref 27–31)
MCHC RBC AUTO-ENTMCNC: 30.7 % (ref 32–36)
MCV RBC AUTO: 103 FL (ref 78–100)
MONOCYTES ABSOLUTE: 0.8 K/CU MM
MONOCYTES RELATIVE PERCENT: 10.4 % (ref 0–4)
NEUTROPHILS ABSOLUTE: 4.7 K/CU MM
NEUTROPHILS RELATIVE PERCENT: 64.1 % (ref 36–66)
NITRITE URINE, QUANTITATIVE: NEGATIVE
PDW BLD-RTO: 17.2 % (ref 11.7–14.9)
PH, URINE: 8 (ref 5–8)
PLATELET # BLD: 382 K/CU MM (ref 140–440)
PMV BLD AUTO: 9.7 FL (ref 7.5–11.1)
POTASSIUM SERPL-SCNC: 4.7 MMOL/L (ref 3.5–5.1)
PRO-BNP: 2567 PG/ML
PROTEIN UA: 100 MG/DL
RBC # BLD: 3.98 M/CU MM (ref 4.2–5.4)
RBC URINE: NEGATIVE /HPF (ref 0–6)
SODIUM BLD-SCNC: 141 MMOL/L (ref 135–145)
SPECIFIC GRAVITY UA: 1.02 (ref 1–1.03)
TOTAL IMMATURE NEUTOROPHIL: 0.05 K/CU MM
TOTAL PROTEIN: 6.3 GM/DL (ref 6.4–8.2)
TROPONIN, HIGH SENSITIVITY: 20 NG/L (ref 0–14)
TROPONIN, HIGH SENSITIVITY: 21 NG/L (ref 0–14)
UROBILINOGEN, URINE: 0.2 MG/DL (ref 0.2–1)
WBC # BLD: 7.3 K/CU MM (ref 4–10.5)
WBC UA: NEGATIVE /HPF (ref 0–5)

## 2024-05-30 PROCEDURE — 93010 ELECTROCARDIOGRAM REPORT: CPT | Performed by: INTERNAL MEDICINE

## 2024-05-30 PROCEDURE — 1111F DSCHRG MED/CURRENT MED MERGE: CPT | Performed by: INTERNAL MEDICINE

## 2024-05-30 PROCEDURE — 81001 URINALYSIS AUTO W/SCOPE: CPT

## 2024-05-30 PROCEDURE — 83880 ASSAY OF NATRIURETIC PEPTIDE: CPT

## 2024-05-30 PROCEDURE — 71045 X-RAY EXAM CHEST 1 VIEW: CPT

## 2024-05-30 PROCEDURE — 99215 OFFICE O/P EST HI 40 MIN: CPT | Performed by: INTERNAL MEDICINE

## 2024-05-30 PROCEDURE — 99285 EMERGENCY DEPT VISIT HI MDM: CPT

## 2024-05-30 PROCEDURE — 83605 ASSAY OF LACTIC ACID: CPT

## 2024-05-30 PROCEDURE — 1090F PRES/ABSN URINE INCON ASSESS: CPT | Performed by: INTERNAL MEDICINE

## 2024-05-30 PROCEDURE — 2500000003 HC RX 250 WO HCPCS: Performed by: EMERGENCY MEDICINE

## 2024-05-30 PROCEDURE — 80053 COMPREHEN METABOLIC PANEL: CPT

## 2024-05-30 PROCEDURE — 84484 ASSAY OF TROPONIN QUANT: CPT

## 2024-05-30 PROCEDURE — 85025 COMPLETE CBC W/AUTO DIFF WBC: CPT

## 2024-05-30 PROCEDURE — 87040 BLOOD CULTURE FOR BACTERIA: CPT

## 2024-05-30 PROCEDURE — 93000 ELECTROCARDIOGRAM COMPLETE: CPT | Performed by: INTERNAL MEDICINE

## 2024-05-30 PROCEDURE — G8427 DOCREV CUR MEDS BY ELIG CLIN: HCPCS | Performed by: INTERNAL MEDICINE

## 2024-05-30 PROCEDURE — 93005 ELECTROCARDIOGRAM TRACING: CPT | Performed by: EMERGENCY MEDICINE

## 2024-05-30 PROCEDURE — 96366 THER/PROPH/DIAG IV INF ADDON: CPT

## 2024-05-30 PROCEDURE — 1123F ACP DISCUSS/DSCN MKR DOCD: CPT | Performed by: INTERNAL MEDICINE

## 2024-05-30 PROCEDURE — 96365 THER/PROPH/DIAG IV INF INIT: CPT

## 2024-05-30 PROCEDURE — G8420 CALC BMI NORM PARAMETERS: HCPCS | Performed by: INTERNAL MEDICINE

## 2024-05-30 PROCEDURE — 1036F TOBACCO NON-USER: CPT | Performed by: INTERNAL MEDICINE

## 2024-05-30 RX ORDER — DILTIAZEM HYDROCHLORIDE 5 MG/ML
10 INJECTION INTRAVENOUS ONCE
Status: COMPLETED | OUTPATIENT
Start: 2024-05-30 | End: 2024-05-30

## 2024-05-30 RX ORDER — HYDROXYZINE HYDROCHLORIDE 10 MG/1
10 TABLET, FILM COATED ORAL 3 TIMES DAILY PRN
Status: ON HOLD | COMMUNITY

## 2024-05-30 RX ORDER — ASPIRIN 81 MG/1
81 TABLET, CHEWABLE ORAL DAILY
Qty: 30 TABLET | Refills: 3 | Status: ON HOLD | OUTPATIENT
Start: 2024-05-30

## 2024-05-30 RX ADMIN — DEXTROSE MONOHYDRATE 5 MG/HR: 50 INJECTION, SOLUTION INTRAVENOUS at 15:04

## 2024-05-30 RX ADMIN — DILTIAZEM HYDROCHLORIDE 10 MG: 5 INJECTION, SOLUTION INTRAVENOUS at 15:02

## 2024-05-30 ASSESSMENT — PAIN - FUNCTIONAL ASSESSMENT: PAIN_FUNCTIONAL_ASSESSMENT: 0-10

## 2024-05-30 ASSESSMENT — PAIN SCALES - GENERAL: PAINLEVEL_OUTOF10: 0

## 2024-05-30 NOTE — PROGRESS NOTES
[id number] Score for Atrial Fibrillation Stroke Risk   Risk   Factors  Component Value   C CHF Yes 1   H HTN Yes 1   A2 Age >= 75 Yes,  (89 y.o.) 2   D DM No 0   S2 Prior Stroke/TIA No 0   V Vascular Disease No 0   A Age 65-74 No,  (89 y.o.) 0   Sc Sex female 1    [id number]  Score  5   Score last updated 5/30/24 1:44 PM EDT    Click here for a link to the UpToDate guideline \"Atrial Fibrillation: Anticoagulation therapy to prevent embolization    Disclaimer: Risk Score calculation is dependent on accuracy of patient problem list and past encounter diagnosis.

## 2024-05-30 NOTE — PROGRESS NOTES
Cardio sent to ed      Af with rvr      Admit 2 wk ago with sepsis  Home iv abx      No look septic    La 2.4 then 2.0    Wbc nl    Ua ok      ?abscess near thr      Thr alexander early may    Cdz gtoo 140's 150's  now 7.5      Smmall pt        [facility]  Patient:  Breezy Hernández  2024 5:46 PM  [phone]  From: Yulia Rahman  [facility]  RE: Breezy Hernández   : 1931 . DX : afib with RVR . Dr. Vigil is requesting transfer from [place] ED for higher level

## 2024-05-30 NOTE — PROGRESS NOTES
Adam Andrews MD                                  CARDIOLOGY  NOTE         Chief Complaint:    Chief Complaint   Patient presents with    Atrial Fibrillation    Follow-Up from Hospital     Pt was in hospital for HIP replacement and was found to be in AFIB  with RVR  Caretaker stated her breathing seems harder but she hasn't complained to chest pain swelling or palpitations  Appetite has decreased        Patient recently admitted to the hospital for mechanical fall/hip fracture/ORIF  Presents today for follow-up  Complains of shortness of breath, poor appetite.        Prior history:    Breezy is a 89 y.o. year old female who was recently evaluated in the hospital January 2023.    Patient was noted to have new onset atrial fibrillation on admission.  Cardiology   consulted to evaluate patient.     Patient is a 89-year-old female who lives with her niece.  She has been recently diagnosed with a ovarian cyst, and has deferred further investigation in light of advanced age.  Patient also has history of mild dementia and is otherwise fairly active.  For the past several days patient has been feeling generalized weakness with tachycardia and coughing.  Upon arrival to the hospital she was noted to have new onset atrial fibrillation with rapid ventricular response.        Patient was treated conservatively at the hospital with objective of rate control strategy and stroke prevention action      Current Outpatient Medications   Medication Sig Dispense Refill    hydrOXYzine HCl (ATARAX) 10 MG tablet Take 1 tablet by mouth 3 times daily as needed for Itching      clopidogrel (PLAVIX) 75 MG tablet Take 1 tablet by mouth daily 30 tablet 3    folic acid (FOLVITE) 1 MG tablet Take 1 tablet by mouth daily 90 tablet 1    furosemide (LASIX) 20 MG tablet Take 1 tablet by mouth every other day 45 tablet 0    metoprolol tartrate (LOPRESSOR) 25 MG tablet TAKE 1/2 TABLET BY MOUTH TWICE A DAY 90 tablet 3    levothyroxine (SYNTHROID) 75

## 2024-05-30 NOTE — ED PROVIDER NOTES
Emergency Department Encounter  Location: [facility] EMERGENCY DEPARTMENT    Patient: Breezy Hernández  MRN: [id number]  : 3/26/1931  Date of evaluation: 2024  ED Provider: Johnathan Vigil DO, FACEP    Chief Complaint:    Tachycardia (Afib 's at doctor office, no complaints upon arrival )    [place]:  Breezy Hernández is a 89 y.o. female that presents to the emergency department from the cardiologist office with A-fib with RVR.  The patient was recently admitted in [place] on the .  At that time it was determined that she had sepsis most likely from urinary source.  There was some question she may have an abscess along her left hip from a recent hip surgery however it appears that she was septic from a urine source.  She was started on antibiotics.  She is on Eliquis and has been on Cardizem.  When she saw the cardiologist today it was noted that she was in A-fib with RVR so she was sent to the emergency department.  The patient states she feels \"pretty good today\".      ROS - see HPI, below listed is current ROS at time of my eval:  At least 10 systems reviewed and otherwise acutely negative except as in the [place].  General:  No fevers, no chills, no weakness  Eyes:  No recent vison changes, no discharge  ENT:  No sore throat, no nasal congestion, no hearing changes  Cardiovascular:  No chest pain, no palpitations  Respiratory:  No shortness of breath, no cough, no wheezing  Gastrointestinal:  No pain, no nausea, no vomiting, no diarrhea  Musculoskeletal:  No muscle pain, no joint pain  Skin:  No rash, no pruritis, no easy bruising  Neurologic:  No speech problems, no headache, no extremity numbness, no extremity tingling, no extremity weakness  Psychiatric:  No anxiety  Genitourinary:  No dysuria, no hematuria  Endocrine:  No unexpected weight gain, no unexpected weight loss  Extremities:  no edema, no pain    Past Medical History:   Diagnosis Date    A-fib (formerly [facility])     Arthritis     Thyroid  mood.     Labs:  Results for orders placed or performed during the hospital encounter of 05/30/24   CBC with Auto Differential   Result Value Ref Range    WBC 7.3 4.0 - 10.5 K/CU MM    RBC 3.98 (L) 4.2 - 5.4 M/CU MM    Hemoglobin 12.6 12.5 - 16.0 GM/DL    Hematocrit 41.0 37 - 47 %    .0 (H) 78 - 100 FL    MCH 31.7 (H) 27 - 31 PG    MCHC 30.7 (L) 32.0 - 36.0 %    RDW 17.2 (H) 11.7 - 14.9 %    Platelets 382 140 - 440 K/CU MM    MPV 9.7 7.5 - 11.1 FL    Differential Type AUTOMATED DIFFERENTIAL     Neutrophils % 64.1 36 - 66 %    Lymphocytes % 22.8 (L) 24 - 44 %    Monocytes % 10.4 (H) 0 - 4 %    Eosinophils % 1.2 0 - 3 %    Basophils % 0.8 0 - 1 %    Neutrophils Absolute 4.7 K/CU MM    Lymphocytes Absolute 1.7 K/CU MM    Monocytes Absolute 0.8 K/CU MM    Eosinophils Absolute 0.1 K/CU MM    Basophils Absolute 0.1 K/CU MM    Immature Neutrophil % 0.7 (H) 0 - 0.43 %    Total Immature Neutrophil 0.05 K/CU MM   Comprehensive Metabolic Panel   Result Value Ref Range    Sodium 141 135 - 145 MMOL/L    Potassium 4.7 3.5 - 5.1 MMOL/L    Chloride 106 99 - 110 mMol/L    CO2 22 21 - 32 MMOL/L    Anion Gap 13 7 - 16    Glucose 108 (H) 70 - 99 MG/DL    BUN 14 6 - 23 MG/DL    Creatinine 0.7 0.6 - 1.1 MG/DL    Est, Glom Filt Rate 81 >60 mL/min/1.73m2    Calcium 8.5 8.3 - 10.6 MG/DL    Total Protein 6.3 (L) 6.4 - 8.2 GM/DL    Albumin 3.1 (L) 3.4 - 5.0 GM/DL    Total Bilirubin 0.9 0.0 - 1.0 MG/DL    Alkaline Phosphatase 304 (H) 40 - 129 IU/L    ALT 27 10 - 40 U/L    AST 40 (H) 15 - 37 IU/L   Troponin   Result Value Ref Range    Troponin, High Sensitivity 21 (H) 0 - 14 ng/L   Troponin   Result Value Ref Range    Troponin, High Sensitivity 20 (H) 0 - 14 ng/L   Urinalysis with Reflex to Culture    Specimen: Urine   Result Value Ref Range    Color, UA YELLOW YELLOW    Clarity, UA CLEAR CLEAR    Glucose, Ur NEGATIVE NEGATIVE MG/DL    Bilirubin, Urine SMALL NUMBER OR AMOUNT OBSERVED (A) NEGATIVE MG/DL    Ketones, Urine 15 (A) NEGATIVE

## 2024-05-31 ENCOUNTER — HOSPITAL ENCOUNTER (INPATIENT)
Age: 89
LOS: 4 days | Discharge: HOME HEALTH | DRG: 309 | End: 2024-06-04
Attending: INTERNAL MEDICINE | Admitting: INTERNAL MEDICINE
Payer: MEDICARE

## 2024-05-31 VITALS
TEMPERATURE: 97.6 F | OXYGEN SATURATION: 97 % | SYSTOLIC BLOOD PRESSURE: 122 MMHG | RESPIRATION RATE: 16 BRPM | HEIGHT: 59 IN | HEART RATE: 96 BPM | DIASTOLIC BLOOD PRESSURE: 75 MMHG | BODY MASS INDEX: 19.56 KG/M2 | WEIGHT: 97 LBS

## 2024-05-31 PROBLEM — I48.91 ATRIAL FIBRILLATION WITH RVR (HCC): Status: ACTIVE | Noted: 2024-05-31

## 2024-05-31 LAB
ANION GAP SERPL CALCULATED.3IONS-SCNC: 13 MMOL/L (ref 7–16)
BASOPHILS ABSOLUTE: 0.1 K/CU MM
BASOPHILS RELATIVE PERCENT: 1.1 % (ref 0–1)
BUN SERPL-MCNC: 14 MG/DL (ref 6–23)
CALCIUM SERPL-MCNC: 8.1 MG/DL (ref 8.3–10.6)
CHLORIDE BLD-SCNC: 109 MMOL/L (ref 99–110)
CO2: 20 MMOL/L (ref 21–32)
CREAT SERPL-MCNC: 0.8 MG/DL (ref 0.6–1.1)
DIFFERENTIAL TYPE: ABNORMAL
EOSINOPHILS ABSOLUTE: 0.4 K/CU MM
EOSINOPHILS RELATIVE PERCENT: 5.2 % (ref 0–3)
GFR, ESTIMATED: 69 ML/MIN/1.73M2
GLUCOSE SERPL-MCNC: 99 MG/DL (ref 70–99)
HCT VFR BLD CALC: 41.5 % (ref 37–47)
HEMOGLOBIN: 11.8 GM/DL (ref 12.5–16)
IMMATURE NEUTROPHIL %: 0.3 % (ref 0–0.43)
LYMPHOCYTES ABSOLUTE: 2.2 K/CU MM
LYMPHOCYTES RELATIVE PERCENT: 30.7 % (ref 24–44)
MAGNESIUM: 2 MG/DL (ref 1.8–2.4)
MCH RBC QN AUTO: 32 PG (ref 27–31)
MCHC RBC AUTO-ENTMCNC: 28.4 % (ref 32–36)
MCV RBC AUTO: 112.5 FL (ref 78–100)
MONOCYTES ABSOLUTE: 0.8 K/CU MM
MONOCYTES RELATIVE PERCENT: 11.3 % (ref 0–4)
NEUTROPHILS ABSOLUTE: 3.7 K/CU MM
NEUTROPHILS RELATIVE PERCENT: 51.4 % (ref 36–66)
NUCLEATED RBC %: 0 %
PDW BLD-RTO: 17.2 % (ref 11.7–14.9)
PLATELET # BLD: 297 K/CU MM (ref 140–440)
PMV BLD AUTO: 9.6 FL (ref 7.5–11.1)
POTASSIUM SERPL-SCNC: 4.3 MMOL/L (ref 3.5–5.1)
RBC # BLD: 3.69 M/CU MM (ref 4.2–5.4)
SODIUM BLD-SCNC: 142 MMOL/L (ref 135–145)
TOTAL IMMATURE NEUTOROPHIL: 0.02 K/CU MM
TOTAL NUCLEATED RBC: 0 K/CU MM
TSH SERPL DL<=0.005 MIU/L-ACNC: 4.99 UIU/ML (ref 0.27–4.2)
WBC # BLD: 7.1 K/CU MM (ref 4–10.5)

## 2024-05-31 PROCEDURE — 6370000000 HC RX 637 (ALT 250 FOR IP): Performed by: INTERNAL MEDICINE

## 2024-05-31 PROCEDURE — 83735 ASSAY OF MAGNESIUM: CPT

## 2024-05-31 PROCEDURE — 6360000002 HC RX W HCPCS: Performed by: INTERNAL MEDICINE

## 2024-05-31 PROCEDURE — 94761 N-INVAS EAR/PLS OXIMETRY MLT: CPT

## 2024-05-31 PROCEDURE — 92610 EVALUATE SWALLOWING FUNCTION: CPT

## 2024-05-31 PROCEDURE — 2500000003 HC RX 250 WO HCPCS: Performed by: EMERGENCY MEDICINE

## 2024-05-31 PROCEDURE — 2140000000 HC CCU INTERMEDIATE R&B

## 2024-05-31 PROCEDURE — 2500000003 HC RX 250 WO HCPCS: Performed by: INTERNAL MEDICINE

## 2024-05-31 PROCEDURE — 99223 1ST HOSP IP/OBS HIGH 75: CPT | Performed by: INTERNAL MEDICINE

## 2024-05-31 PROCEDURE — 36415 COLL VENOUS BLD VENIPUNCTURE: CPT

## 2024-05-31 PROCEDURE — 85025 COMPLETE CBC W/AUTO DIFF WBC: CPT

## 2024-05-31 PROCEDURE — 84443 ASSAY THYROID STIM HORMONE: CPT

## 2024-05-31 PROCEDURE — 2580000003 HC RX 258: Performed by: INTERNAL MEDICINE

## 2024-05-31 PROCEDURE — 80048 BASIC METABOLIC PNL TOTAL CA: CPT

## 2024-05-31 RX ORDER — FOLIC ACID 1 MG/1
1 TABLET ORAL DAILY
Status: DISCONTINUED | OUTPATIENT
Start: 2024-05-31 | End: 2024-06-04 | Stop reason: HOSPADM

## 2024-05-31 RX ORDER — POLYETHYLENE GLYCOL 3350 17 G/17G
17 POWDER, FOR SOLUTION ORAL DAILY PRN
Status: DISCONTINUED | OUTPATIENT
Start: 2024-05-31 | End: 2024-06-04 | Stop reason: HOSPADM

## 2024-05-31 RX ORDER — ACETAMINOPHEN 650 MG/1
650 SUPPOSITORY RECTAL EVERY 6 HOURS PRN
Status: DISCONTINUED | OUTPATIENT
Start: 2024-05-31 | End: 2024-06-04 | Stop reason: HOSPADM

## 2024-05-31 RX ORDER — ENOXAPARIN SODIUM 100 MG/ML
30 INJECTION SUBCUTANEOUS DAILY
Status: DISCONTINUED | OUTPATIENT
Start: 2024-05-31 | End: 2024-06-04 | Stop reason: HOSPADM

## 2024-05-31 RX ORDER — METOPROLOL SUCCINATE 50 MG/1
50 TABLET, EXTENDED RELEASE ORAL 2 TIMES DAILY
Status: DISCONTINUED | OUTPATIENT
Start: 2024-05-31 | End: 2024-06-03

## 2024-05-31 RX ORDER — ASPIRIN 81 MG/1
81 TABLET, CHEWABLE ORAL DAILY
Status: DISCONTINUED | OUTPATIENT
Start: 2024-05-31 | End: 2024-06-04 | Stop reason: HOSPADM

## 2024-05-31 RX ORDER — ONDANSETRON 4 MG/1
4 TABLET, ORALLY DISINTEGRATING ORAL EVERY 8 HOURS PRN
Status: DISCONTINUED | OUTPATIENT
Start: 2024-05-31 | End: 2024-06-04 | Stop reason: HOSPADM

## 2024-05-31 RX ORDER — SODIUM CHLORIDE 9 MG/ML
INJECTION, SOLUTION INTRAVENOUS PRN
Status: DISCONTINUED | OUTPATIENT
Start: 2024-05-31 | End: 2024-06-04 | Stop reason: HOSPADM

## 2024-05-31 RX ORDER — ACETAMINOPHEN 325 MG/1
650 TABLET ORAL EVERY 6 HOURS PRN
Status: DISCONTINUED | OUTPATIENT
Start: 2024-05-31 | End: 2024-06-04 | Stop reason: HOSPADM

## 2024-05-31 RX ORDER — FUROSEMIDE 10 MG/ML
40 INJECTION INTRAMUSCULAR; INTRAVENOUS ONCE
Status: COMPLETED | OUTPATIENT
Start: 2024-05-31 | End: 2024-05-31

## 2024-05-31 RX ORDER — LEVOTHYROXINE SODIUM 0.07 MG/1
75 TABLET ORAL DAILY
Status: DISCONTINUED | OUTPATIENT
Start: 2024-05-31 | End: 2024-06-04 | Stop reason: HOSPADM

## 2024-05-31 RX ORDER — FUROSEMIDE 20 MG/1
20 TABLET ORAL EVERY OTHER DAY
Status: DISCONTINUED | OUTPATIENT
Start: 2024-05-31 | End: 2024-06-04 | Stop reason: HOSPADM

## 2024-05-31 RX ORDER — LANOLIN ALCOHOL/MO/W.PET/CERES
3 CREAM (GRAM) TOPICAL NIGHTLY
Status: DISCONTINUED | OUTPATIENT
Start: 2024-05-31 | End: 2024-06-04 | Stop reason: HOSPADM

## 2024-05-31 RX ORDER — CLOPIDOGREL BISULFATE 75 MG/1
75 TABLET ORAL DAILY
Status: DISCONTINUED | OUTPATIENT
Start: 2024-05-31 | End: 2024-06-04 | Stop reason: HOSPADM

## 2024-05-31 RX ORDER — SODIUM CHLORIDE 0.9 % (FLUSH) 0.9 %
5-40 SYRINGE (ML) INJECTION PRN
Status: DISCONTINUED | OUTPATIENT
Start: 2024-05-31 | End: 2024-06-04 | Stop reason: HOSPADM

## 2024-05-31 RX ORDER — SODIUM CHLORIDE 0.9 % (FLUSH) 0.9 %
5-40 SYRINGE (ML) INJECTION EVERY 12 HOURS SCHEDULED
Status: DISCONTINUED | OUTPATIENT
Start: 2024-05-31 | End: 2024-06-04 | Stop reason: HOSPADM

## 2024-05-31 RX ORDER — ONDANSETRON 2 MG/ML
4 INJECTION INTRAMUSCULAR; INTRAVENOUS EVERY 6 HOURS PRN
Status: DISCONTINUED | OUTPATIENT
Start: 2024-05-31 | End: 2024-06-04 | Stop reason: HOSPADM

## 2024-05-31 RX ADMIN — FUROSEMIDE 20 MG: 20 TABLET ORAL at 08:21

## 2024-05-31 RX ADMIN — SODIUM CHLORIDE 2.5 MG/HR: 900 INJECTION, SOLUTION INTRAVENOUS at 06:51

## 2024-05-31 RX ADMIN — CLOPIDOGREL BISULFATE 75 MG: 75 TABLET ORAL at 08:21

## 2024-05-31 RX ADMIN — DEXTROSE MONOHYDRATE 5 MG/HR: 50 INJECTION, SOLUTION INTRAVENOUS at 02:34

## 2024-05-31 RX ADMIN — ENOXAPARIN SODIUM 30 MG: 100 INJECTION SUBCUTANEOUS at 08:20

## 2024-05-31 RX ADMIN — FUROSEMIDE 40 MG: 10 INJECTION, SOLUTION INTRAMUSCULAR; INTRAVENOUS at 08:22

## 2024-05-31 RX ADMIN — FOLIC ACID 1 MG: 1 TABLET ORAL at 08:21

## 2024-05-31 RX ADMIN — METOPROLOL SUCCINATE 50 MG: 50 TABLET, EXTENDED RELEASE ORAL at 20:24

## 2024-05-31 RX ADMIN — SODIUM CHLORIDE 7.5 MG/HR: 900 INJECTION, SOLUTION INTRAVENOUS at 16:45

## 2024-05-31 RX ADMIN — ASPIRIN 81 MG: 81 TABLET, CHEWABLE ORAL at 08:21

## 2024-05-31 RX ADMIN — METOPROLOL SUCCINATE 50 MG: 50 TABLET, EXTENDED RELEASE ORAL at 08:21

## 2024-05-31 RX ADMIN — SODIUM CHLORIDE, PRESERVATIVE FREE 10 ML: 5 INJECTION INTRAVENOUS at 08:23

## 2024-05-31 RX ADMIN — Medication 3 MG: at 20:24

## 2024-05-31 ASSESSMENT — PAIN SCALES - WONG BAKER
WONGBAKER_NUMERICALRESPONSE: NO HURT

## 2024-05-31 ASSESSMENT — PAIN SCALES - GENERAL
PAINLEVEL_OUTOF10: 0

## 2024-05-31 NOTE — CONSULTS
CARDIOLOGY CONSULT NOTE   Reason for consultation:  afib    Referring physician:  Marina Perez MD     Primary care physician: Sharmila Driscoll, APRN - NP      Dear  Dr. Marina Perez MD   Thanks for the consult.    Chief Complaints :No chief complaint on file.       History of present illness:Breezy is a 89 y.o.year old who presents with complaints of palpitations she was noted to have rapid heart rate she has been fatigued and tired she was evaluated by Dr. Andrews in the office she has known permanent atrial fibrillation which she was being controlled on management and rate control strategy however she did not take her Cardizem heart rate is up to 160s she was sent from office.  There is also concern for possible urinary tract infection  Overnight started on Cardizem drip which has brought her heart rate down she is on metoprolol 25 mg twice a day kidney functions are normal echo shows EF of 40% she does not appear to be volume overloaded she is DNR no cardioversion attempt was made she is noted to be in A-fib since 2022 never attempted to be cardioverted TSH is normal  Moderate aortic regurgitation with EF of 40%    Past medical history:    has a past medical history of A-fib (HCC), Arthritis, and Thyroid disease.  Past surgical history:   has a past surgical history that includes Tonsillectomy; Appendectomy; Cholecystectomy; Hysterectomy; eye surgery; fracture surgery; and hip surgery (Left, 5/5/2024).  Social History:   reports that she has never smoked. She has never used smokeless tobacco. She reports that she does not drink alcohol and does not use drugs.  Family history:   no family history of CAD, STROKE of DM at early age    No Known Allergies    sodium chloride flush 0.9 % injection 5-40 mL, 2 times per day  sodium chloride flush 0.9 % injection 5-40 mL, PRN  0.9 % sodium chloride infusion, PRN  ondansetron (ZOFRAN-ODT) disintegrating tablet 4 mg, Q8H PRN   Or  ondansetron (ZOFRAN)

## 2024-05-31 NOTE — H&P
History and Physical      Name:  Breezy Hernández /Age/Sex: 3/26/1931  (89 y.o. female)   MRN & CSN:  [id number] & [id number] Encounter Date/Time: 2024 4:30 AM EDT   Location:  [facility] PCP: Sharmila Driscoll APRN - NP         Assessment and Plan:     #.  A-fib with RVR  - on arrival  -Patient received Cardizem 10 mg push, started on Cardizem drip.  -Continue Cardizem drip  -Consult cardiology  -Keep n.p.o. after midnight  -Patient not on anticoagulation due to fall risk.  -Continue aspirin, Plavix, metoprolol.  -Wean off Cardizem as tolerated    #.  Recent admission -2024  -Sepsis-suspected secondary to UTI A-fib with RVR  -Patient was evaluated by ID and there was no concern for surgical site infection   -patient was discharged on 10-day course of antibiotics (end date 2024)    #.  Admission - 2024 for left femoral neck fracture  -S/p IM nail abe insertion-2024  -Patient also had A-fib with RVR    #.  Persistent atrial fibrillation (onset 2023)  -Patient is on metoprolol 12.5 mg twice daily, aspirin, Plavix    #. Chronic combined systolic and diastolic CHF  -Echo-2023-EF 45-50%, grade 2 diastolic dysfunction, global hypokinesis, moderate aortic regurgitation  -proBNP 2567  -Patient is on Lasix 20 mg every other day    #.  Chronic anemia  -Hemoglobin 12.6 today  -Monitor with repeat H&H    #.  Hypothyroidism-continue levothyroxine    #.  Right ovarian cystic neoplasm  -Patient declined further workup as per documentation    #.  Cognitive impairment  -Delirium precautions    Disposition:   Current Living situation: Home    Diet Cardiac   DVT Prophylaxis [x] Lovenox   Code Status DNR CCA as per previous documentation.  Needs to be readdressed in a.m.   Surrogate Decision Maker/ POA Niece     History from:   EMR.    History of Present Illness:     Chief Complaint: <principal problem not specified>  Breezy Hernández is a 89 y.o. female with persistent atrial

## 2024-05-31 NOTE — CONSULTS
[facility] Nurse  Consult Note       Breezy Hernández  AGE: 89 y.o.   GENDER: female  : 3/26/1931  TODAY'S DATE:  2024    Subjective:     Reason for  Evaluation and Assessment: wound care eval.      Breezy Hernández is a 89 y.o. female referred by:   [x] Physician  [] Nursing  [] Other:     Wound Identification:  Wound Type: pressure and non-healing surgical  Contributing Factors: chronic pressure, decreased mobility, malnutrition, and incontinence of urine        PAST MEDICAL HISTORY        Diagnosis Date    A-fib (HCC)     Arthritis     Thyroid disease        PAST SURGICAL HISTORY    Past Surgical History:   Procedure Laterality Date    APPENDECTOMY      CHOLECYSTECTOMY      EYE SURGERY      FRACTURE SURGERY      HIP SURGERY Left 2024    HIP INTRAMEDULLARY NAIL BK INSERTION performed by Francisco Montilla DO at [facility] OR    HYSTERECTOMY (CERVIX STATUS UNKNOWN)      TONSILLECTOMY         FAMILY HISTORY    No family history on file.    SOCIAL HISTORY    Social History     Tobacco Use    Smoking status: Never    Smokeless tobacco: Never   Vaping Use    Vaping Use: Never used   Substance Use Topics    Alcohol use: No    Drug use: No       ALLERGIES    No Known Allergies    MEDICATIONS    No current facility-administered medications on file prior to encounter.     Current Outpatient Medications on File Prior to Encounter   Medication Sig Dispense Refill    hydrOXYzine HCl (ATARAX) 10 MG tablet Take 1 tablet by mouth 3 times daily as needed for Itching      aspirin 81 MG chewable tablet Take 1 tablet by mouth daily 30 tablet 3    clopidogrel (PLAVIX) 75 MG tablet Take 1 tablet by mouth daily 30 tablet 3    folic acid (FOLVITE) 1 MG tablet Take 1 tablet by mouth daily 90 tablet 1    furosemide (LASIX) 20 MG tablet Take 1 tablet by mouth every other day 45 tablet 0    metoprolol tartrate (LOPRESSOR) 25 MG tablet TAKE 1/2 TABLET BY MOUTH TWICE A DAY 90 tablet 3    levothyroxine (SYNTHROID) 75

## 2024-06-01 LAB
ANION GAP SERPL CALCULATED.3IONS-SCNC: 13 MMOL/L (ref 7–16)
BASOPHILS ABSOLUTE: 0.1 K/CU MM
BASOPHILS RELATIVE PERCENT: 0.7 % (ref 0–1)
BUN SERPL-MCNC: 13 MG/DL (ref 6–23)
CALCIUM SERPL-MCNC: 7.9 MG/DL (ref 8.3–10.6)
CHLORIDE BLD-SCNC: 107 MMOL/L (ref 99–110)
CO2: 22 MMOL/L (ref 21–32)
CREAT SERPL-MCNC: 0.8 MG/DL (ref 0.6–1.1)
DIFFERENTIAL TYPE: ABNORMAL
EOSINOPHILS ABSOLUTE: 0.5 K/CU MM
EOSINOPHILS RELATIVE PERCENT: 6.4 % (ref 0–3)
GFR, ESTIMATED: 69 ML/MIN/1.73M2
GLUCOSE SERPL-MCNC: 102 MG/DL (ref 70–99)
HCT VFR BLD CALC: 40.5 % (ref 37–47)
HEMOGLOBIN: 12.1 GM/DL (ref 12.5–16)
IMMATURE NEUTROPHIL %: 0.1 % (ref 0–0.43)
LYMPHOCYTES ABSOLUTE: 2.3 K/CU MM
LYMPHOCYTES RELATIVE PERCENT: 32.2 % (ref 24–44)
MAGNESIUM: 1.9 MG/DL (ref 1.8–2.4)
MCH RBC QN AUTO: 31.5 PG (ref 27–31)
MCHC RBC AUTO-ENTMCNC: 29.9 % (ref 32–36)
MCV RBC AUTO: 105.5 FL (ref 78–100)
MONOCYTES ABSOLUTE: 0.8 K/CU MM
MONOCYTES RELATIVE PERCENT: 11 % (ref 0–4)
NEUTROPHILS ABSOLUTE: 3.5 K/CU MM
NEUTROPHILS RELATIVE PERCENT: 49.6 % (ref 36–66)
NUCLEATED RBC %: 0 %
PDW BLD-RTO: 16.5 % (ref 11.7–14.9)
PLATELET # BLD: 300 K/CU MM (ref 140–440)
PMV BLD AUTO: 9.5 FL (ref 7.5–11.1)
POTASSIUM SERPL-SCNC: 3.8 MMOL/L (ref 3.5–5.1)
RBC # BLD: 3.84 M/CU MM (ref 4.2–5.4)
SODIUM BLD-SCNC: 142 MMOL/L (ref 135–145)
TOTAL IMMATURE NEUTOROPHIL: 0.01 K/CU MM
TOTAL NUCLEATED RBC: 0 K/CU MM
WBC # BLD: 7.1 K/CU MM (ref 4–10.5)

## 2024-06-01 PROCEDURE — 2580000003 HC RX 258: Performed by: INTERNAL MEDICINE

## 2024-06-01 PROCEDURE — 6360000002 HC RX W HCPCS: Performed by: INTERNAL MEDICINE

## 2024-06-01 PROCEDURE — 94761 N-INVAS EAR/PLS OXIMETRY MLT: CPT

## 2024-06-01 PROCEDURE — 83735 ASSAY OF MAGNESIUM: CPT

## 2024-06-01 PROCEDURE — 6370000000 HC RX 637 (ALT 250 FOR IP): Performed by: INTERNAL MEDICINE

## 2024-06-01 PROCEDURE — 36415 COLL VENOUS BLD VENIPUNCTURE: CPT

## 2024-06-01 PROCEDURE — 2500000003 HC RX 250 WO HCPCS: Performed by: INTERNAL MEDICINE

## 2024-06-01 PROCEDURE — 80048 BASIC METABOLIC PNL TOTAL CA: CPT

## 2024-06-01 PROCEDURE — 99232 SBSQ HOSP IP/OBS MODERATE 35: CPT | Performed by: INTERNAL MEDICINE

## 2024-06-01 PROCEDURE — 2140000000 HC CCU INTERMEDIATE R&B

## 2024-06-01 PROCEDURE — 85025 COMPLETE CBC W/AUTO DIFF WBC: CPT

## 2024-06-01 RX ADMIN — ASPIRIN 81 MG: 81 TABLET, CHEWABLE ORAL at 08:06

## 2024-06-01 RX ADMIN — METOPROLOL SUCCINATE 50 MG: 50 TABLET, EXTENDED RELEASE ORAL at 21:35

## 2024-06-01 RX ADMIN — FOLIC ACID 1 MG: 1 TABLET ORAL at 08:06

## 2024-06-01 RX ADMIN — Medication 3 MG: at 21:35

## 2024-06-01 RX ADMIN — METOPROLOL SUCCINATE 50 MG: 50 TABLET, EXTENDED RELEASE ORAL at 08:06

## 2024-06-01 RX ADMIN — SODIUM CHLORIDE 5 MG/HR: 900 INJECTION, SOLUTION INTRAVENOUS at 17:23

## 2024-06-01 RX ADMIN — LEVOTHYROXINE SODIUM 75 MCG: 0.07 TABLET ORAL at 06:01

## 2024-06-01 RX ADMIN — CLOPIDOGREL BISULFATE 75 MG: 75 TABLET ORAL at 08:06

## 2024-06-01 RX ADMIN — SODIUM CHLORIDE, PRESERVATIVE FREE 10 ML: 5 INJECTION INTRAVENOUS at 08:06

## 2024-06-01 RX ADMIN — ENOXAPARIN SODIUM 30 MG: 100 INJECTION SUBCUTANEOUS at 08:06

## 2024-06-01 ASSESSMENT — PAIN SCALES - WONG BAKER
WONGBAKER_NUMERICALRESPONSE: NO HURT

## 2024-06-01 ASSESSMENT — PAIN SCALES - GENERAL
PAINLEVEL_OUTOF10: 0
PAINLEVEL_OUTOF10: 0

## 2024-06-02 LAB
CULTURE: NORMAL
CULTURE: NORMAL
GLUCOSE BLD-MCNC: 131 MG/DL (ref 70–99)
Lab: NORMAL
Lab: NORMAL
MAGNESIUM: 2 MG/DL (ref 1.8–2.4)
SPECIMEN: NORMAL
SPECIMEN: NORMAL

## 2024-06-02 PROCEDURE — 6370000000 HC RX 637 (ALT 250 FOR IP): Performed by: INTERNAL MEDICINE

## 2024-06-02 PROCEDURE — 6360000002 HC RX W HCPCS: Performed by: INTERNAL MEDICINE

## 2024-06-02 PROCEDURE — 2580000003 HC RX 258: Performed by: INTERNAL MEDICINE

## 2024-06-02 PROCEDURE — 99232 SBSQ HOSP IP/OBS MODERATE 35: CPT | Performed by: INTERNAL MEDICINE

## 2024-06-02 PROCEDURE — 82962 GLUCOSE BLOOD TEST: CPT

## 2024-06-02 PROCEDURE — 36415 COLL VENOUS BLD VENIPUNCTURE: CPT

## 2024-06-02 PROCEDURE — 2500000003 HC RX 250 WO HCPCS: Performed by: INTERNAL MEDICINE

## 2024-06-02 PROCEDURE — 2140000000 HC CCU INTERMEDIATE R&B

## 2024-06-02 PROCEDURE — 94761 N-INVAS EAR/PLS OXIMETRY MLT: CPT

## 2024-06-02 PROCEDURE — 83735 ASSAY OF MAGNESIUM: CPT

## 2024-06-02 RX ADMIN — SODIUM CHLORIDE, PRESERVATIVE FREE 10 ML: 5 INJECTION INTRAVENOUS at 22:07

## 2024-06-02 RX ADMIN — CLOPIDOGREL BISULFATE 75 MG: 75 TABLET ORAL at 10:03

## 2024-06-02 RX ADMIN — SODIUM CHLORIDE, PRESERVATIVE FREE 10 ML: 5 INJECTION INTRAVENOUS at 10:04

## 2024-06-02 RX ADMIN — Medication 3 MG: at 22:08

## 2024-06-02 RX ADMIN — FUROSEMIDE 20 MG: 20 TABLET ORAL at 10:03

## 2024-06-02 RX ADMIN — ASPIRIN 81 MG: 81 TABLET, CHEWABLE ORAL at 10:03

## 2024-06-02 RX ADMIN — FOLIC ACID 1 MG: 1 TABLET ORAL at 10:03

## 2024-06-02 RX ADMIN — ENOXAPARIN SODIUM 30 MG: 100 INJECTION SUBCUTANEOUS at 10:03

## 2024-06-02 RX ADMIN — METOPROLOL SUCCINATE 50 MG: 50 TABLET, EXTENDED RELEASE ORAL at 22:08

## 2024-06-02 RX ADMIN — LEVOTHYROXINE SODIUM 75 MCG: 0.07 TABLET ORAL at 05:01

## 2024-06-02 RX ADMIN — SODIUM CHLORIDE 5 MG/HR: 900 INJECTION, SOLUTION INTRAVENOUS at 18:26

## 2024-06-02 RX ADMIN — METOPROLOL SUCCINATE 50 MG: 50 TABLET, EXTENDED RELEASE ORAL at 10:03

## 2024-06-02 ASSESSMENT — PAIN SCALES - GENERAL: PAINLEVEL_OUTOF10: 0

## 2024-06-02 ASSESSMENT — PAIN SCALES - WONG BAKER
WONGBAKER_NUMERICALRESPONSE: NO HURT

## 2024-06-03 LAB — MAGNESIUM: 2.2 MG/DL (ref 1.8–2.4)

## 2024-06-03 PROCEDURE — 2140000000 HC CCU INTERMEDIATE R&B

## 2024-06-03 PROCEDURE — 99232 SBSQ HOSP IP/OBS MODERATE 35: CPT | Performed by: INTERNAL MEDICINE

## 2024-06-03 PROCEDURE — APPNB15 APP NON BILLABLE TIME 0-15 MINS

## 2024-06-03 PROCEDURE — 36415 COLL VENOUS BLD VENIPUNCTURE: CPT

## 2024-06-03 PROCEDURE — 6370000000 HC RX 637 (ALT 250 FOR IP)

## 2024-06-03 PROCEDURE — 6360000002 HC RX W HCPCS: Performed by: INTERNAL MEDICINE

## 2024-06-03 PROCEDURE — 6370000000 HC RX 637 (ALT 250 FOR IP): Performed by: INTERNAL MEDICINE

## 2024-06-03 PROCEDURE — 2580000003 HC RX 258: Performed by: INTERNAL MEDICINE

## 2024-06-03 PROCEDURE — 83735 ASSAY OF MAGNESIUM: CPT

## 2024-06-03 PROCEDURE — 94761 N-INVAS EAR/PLS OXIMETRY MLT: CPT

## 2024-06-03 RX ORDER — METOPROLOL TARTRATE 50 MG/1
50 TABLET, FILM COATED ORAL 2 TIMES DAILY
Status: DISCONTINUED | OUTPATIENT
Start: 2024-06-03 | End: 2024-06-04

## 2024-06-03 RX ADMIN — DILTIAZEM HYDROCHLORIDE 30 MG: 30 TABLET, FILM COATED ORAL at 01:21

## 2024-06-03 RX ADMIN — FOLIC ACID 1 MG: 1 TABLET ORAL at 11:22

## 2024-06-03 RX ADMIN — ENOXAPARIN SODIUM 30 MG: 100 INJECTION SUBCUTANEOUS at 11:22

## 2024-06-03 RX ADMIN — SODIUM CHLORIDE, PRESERVATIVE FREE 10 ML: 5 INJECTION INTRAVENOUS at 22:43

## 2024-06-03 RX ADMIN — Medication 3 MG: at 22:43

## 2024-06-03 RX ADMIN — METOPROLOL TARTRATE 50 MG: 50 TABLET, FILM COATED ORAL at 22:43

## 2024-06-03 RX ADMIN — DILTIAZEM HYDROCHLORIDE 30 MG: 30 TABLET, FILM COATED ORAL at 18:23

## 2024-06-03 RX ADMIN — DILTIAZEM HYDROCHLORIDE 30 MG: 30 TABLET, FILM COATED ORAL at 11:22

## 2024-06-03 RX ADMIN — SODIUM CHLORIDE, PRESERVATIVE FREE 10 ML: 5 INJECTION INTRAVENOUS at 11:23

## 2024-06-03 RX ADMIN — ASPIRIN 81 MG: 81 TABLET, CHEWABLE ORAL at 11:22

## 2024-06-03 RX ADMIN — CLOPIDOGREL BISULFATE 75 MG: 75 TABLET ORAL at 11:22

## 2024-06-03 RX ADMIN — LEVOTHYROXINE SODIUM 75 MCG: 0.07 TABLET ORAL at 06:21

## 2024-06-03 RX ADMIN — METOPROLOL TARTRATE 50 MG: 50 TABLET, FILM COATED ORAL at 11:22

## 2024-06-03 RX ADMIN — DILTIAZEM HYDROCHLORIDE 30 MG: 30 TABLET, FILM COATED ORAL at 22:43

## 2024-06-03 RX ADMIN — DILTIAZEM HYDROCHLORIDE 30 MG: 30 TABLET, FILM COATED ORAL at 06:21

## 2024-06-03 ASSESSMENT — PAIN SCALES - WONG BAKER
WONGBAKER_NUMERICALRESPONSE: NO HURT

## 2024-06-03 NOTE — CARE COORDINATION
CM call to Pt niece Vendal to follow up on discharge planning.  [facility]     PS to Dr. Vasquez to update, home  care order requested.     Pt will need transportation home at discharge.

## 2024-06-04 VITALS
SYSTOLIC BLOOD PRESSURE: 106 MMHG | RESPIRATION RATE: 10 BRPM | TEMPERATURE: 98 F | HEART RATE: 77 BPM | WEIGHT: 83.33 LBS | DIASTOLIC BLOOD PRESSURE: 72 MMHG | OXYGEN SATURATION: 98 % | BODY MASS INDEX: 16.8 KG/M2 | HEIGHT: 59 IN

## 2024-06-04 LAB
CULTURE: NORMAL
CULTURE: NORMAL
Lab: NORMAL
Lab: NORMAL
SPECIMEN: NORMAL
SPECIMEN: NORMAL

## 2024-06-04 PROCEDURE — 6370000000 HC RX 637 (ALT 250 FOR IP): Performed by: INTERNAL MEDICINE

## 2024-06-04 PROCEDURE — 6360000002 HC RX W HCPCS: Performed by: INTERNAL MEDICINE

## 2024-06-04 PROCEDURE — 2580000003 HC RX 258: Performed by: INTERNAL MEDICINE

## 2024-06-04 PROCEDURE — 6370000000 HC RX 637 (ALT 250 FOR IP)

## 2024-06-04 PROCEDURE — APPNB15 APP NON BILLABLE TIME 0-15 MINS

## 2024-06-04 RX ORDER — METOPROLOL TARTRATE 100 MG/1
100 TABLET ORAL 2 TIMES DAILY
Qty: 60 TABLET | Refills: 3 | Status: SHIPPED | OUTPATIENT
Start: 2024-06-04

## 2024-06-04 RX ORDER — METOPROLOL TARTRATE 50 MG/1
100 TABLET, FILM COATED ORAL 2 TIMES DAILY
Status: DISCONTINUED | OUTPATIENT
Start: 2024-06-04 | End: 2024-06-04 | Stop reason: HOSPADM

## 2024-06-04 RX ADMIN — CLOPIDOGREL BISULFATE 75 MG: 75 TABLET ORAL at 09:35

## 2024-06-04 RX ADMIN — ASPIRIN 81 MG: 81 TABLET, CHEWABLE ORAL at 09:34

## 2024-06-04 RX ADMIN — LEVOTHYROXINE SODIUM 75 MCG: 0.07 TABLET ORAL at 09:34

## 2024-06-04 RX ADMIN — FOLIC ACID 1 MG: 1 TABLET ORAL at 09:34

## 2024-06-04 RX ADMIN — SODIUM CHLORIDE, PRESERVATIVE FREE 10 ML: 5 INJECTION INTRAVENOUS at 09:34

## 2024-06-04 RX ADMIN — METOPROLOL TARTRATE 50 MG: 50 TABLET, FILM COATED ORAL at 09:35

## 2024-06-04 RX ADMIN — ENOXAPARIN SODIUM 30 MG: 100 INJECTION SUBCUTANEOUS at 09:34

## 2024-06-04 RX ADMIN — FUROSEMIDE 20 MG: 20 TABLET ORAL at 09:34

## 2024-06-04 RX ADMIN — DILTIAZEM HYDROCHLORIDE 30 MG: 30 TABLET, FILM COATED ORAL at 09:34

## 2024-06-04 NOTE — PROGRESS NOTES
V2.0  [facility] Hospitalist Progress Note      Name:  Breezy Hernández /Age/Sex: 3/26/1931  (89 y.o. female)   MRN & CSN:  [id number] & [id number] Encounter Date/Time: 2024 1:36 PM EDT    Location:  [facility]/3122-A PCP: Sharmila Driscoll APRN - NP       Hospital Day: 2    Assessment and Plan:   Breezy Hernández is a 89 y.o. female with pmh of Afib, CHF,  chrionic anemia, hypothyroidism who presents with Atrial fibrillation with RVR ([facility])      Plan:  #.  A-fib with RVR  - on arrival  -Patient received Cardizem 10 mg push, started on Cardizem drip.  -Continue Cardizem drip  -Cardiology on board  -Patient not on anticoagulation due to fall risk.  -Continue aspirin, Plavix, metoprolol.  -Wean off Cardizem as tolerated  -Awaiting cardiology recs, currently continue current management     #.  Recent admission -2024  -Sepsis-suspected secondary to UTI A-fib with RVR  -Patient was evaluated by ID and there was no concern for surgical site infection   -patient was discharged on 10-day course of antibiotics (end date 2024)     #.  Admission - 2024 for left femoral neck fracture  -S/p IM nail abe insertion-2024  -Patient also had A-fib with RVR     #.  Persistent atrial fibrillation (onset 2023)  -Patient is on metoprolol 12.5 mg twice daily, aspirin, Plavix     #. Chronic combined systolic and diastolic CHF  -Echo-2023-EF 45-50%, grade 2 diastolic dysfunction, global hypokinesis, moderate aortic regurgitation  -proBNP 2567  -Patient is on Lasix 20 mg every other day     #.  Chronic anemia  -Hemoglobin 12.6 today  -Monitor with repeat H&H     #.  Hypothyroidism-continue levothyroxine     #.  Right ovarian cystic neoplasm  -Patient declined further workup as per documentation     #.  Cognitive impairment  -Delirium precautions     Medical Decision Making:  The following items were considered in medical decision making:  Discussion of patient care with other providers  Reviewed clinical 
    V2.0  [facility] Hospitalist Progress Note      Name:  Breezy Hernández /Age/Sex: 3/26/1931  (89 y.o. female)   MRN & CSN:  [id number] & [id number] Encounter Date/Time: 6/3/2024 1:36 PM EDT    Location:  [facility]/3122-A PCP: Sharmila Driscoll APRN - NP       Hospital Day: 4    Assessment and Plan:   Breezy Hernández is a 89 y.o. female with pmh of Afib, CHF,  chrionic anemia, hypothyroidism who presents with Atrial fibrillation with RVR ([facility])    Plan:  #.  A-fib with RVR  - on arrival  -Patient received Cardizem 10 mg push, started on Cardizem drip.  -Continue Cardizem drip  -Cardiology on board  -Patient not on anticoagulation due to fall risk.  -Continue aspirin, Plavix, metoprolol.  -Wean off Cardizem as tolerated  -Cardiology on board appreciate recommendation     #.  Recent admission -2024  -Sepsis-suspected secondary to UTI A-fib with RVR  -Patient was evaluated by ID and there was no concern for surgical site infection   -patient was discharged on 10-day course of antibiotics (end date 2024)     #.  Admission - 2024 for left femoral neck fracture  -S/p IM nail abe insertion-2024  -Patient also had A-fib with RVR     #.  Persistent atrial fibrillation (onset 2023)  -Patient is on metoprolol 12.5 mg twice daily, aspirin, Plavix     #. Chronic combined systolic and diastolic CHF  -Echo-2023-EF 45-50%, grade 2 diastolic dysfunction, global hypokinesis, moderate aortic regurgitation  -proBNP 2567  -Patient is on Lasix 20 mg every other day     #.  Chronic anemia  -Hemoglobin 12.6 today  -Monitor with repeat H&H     #.  Hypothyroidism-continue levothyroxine     #.  Right ovarian cystic neoplasm  -Patient declined further workup as per documentation     #.  Cognitive impairment  -Delirium precautions     Medical Decision Making:  The following items were considered in medical decision making:  Discussion of patient care with other providers  Reviewed clinical lab tests if 
   Today's plan: DC IV cardizem drip, keep hr less than 100bpmM Continue cardizem oral 30 EVERY 6 HOURS     Admit Date:  5/31/2024    Subjective:ok      Chief complaints on admission: afib RVR          History of present illness:Breezy is a 89 y.o.year old who  presents with afib RVR    Past medical history:    has a past medical history of A-fib (HCC), Arthritis, and Thyroid disease.  Past surgical history:   has a past surgical history that includes Tonsillectomy; Appendectomy; Cholecystectomy; Hysterectomy; eye surgery; fracture surgery; and hip surgery (Left, 5/5/2024).  Social History:   reports that she has never smoked. She has never used smokeless tobacco. She reports that she does not drink alcohol and does not use drugs.  Family history:  family history is not on file.    No Known Allergies      Objective:   /79   Pulse 92   Temp 98.2 °F (36.8 °C)   Resp 24   Ht 1.499 m (4' 11\")   Wt 37.8 kg (83 lb 5.3 oz)   SpO2 95%   BMI 16.83 kg/m²     Intake/Output Summary (Last 24 hours) at 6/3/2024 1809  Last data filed at 6/3/2024 0120  Gross per 24 hour   Intake 240 ml   Output --   Net 240 ml         TELEMETRY:      Physical Exam:  Constitutional:  Well developed, Well nourished, No acute distress, Non-toxic appearance.   HENT:  Normocephalic, Atraumatic, Bilateral external ears normal, Oropharynx moist, No oral exudates, Nose normal. Neck- Normal range of motion, No tenderness, Supple, No stridor.   Eyes:  PERRL, EOMI, Conjunctiva normal, No discharge.   Respiratory:  Normal breath sounds, No respiratory distress, No wheezing, No chest tenderness.,no use of accessory muscles, diaphragm movement is normal  Cardiovascular: (PMI) apex non displaced,no lifts no thrills, no s3,no s4, Normal heart rate, Normal rhythm, No murmurs, No rubs, No gallops. Carotid arteries pulse and amplitude are normal no bruit, no abdominal bruit noted ( normal abdominal aorta ausculation), femoral arteries pulse and amplitude 
   Today's plan: restarted IV cardizem drip, keep hr less than 100bpm at 30 EVERY 6 HOURS AND TRY TO TITRATE OFF IV CARDIZEM DRIP      Admit Date:  5/31/2024    Subjective:ok      Chief complaints on admission: afib RVR          History of present illness:Breezy is a 89 y.o.year old who  presents with afib RVR    Past medical history:    has a past medical history of A-fib (HCC), Arthritis, and Thyroid disease.  Past surgical history:   has a past surgical history that includes Tonsillectomy; Appendectomy; Cholecystectomy; Hysterectomy; eye surgery; fracture surgery; and hip surgery (Left, 5/5/2024).  Social History:   reports that she has never smoked. She has never used smokeless tobacco. She reports that she does not drink alcohol and does not use drugs.  Family history:  family history is not on file.    No Known Allergies      Objective:   /80   Pulse 90   Temp 97.7 °F (36.5 °C) (Oral)   Resp 22   Ht 1.499 m (4' 11\")   SpO2 97%   BMI 19.59 kg/m²     Intake/Output Summary (Last 24 hours) at 6/2/2024 2249  Last data filed at 6/2/2024 2204  Gross per 24 hour   Intake 120 ml   Output 200 ml   Net -80 ml       TELEMETRY:      Physical Exam:  Constitutional:  Well developed, Well nourished, No acute distress, Non-toxic appearance.   HENT:  Normocephalic, Atraumatic, Bilateral external ears normal, Oropharynx moist, No oral exudates, Nose normal. Neck- Normal range of motion, No tenderness, Supple, No stridor.   Eyes:  PERRL, EOMI, Conjunctiva normal, No discharge.   Respiratory:  Normal breath sounds, No respiratory distress, No wheezing, No chest tenderness.,no use of accessory muscles, diaphragm movement is normal  Cardiovascular: (PMI) apex non displaced,no lifts no thrills, no s3,no s4, Normal heart rate, Normal rhythm, No murmurs, No rubs, No gallops. Carotid arteries pulse and amplitude are normal no bruit, no abdominal bruit noted ( normal abdominal aorta ausculation), femoral arteries pulse and 
  Facility/Department: [address]   CLINICAL BEDSIDE SWALLOW EVALUATION    NAME: Breezy Hernández  : 3/26/1931  MRN: [id number]    IMPRESSIONS AND RECOMMENDATIONS: Breezy Hernández was referred for a bedside swallow evaluation following admission to [facility] with afib with rvr. Medical hx includes recent admission with sepsis/UTI, afib, CHF, anemia, hypothyroidism. Pt seen by SLP during recent admission and recommended regular diet/thin liquids at that time.    Pt seen for evaluation seated upright in bed, alert, cooperative given cues/models. Family present throughout. Oral mechanism examination WFL without asymmetry. Pt presented with PO trials of thin liquids via cup/straw, puree, and regular solids. Oral stage grossly WFL with intact labial seal, prolonged mastication, trace/minimal residue with regular solids that cleared easily with liquid wash. Pharyngeal stage WFL without s/s aspiration.    Recommend easy to chew diet/thin liquids, general aspiration precautions. Recommend crushing pills as able. Suspect pt at baseline function. No further acute SLP needs identified.      ADMISSION DATE: 2024  ADMITTING DIAGNOSIS: has AMS (altered mental status); Physical debility; Atrial fibrillation (HCC); Atrial fibrillation with rapid ventricular response (HCC); Fracture; Displaced fracture of left femoral neck (HCC); Sepsis (HCC); Urinary tract infection without hematuria; Dementia (HCC); Chronic combined systolic (congestive) and diastolic (congestive) heart failure (HCC); Postop check; and Atrial fibrillation with RVR (HCC) on their problem list.  ONSET DATE: this admission    Recent Chest Xray/CT of Chest: see chart    Date of Eval: 2024  Evaluating Therapist: TERI Orr    Current Diet level:  Current Diet : Regular  Current Liquid Diet : Thin    Primary Complaint  poor oral intake    Pain:  Pain Assessment  Pain Assessment: Gloria-Baker FACES  Pain Level: 0  Gloria-Baker Pain Rating: No hurt  Patient's Stated 
4 Eyes Skin Assessment     NAME:  Breezy Hernández  YOB: 1931  MEDICAL RECORD NUMBER:  [id number]    The patient is being assessed for  Admission    I agree that at least one RN has performed a thorough Head to Toe Skin Assessment on the patient. ALL assessment sites listed below have been assessed.      Areas assessed by both nurses:    Head, Face, Ears, Shoulders, Back, Chest, Arms, Elbows, Hands, Sacrum. Buttock, Coccyx, Ischium, Legs. Feet and Heels, and Under Medical Devices         Does the Patient have a Wound? Yes wound(s) were present on assessment. LDA wound assessment was Initiated and completed by RN       Lamont Prevention initiated by RN: Yes  Wound Care Orders initiated by RN: Yes    Pressure Injury (Stage 3,4, Unstageable, DTI, NWPT, and Complex wounds) if present, place Wound referral order by RN under : Yes    New Ostomies, if present place, Ostomy referral order under : No     Nurse 1 eSignature: Electronically signed by Jose Smith RN on 5/31/24 at 4:54 AM EDT    **SHARE this note so that the co-signing nurse can place an eSignature**    Nurse 2 eSignature: {Esignature:[id number]}   
Admission question not done, patient confused.  
Cardiology Note    Admit Date:  5/31/2024    Admission diagnosis / Complaint :   afib    Subjective:  Ms. Hernández is resting in bed. Denies cardiac complaints      Assessment and Plan:    Continue rate control strategy with heart rate target of less than 110 given her EF of 40%. Continue Toprol-XL 50 twice daily. HR is controlled at this time   BNP slightly elevated. Continue lasix every other day  TSH 4.990  Continue to treat conservatively  HTN: stable, continue present medications   Hypercoagulable due to atrial fibrillation- continue ASA and Plavix if no contraindication. Previously not on AC due to high fall risk.   Moderate aortic regurgitation continue to monitor  DVT prophylaxis if no contraindication  6.   Dyslipidemia: continue statins     Objective:   /70   Pulse 80   Temp 98 °F (36.7 °C) (Oral)   Resp 18   Ht 1.499 m (4' 11\")   SpO2 94%   BMI 19.59 kg/m²   No intake or output data in the 24 hours ending 06/01/24 1156    TELEMETRY: Atrial fibrillation    has a past medical history of A-fib (HCC), Arthritis, and Thyroid disease.   has a past surgical history that includes Tonsillectomy; Appendectomy; Cholecystectomy; Hysterectomy; eye surgery; fracture surgery; and hip surgery (Left, 5/5/2024).    Physical Exam:  General:  Awake, alert, NAD  Skin:  Warm and dry  Neck:  JVD not appreciated  Chest:  Clear to auscultation, respiration easy  Cardiovascular:  irregular rate and rhythm, S1S2, grade 2/6 systolic murmur  Abdomen:  Soft nontender  Extremities:  no edema    Medications:    sodium chloride flush  5-40 mL IntraVENous 2 times per day    enoxaparin  30 mg SubCUTAneous Daily    aspirin  81 mg Oral Daily    clopidogrel  75 mg Oral Daily    folic acid  1 mg Oral Daily    levothyroxine  75 mcg Oral Daily    furosemide  20 mg Oral Every Other Day    melatonin  3 mg Oral Nightly    metoprolol succinate  50 mg Oral BID      sodium chloride      dilTIAZem Stopped (06/01/24 0800)     sodium 
Cardiology Note    Admit Date:  5/31/2024    Admission diagnosis / Complaint :   afib    Subjective:  Ms. Hernández is resting in bed. Denies cardiac complaints. Cardizem drip restarted yesterday due to RVR      Assessment and Plan:    Continue rate control strategy with heart rate target of less than 110 given her EF of 40%. Continue Toprol-XL 50 twice daily. HR is controlled at this time. Can start cardizem 30 q6 hours if needed.    BNP slightly elevated. Continue lasix every other day  TSH 4.990  Continue to treat conservatively  HTN: stable, continue present medications   Hypercoagulable due to atrial fibrillation- continue ASA and Plavix if no contraindication. Previously not on AC due to high fall risk.   Moderate aortic regurgitation continue to monitor  DVT prophylaxis if no contraindication  6.   Dyslipidemia: continue statins     Objective:   BP (!) 109/50   Pulse 60   Temp 97 °F (36.1 °C) (Oral)   Resp 20   Ht 1.499 m (4' 11\")   SpO2 96%   BMI 19.59 kg/m²     Intake/Output Summary (Last 24 hours) at 6/2/2024 1213  Last data filed at 6/2/2024 0000  Gross per 24 hour   Intake --   Output 200 ml   Net -200 ml       TELEMETRY: Atrial fibrillation    has a past medical history of A-fib (HCC), Arthritis, and Thyroid disease.   has a past surgical history that includes Tonsillectomy; Appendectomy; Cholecystectomy; Hysterectomy; eye surgery; fracture surgery; and hip surgery (Left, 5/5/2024).    Physical Exam:  General:  Awake, alert, NAD  Skin:  Warm and dry  Neck:  JVD not appreciated  Chest:  Clear to auscultation, respiration easy  Cardiovascular:  irregular rate and rhythm, S1S2, grade 2/6 systolic murmur  Abdomen:  Soft nontender  Extremities:  no edema    Medications:    sodium chloride flush  5-40 mL IntraVENous 2 times per day    enoxaparin  30 mg SubCUTAneous Daily    aspirin  81 mg Oral Daily    clopidogrel  75 mg Oral Daily    folic acid  1 mg Oral Daily    levothyroxine  75 mcg Oral Daily    
Cardiology Progress Note    Assessment/Plan:  Persistent atrial fibrillation with rapid ventricular response  -Heart rate improved today.  Currently in 80s to 90s  -Discontinue Cardizem drip.  Continue oral Cardizem 30 mg every 6 hours.  Titrate as needed.  Likely unable to tolerate extended release Cardizem.  Cardiomyopathy with mildly reduced ejection fraction  -Patient unable to tolerate whole pills.  Switch Toprol-XL to Lopressor 50 mg twice daily  Hypothyroidism  -On Synthroid          Francisco Tian PA-C 06/03/24 1:09 PM        
Cardiology Progress Note    Subjective/objective:  Patient accompanied by her niece this morning.  Niece states that patient lives with her and plans on taking home upon discharge.    Niece stating that patient seems to be okay but slowly declining over the last few months.  No significant complaints from patient at this time outside of fatigue.    Patient continues to have some difficulty taking pills.     Assessment/Plan:  Persistent atrial fibrillation with rapid ventricular response  -Heart rate improved today.  Currently in 80s to 90s  -Discontinued Cardizem drip.    -Discontinue Cardizem entirely at this time in order to minimize the number of pills patient has to take.  cardiomyopathy with mildly reduced ejection fraction  -Patient unable to tolerate whole pills.   -Increase Lopressor to 100 mg twice daily.  Hypothyroidism  -On Synthroid          Francisco Tian PA-C 06/04/24 11:04 AM        
Comprehensive Nutrition Assessment    Type and Reason for Visit:  Initial, Consult (nutrition Betty subscore less than/equal to 2)    Nutrition Recommendations/Plan:   Continue easy to chew diet with no added salt   Will offer standard oral nutrition supplement   Assist with meals, supervise as needed to encourage intake  Please document all PO intakes in I/O    Will continue to follow up during stay      Malnutrition Assessment:  Malnutrition Status:  Insufficient data (06/01/24 1051)    Context:  Acute Illness       Nutrition Assessment:    Admit with Afib, hx CHF, hip surgery last month, mild dementia. Currently on easy to chew diet with cardiac diet. Limited po data at this time. Patient sound asleep on attempted visits today. Concern for adequate intake and trend for weight loss if weights accurate. Agreeable to oral nutrition supplements at past admit, will resume as patient will accept. With adavanced age, reasonable to liberalize to only no added salt diet with easy to chew foods. Will follow at high nutrition risk at this time.    Nutrition Related Findings:    sound asleep x several visits Wound Type: Surgical Incision, Deep Tissue Injury (DTI-heel, hip incision)       Current Nutrition Intake & Therapies:    Average Meal Intake: Unable to assess  Average Supplements Intake: None Ordered  ADULT DIET; Easy to Chew; Low Fat/Low Chol/High Fiber/QUENTIN    Anthropometric Measures:  Height: 149.9 cm (4' 11\")  Ideal Body Weight (IBW): 95 lbs (43 kg)       Current Body Weight: 44 kg (97 lb), 102.1 % IBW. Weight Source: Stated  Current BMI (kg/m2): 19.6  Usual Body Weight: 48.1 kg (106 lb 0.7 oz) (2/12/24)  % Weight Change (Calculated): -8.5  Weight Adjustment For: No Adjustment                 BMI Categories: Underweight (BMI less than 22) age over 65    Estimated Daily Nutrient Needs:  Energy Requirements Based On: Kcal/kg  Weight Used for Energy Requirements: Current  Energy (kcal/day): 6622-1908 (30-35 
Outpatient Pharmacy Progress Note for Meds-to-Beds    Total number of Prescriptions Filled: 1    Additional Documentation:  Patient's family member picked-up the medication(s) in the OP Pharmacy      Thank you for letting us serve your patients.  [address] [address]    Phone: [phone]    Fax: [phone]       
This nurse was alerted by tele that HR was in the 150's to 160s at this time. Provider notified and cardizem gtt was restarted. Patient not in any distress at this time.     Jackie Meza RN   
      Physical Exam:   Physical Exam  Vitals reviewed.   Constitutional:       Appearance: Normal appearance. She is normal weight.   HENT:      Head: Normocephalic.      Nose: Nose normal.      Mouth/Throat:      Mouth: Mucous membranes are moist.   Eyes:      Conjunctiva/sclera: Conjunctivae normal.      Pupils: Pupils are equal, round, and reactive to light.   Cardiovascular:      Rate and Rhythm: Tachycardia present. Rhythm irregular.      Pulses: Normal pulses.      Heart sounds: Normal heart sounds. No murmur heard.  Pulmonary:      Effort: Pulmonary effort is normal.      Breath sounds: Normal breath sounds. No wheezing, rhonchi or rales.   Abdominal:      General: Abdomen is flat. Bowel sounds are normal. There is no distension.      Palpations: Abdomen is soft.      Tenderness: There is no abdominal tenderness.   Musculoskeletal:         General: No deformity. Normal range of motion.      Cervical back: Normal range of motion and neck supple.      Right lower leg: No edema.      Left lower leg: No edema.   Skin:     Coloration: Skin is not jaundiced or pale.   Neurological:      General: No focal deficit present.      Mental Status: She is alert and oriented to person, place, and time. Mental status is at baseline.      Motor: No weakness.            Medications:   Medications:    sodium chloride flush  5-40 mL IntraVENous 2 times per day    enoxaparin  30 mg SubCUTAneous Daily    aspirin  81 mg Oral Daily    clopidogrel  75 mg Oral Daily    folic acid  1 mg Oral Daily    levothyroxine  75 mcg Oral Daily    furosemide  20 mg Oral Every Other Day    melatonin  3 mg Oral Nightly    metoprolol succinate  50 mg Oral BID      Infusions:    dilTIAZem Stopped (06/02/24 0630)    sodium chloride       PRN Meds: sodium chloride flush, 5-40 mL, PRN  sodium chloride, , PRN  ondansetron, 4 mg, Q8H PRN   Or  ondansetron, 4 mg, Q6H PRN  polyethylene glycol, 17 g, Daily PRN  acetaminophen, 650 mg, Q6H PRN   
medications of all above medical condition listed as is.      Marlee Aviles MD, MD 6/1/2024 5:19 PM

## 2024-06-04 NOTE — PLAN OF CARE
Problem: Discharge Planning  Goal: Discharge to home or other facility with appropriate resources  6/4/2024 0641 by Bonifacio Aguilar RN  Outcome: Progressing  6/3/2024 1840 by Marietta Horn RN  Outcome: Progressing     Problem: Pain  Goal: Verbalizes/displays adequate comfort level or baseline comfort level  6/4/2024 0641 by Bonifacio Aguilar RN  Outcome: Progressing  6/3/2024 1840 by Marietta Horn RN  Outcome: Progressing     Problem: Skin/Tissue Integrity  Goal: Absence of new skin breakdown  Description: 1.  Monitor for areas of redness and/or skin breakdown  2.  Assess vascular access sites hourly  3.  Every 4-6 hours minimum:  Change oxygen saturation probe site  4.  Every 4-6 hours:  If on nasal continuous positive airway pressure, respiratory therapy assess nares and determine need for appliance change or resting period.  6/4/2024 0641 by Bonifacio Aguilar RN  Outcome: Progressing  6/3/2024 1840 by Marietta Horn RN  Outcome: Progressing     Problem: Safety - Adult  Goal: Free from fall injury  6/4/2024 0641 by Bonifacio Aguilar RN  Outcome: Progressing  6/3/2024 1840 by Marietta Horn RN  Outcome: Progressing     Problem: Chronic Conditions and Co-morbidities  Goal: Patient's chronic conditions and co-morbidity symptoms are monitored and maintained or improved  6/4/2024 0641 by Bonifacio Aguilar RN  Outcome: Progressing  6/3/2024 1840 by Marietta Horn RN  Outcome: Progressing     Problem: Nutrition Deficit:  Goal: Optimize nutritional status  6/4/2024 0641 by Bonifacio Aguilar RN  Outcome: Progressing  6/3/2024 1840 by Marietta Horn RN  Outcome: Progressing     
  Problem: Discharge Planning  Goal: Discharge to home or other facility with appropriate resources  Outcome: Progressing     Problem: Pain  Goal: Verbalizes/displays adequate comfort level or baseline comfort level  Outcome: Progressing     Problem: Skin/Tissue Integrity  Goal: Absence of new skin breakdown  Description: 1.  Monitor for areas of redness and/or skin breakdown  2.  Assess vascular access sites hourly  3.  Every 4-6 hours minimum:  Change oxygen saturation probe site  4.  Every 4-6 hours:  If on nasal continuous positive airway pressure, respiratory therapy assess nares and determine need for appliance change or resting period.  Outcome: Progressing     Problem: Safety - Adult  Goal: Free from fall injury  Outcome: Progressing     Problem: Chronic Conditions and Co-morbidities  Goal: Patient's chronic conditions and co-morbidity symptoms are monitored and maintained or improved  Outcome: Progressing     
  Problem: Discharge Planning  Goal: Discharge to home or other facility with appropriate resources  Outcome: Progressing     Problem: Pain  Goal: Verbalizes/displays adequate comfort level or baseline comfort level  Outcome: Progressing     Problem: Skin/Tissue Integrity  Goal: Absence of new skin breakdown  Description: 1.  Monitor for areas of redness and/or skin breakdown  2.  Assess vascular access sites hourly  3.  Every 4-6 hours minimum:  Change oxygen saturation probe site  4.  Every 4-6 hours:  If on nasal continuous positive airway pressure, respiratory therapy assess nares and determine need for appliance change or resting period.  Outcome: Progressing     Problem: Safety - Adult  Goal: Free from fall injury  Outcome: Progressing     Problem: Chronic Conditions and Co-morbidities  Goal: Patient's chronic conditions and co-morbidity symptoms are monitored and maintained or improved  Outcome: Progressing     Problem: Nutrition Deficit:  Goal: Optimize nutritional status  Outcome: Progressing     
  Problem: Discharge Planning  Goal: Discharge to home or other facility with appropriate resources  Outcome: Progressing     Problem: Pain  Goal: Verbalizes/displays adequate comfort level or baseline comfort level  Outcome: Progressing     Problem: Skin/Tissue Integrity  Goal: Absence of new skin breakdown  Description: 1.  Monitor for areas of redness and/or skin breakdown  2.  Assess vascular access sites hourly  3.  Every 4-6 hours minimum:  Change oxygen saturation probe site  4.  Every 4-6 hours:  If on nasal continuous positive airway pressure, respiratory therapy assess nares and determine need for appliance change or resting period.  Outcome: Progressing     Problem: Safety - Adult  Goal: Free from fall injury  Outcome: Progressing     Problem: Chronic Conditions and Co-morbidities  Goal: Patient's chronic conditions and co-morbidity symptoms are monitored and maintained or improved  Outcome: Progressing     Problem: Nutrition Deficit:  Goal: Optimize nutritional status  Outcome: Progressing     
adequate comfort level or baseline comfort level  6/4/2024 1145 by Marietta Horn RN  Outcome: Adequate for Discharge  6/4/2024 0641 by Bonifacio Aguilar RN  Outcome: Progressing     Problem: Skin/Tissue Integrity  Goal: Absence of new skin breakdown  Description: 1.  Monitor for areas of redness and/or skin breakdown  2.  Assess vascular access sites hourly  3.  Every 4-6 hours minimum:  Change oxygen saturation probe site  4.  Every 4-6 hours:  If on nasal continuous positive airway pressure, respiratory therapy assess nares and determine need for appliance change or resting period.  6/4/2024 1145 by Marietta Horn RN  Outcome: Adequate for Discharge  6/4/2024 0641 by Bonifacio Aguilar RN  Outcome: Progressing     Problem: Safety - Adult  Goal: Free from fall injury  6/4/2024 1145 by Marietta Horn RN  Outcome: Adequate for Discharge  6/4/2024 0641 by Bonifacio Aguilar RN  Outcome: Progressing     Problem: Chronic Conditions and Co-morbidities  Goal: Patient's chronic conditions and co-morbidity symptoms are monitored and maintained or improved  6/4/2024 1145 by Marietta Horn RN  Outcome: Adequate for Discharge  6/4/2024 0641 by Bonifacio Aguilar RN  Outcome: Progressing     Problem: Nutrition Deficit:  Goal: Optimize nutritional status  6/4/2024 1145 by Marietta Horn RN  Outcome: Adequate for Discharge  6/4/2024 0641 by Bonifacio Aguilar RN  Outcome: Progressing

## 2024-06-04 NOTE — DISCHARGE SUMMARY
V2.0  Discharge Summary    Name:  Breezy Hernández /Age/Sex: 3/26/1931 (89 y.o. female)   Admit Date: 2024  Discharge Date: 24    MRN & CSN:  [id number] & [id number] Encounter Date and Time 24 12:02 PM EDT    Attending:  Tam Mayfield MD Discharging Provider: Tam Mayfield MD       Hospital Course:     Brief HPI:Breezy Hernández is a 89 y.o. female with persistent atrial fibrillation, combined systolic and diastolic congestive heart failure, hypothyroidism, chronic anemia, cognitive impairment, right ovarian cystic neoplasm was transferred to ER from cardiology office for A-fib with RVR.  Patient is oriented to self, denied any complaints.  No chest pain or shortness of breath.  Patient did not appear to be in any acute distress.  Patient knew she was in the hospital.  Did not know the name of the hospital, year or month.  Answering simple questions and following simple commands.     Brief Problem Based Course:     #.  A-fib with RVR  - on arrival  -Initially on Cardizem drip.  Was started on p.o. diltiazem and titrated.  Patient not on anticoagulation due to fall risk.  -Continue aspirin, Plavix, metoprolol.  -However given plan for likely hospice, cardiology minimized medications and discontinued diltiazem and just titrated metoprolol to higher dose.  Patient tolerated prior to discharge.  Discussed with POA at bedside.     #.  Recent admission -2024  -Sepsis-suspected secondary to UTI A-fib with RVR  -Patient was evaluated by ID and there was no concern for surgical site infection   -patient was discharged on 10-day course of antibiotics (end date 2024)     #.  Admission - 2024 for left femoral neck fracture  -S/p IM nail abe insertion-2024  -Patient also had A-fib with RVR     #.  Persistent atrial fibrillation (onset 2023)  -Patient is on metoprolol 12.5 mg twice daily, aspirin, Plavix     #. Chronic combined systolic and diastolic CHF  -Echo-2023-EF

## 2024-06-04 NOTE — CARE COORDINATION
Discussed in IDR. Physician spoke with family about possible hospice needs post discharge at home. Currently discharging with [facility] and family support.

## 2024-06-04 NOTE — CARE COORDINATION
Spoke with pt's niece Vendal. Confirmed transport need. Transport arranged for 12:30p to take pt home. Left VM for Vendal confirming time of transport and that [facility] will follow up outpt. RN aware. Packet placed.

## 2024-06-21 PROBLEM — Z09 POSTOP CHECK: Status: RESOLVED | Noted: 2024-05-22 | Resolved: 2024-06-21

## 2024-07-03 ENCOUNTER — TELEPHONE (OUTPATIENT)
Dept: CARDIOLOGY CLINIC | Age: 89
End: 2024-07-03

## 2024-08-05 RX ORDER — FUROSEMIDE 20 MG/1
20 TABLET ORAL EVERY OTHER DAY
Qty: 45 TABLET | Refills: 0 | OUTPATIENT
Start: 2024-08-05 | End: 2024-11-03

## (undated) DEVICE — Device

## (undated) DEVICE — TUBING, SUCTION, 9/32" X 10', STRAIGHT: Brand: MEDLINE

## (undated) DEVICE — GLOVE ORANGE PI 8   MSG9080

## (undated) DEVICE — NEEDLE HYPO 20GA L1.5IN YEL POLYPR HUB S STL REG BVL STR

## (undated) DEVICE — BANDAGE,SELF ADHRNT,COFLEX,4"X5YD,STRL: Brand: COLABEL

## (undated) DEVICE — PADDING CAST W6INXL4YD COT COHESIVE HND TEARABLE SPEC 100

## (undated) DEVICE — INTENDED FOR TISSUE SEPARATION, AND OTHER PROCEDURES THAT REQUIRE A SHARP SURGICAL BLADE TO PUNCTURE OR CUT.: Brand: BARD-PARKER ® STAINLESS STEEL BLADES

## (undated) DEVICE — DRESSING HYDROFIBER AQUACEL AG ADVANTAGE 3.5X6 IN

## (undated) DEVICE — SPONGE GZ W4XL8IN COT WVN 12 PLY

## (undated) DEVICE — SUTURE ABSORBABLE BRAIDED 2-0 CT-1 27 IN UD VICRYL J259H

## (undated) DEVICE — LINER,SEMI-RIGID,3000CC,50EA/CS: Brand: MEDLINE

## (undated) DEVICE — TOWEL,OR,DSP,ST,BLUE,STD,6/PK,12PK/CS: Brand: MEDLINE

## (undated) DEVICE — DRESSING HYDROFIBER AQUACEL AG ADVANT 3.5X4 IN

## (undated) DEVICE — ELECTRODE ES AD CRDLSS PT RET REM POLYHESIVE

## (undated) DEVICE — PACK,BASIC,IX: Brand: MEDLINE

## (undated) DEVICE — MARKER SURG SKIN UTIL REGULAR/FINE 2 TIP W/ RUL AND 9 LBL

## (undated) DEVICE — GLOVE SURG SZ 8 L12IN THK75MIL DK GRN LTX FREE

## (undated) DEVICE — YANKAUER,FLEXIBLE HANDLE,REGLR CAPACITY: Brand: MEDLINE INDUSTRIES, INC.

## (undated) DEVICE — SYRINGE IRRIG 60ML SFT PLIABLE BLB EZ TO GRP 1 HND USE W/

## (undated) DEVICE — BIT SURG 4.3X152.5 MM FREEHAND FOR 3.5/2.7 LCK PLATE NAIL

## (undated) DEVICE — DRAPE SHEET ULTRAGARD: Brand: MEDLINE

## (undated) DEVICE — SOLUTION IV IRRIG WATER 1000ML POUR BRL 2F7114

## (undated) DEVICE — SPONGE LAP W18XL18IN WHT COT 4 PLY FLD STRUNG RADPQ DISP ST 2 PER PACK

## (undated) DEVICE — 6617 IOBAN II PATIENT ISOLATION DRAPE 5/BX,4BX/CS: Brand: STERI-DRAPE™ IOBAN™ 2

## (undated) DEVICE — SUTURE VICRYL SZ 2-0 L18IN ABSRB UD CT-1 L36MM 1/2 CIR J839D

## (undated) DEVICE — COVER,C-ARM,41X74: Brand: MEDLINE

## (undated) DEVICE — TAPE SURG W4INXL11YD 2IN PERF LINERLESS NONWOVEN MEDFIX EZ

## (undated) DEVICE — PAD,ABDOMINAL,5"X9",ST,LF,25/BX: Brand: MEDLINE INDUSTRIES, INC.

## (undated) DEVICE — Z INACTIVE NO ACTIVE SUPPLIER APPLICATOR MEDICATED 26 CC TINT HI-LITE ORNG STRL CHLORAPREP

## (undated) DEVICE — GOWN,SIRUS,POLYRNF,BRTHSLV,XLN/XL,20/CS: Brand: MEDLINE

## (undated) DEVICE — GOWN,ECLIPSE,POLYRNF,BRTHSLV,L,30/CS: Brand: MEDLINE

## (undated) DEVICE — PENCIL ES CRD L10FT HND SWCHING ROCK SWCH W/ EDGE COAT BLDE

## (undated) DEVICE — BANDAGE,GAUZE,BULKEE II,4.5"X4.1YD,STRL: Brand: MEDLINE

## (undated) DEVICE — SUTURE VICRYL SZ 0 L27IN ABSRB UD L36MM CT-1 1/2 CIR J260H

## (undated) DEVICE — SYRINGE MED 30ML STD CLR PLAS LUERLOCK TIP N CTRL DISP

## (undated) DEVICE — COUNTER NDL 30 COUNT FOAM STRP SGL MAG

## (undated) DEVICE — MAT ABSRB W28XL48IN C6L FLR ULT W POLY BK